# Patient Record
Sex: MALE | Race: BLACK OR AFRICAN AMERICAN | NOT HISPANIC OR LATINO | Employment: FULL TIME | URBAN - METROPOLITAN AREA
[De-identification: names, ages, dates, MRNs, and addresses within clinical notes are randomized per-mention and may not be internally consistent; named-entity substitution may affect disease eponyms.]

---

## 2024-03-17 ENCOUNTER — HOSPITAL ENCOUNTER (EMERGENCY)
Facility: CLINIC | Age: 39
Discharge: OTHER INSTITUTION | End: 2024-03-17
Attending: EMERGENCY MEDICINE | Admitting: EMERGENCY MEDICINE

## 2024-03-17 ENCOUNTER — HOSPITAL ENCOUNTER (EMERGENCY)
Facility: CLINIC | Age: 39
Discharge: LEFT BEFORE DISCHARGE | End: 2024-03-17
Admitting: EMERGENCY MEDICINE

## 2024-03-17 VITALS
HEART RATE: 62 BPM | WEIGHT: 230 LBS | DIASTOLIC BLOOD PRESSURE: 61 MMHG | RESPIRATION RATE: 20 BRPM | OXYGEN SATURATION: 95 % | SYSTOLIC BLOOD PRESSURE: 131 MMHG | BODY MASS INDEX: 34.86 KG/M2 | TEMPERATURE: 97.6 F | HEIGHT: 68 IN

## 2024-03-17 VITALS
SYSTOLIC BLOOD PRESSURE: 125 MMHG | OXYGEN SATURATION: 100 % | TEMPERATURE: 98.3 F | HEIGHT: 68 IN | DIASTOLIC BLOOD PRESSURE: 78 MMHG | HEART RATE: 55 BPM | RESPIRATION RATE: 16 BRPM | WEIGHT: 230 LBS | BODY MASS INDEX: 34.86 KG/M2

## 2024-03-17 DIAGNOSIS — H57.11 EYE PAIN, RIGHT: ICD-10-CM

## 2024-03-17 PROCEDURE — 99283 EMERGENCY DEPT VISIT LOW MDM: CPT | Mod: 25

## 2024-03-17 PROCEDURE — 99281 EMR DPT VST MAYX REQ PHY/QHP: CPT | Performed by: EMERGENCY MEDICINE

## 2024-03-17 PROCEDURE — 250N000013 HC RX MED GY IP 250 OP 250 PS 637: Performed by: PHYSICIAN ASSISTANT

## 2024-03-17 PROCEDURE — 96372 THER/PROPH/DIAG INJ SC/IM: CPT | Performed by: PHYSICIAN ASSISTANT

## 2024-03-17 PROCEDURE — 250N000009 HC RX 250: Performed by: PHYSICIAN ASSISTANT

## 2024-03-17 PROCEDURE — 250N000011 HC RX IP 250 OP 636: Performed by: PHYSICIAN ASSISTANT

## 2024-03-17 RX ORDER — TETRACAINE HYDROCHLORIDE 5 MG/ML
1-2 SOLUTION OPHTHALMIC ONCE
Status: COMPLETED | OUTPATIENT
Start: 2024-03-17 | End: 2024-03-17

## 2024-03-17 RX ORDER — OXYCODONE HYDROCHLORIDE 5 MG/1
5 TABLET ORAL ONCE
Status: COMPLETED | OUTPATIENT
Start: 2024-03-17 | End: 2024-03-17

## 2024-03-17 RX ORDER — KETOROLAC TROMETHAMINE 15 MG/ML
15 INJECTION, SOLUTION INTRAMUSCULAR; INTRAVENOUS ONCE
Status: COMPLETED | OUTPATIENT
Start: 2024-03-17 | End: 2024-03-17

## 2024-03-17 RX ADMIN — TETRACAINE HYDROCHLORIDE 2 DROP: 5 SOLUTION OPHTHALMIC at 10:20

## 2024-03-17 RX ADMIN — OXYCODONE 5 MG: 5 TABLET ORAL at 10:43

## 2024-03-17 RX ADMIN — KETOROLAC TROMETHAMINE 15 MG: 15 INJECTION, SOLUTION INTRAMUSCULAR; INTRAVENOUS at 10:44

## 2024-03-17 RX ADMIN — FLUORESCEIN SODIUM 1 STRIP: 1 STRIP OPHTHALMIC at 10:20

## 2024-03-17 ASSESSMENT — ACTIVITIES OF DAILY LIVING (ADL)
ADLS_ACUITY_SCORE: 35
ADLS_ACUITY_SCORE: 33
ADLS_ACUITY_SCORE: 35
ADLS_ACUITY_SCORE: 33

## 2024-03-17 ASSESSMENT — COLUMBIA-SUICIDE SEVERITY RATING SCALE - C-SSRS
1. IN THE PAST MONTH, HAVE YOU WISHED YOU WERE DEAD OR WISHED YOU COULD GO TO SLEEP AND NOT WAKE UP?: NO
6. HAVE YOU EVER DONE ANYTHING, STARTED TO DO ANYTHING, OR PREPARED TO DO ANYTHING TO END YOUR LIFE?: NO
2. HAVE YOU ACTUALLY HAD ANY THOUGHTS OF KILLING YOURSELF IN THE PAST MONTH?: NO

## 2024-03-17 NOTE — ED TRIAGE NOTES
Pt here with concern that he may have re injured his right eye during the night. Pt had a cornea scratch a year ago and was treated with a medicated contact lens for a while and was told he could re injure it. Pt had no obvious trauma but think he may have accidentally scratched it. Eye watery, lid swollen. Pain 10/10.

## 2024-03-17 NOTE — ED NOTES
Patient unwilling to wait to see provider.  Patient stated they have been in the hospital since 8am and would like to go home.  Patient states they will come back in the morning or if the pain increases.  Patient was unwilling to wait to speak with MD.

## 2024-03-17 NOTE — ED TRIAGE NOTES
Eye sensitivity on the R eye started yesterday. Was seen at [facility] and suggested to come to the ED for ophtho eval     Triage Assessment (Adult)       Row Name 03/17/24 1231          Triage Assessment    Airway WDL WDL        Respiratory WDL    Respiratory WDL WDL        Skin Circulation/Temperature WDL    Skin Circulation/Temperature WDL WDL        Cardiac WDL    Cardiac WDL WDL        Peripheral/Neurovascular WDL    Peripheral Neurovascular WDL WDL        Cognitive/Neuro/Behavioral WDL    Cognitive/Neuro/Behavioral WDL WDL

## 2024-03-17 NOTE — ED PROVIDER NOTES
I am seeing this patient along with  Lio Dash PA-C. I had a face to face encounter with this patient seen by the Advanced Practice Provider (DARLIN).  I have seen, examined, and discussed the patient with the DARLIN and agree with their assessment and plan of management. I personally saw the patient and performed a substantive portion of the visit including all aspects of the medical decision making.    HPI: The patient reports for right eye pain. Patient states he is concern about possible injury to his right eye. He had a cornea scratch a year ago and was treated with a medicated contact lens for awhile. Patient was told he could re-injure it. No obvious trauma but thinks he may have accidentally scratched it. Patients eye is watery and endorses swollen lid. He rates the pain a 10/10. No other complaints at this time.     Physical Exam: Right eye with conjunctival injection mildly and tearing.  Significant photophobia present.      LABS  Pertinent lab results reviewed in chart.  Labs Ordered and Resulted from Time of ED Arrival to Time of ED Departure - No data to display    EKG      RADIOLOGY  No orders to display       PROCEDURES       ED COURSE & MEDICAL DECISION MAKING      10:23 AM I met and introduce myself to the patient.   Pertinent Labs and Imagaing reviewed (see chart for details)    38 year old male here with atraumatic right eye pain, photophobia.  He has a history of corneal abrasion in the past as well as a corneal ulcer is what it sounds like.  There was no trauma this time, and his staining exam in the ED did not reveal an abrasion or any uptake.  However, he continues with pretty significant photophobia and I am worried about something like iritis.  He also has eye pressures that are in the 35-41 range bilaterally.  We do think he needs an ophthalmologic evaluation.  Unfortunately, we have no specialty services to consult here at [facility], and we cannot get any phone consultations with the  team from the , so the only option on the weekend currently is to do an ED to ED transfer to [facility] for him to be seen in person by ophthalmology there.  The ED physician at [facility] has accepted the patient for transfer and patient and family member are comfortable with this plan.    At the conclusion of the encounter I discussed  the results of all of the tests and the disposition.   The questions were answered.  The patient or family acknowledged understanding and was agreeable with the care plan.       FINAL IMPRESSION      1. Eye pain, right            CRITICAL CARE  0 Minutes    Adri Chery MD  03/17/24 0957  3/17/2024 10:07 AM      Adri Chery MD  03/17/24 1053

## 2024-03-17 NOTE — ED PROVIDER NOTES
EMERGENCY DEPARTMENT ENCOUNTER   NAME: Power Blanco ; AGE: 38 year old male ; YOB: 1985 ; MRN: [id number] ; PCP: No primary care provider on file.     Evaluation Date & Time: 3/17/2024  9:32 AM    ED Provider: Ekta Vaca PA-C    CHIEF COMPLAINT     Eye Pain      FINAL ASSESSMENT       ICD-10-CM    1. Eye pain, right  H57.11           ED COURSE, MEDICAL DECISION MAKING, PLAN     ED course   9:46 AM: Evaluated patient. Performed physical exam. Plan to use tetracaine and fluorescein and do a more detailed eye exam.   10:11 AM: Performed slit lamp exam and ocular pressures. Staffed with Dr. Chery.   10:46 AM: Spoke with Dr. Alexis from [facility]. Pt will ED to ED transfer now.   10:52 AM: Updated patient. Him and wife agreeable with plan. They will drive there now.     ______________________________________________________________________    Power Blanco is a 38 year old male with pertinent medical history of DM2 presenting for right eye pain, watering, and photophobia since yesterday. No known injury. Pt had a corneal abrasion one year ago to this eye that led to a corneal erosion and was treated with a medicated lens.     On exam patient is uncomfortable appearing. Very difficulty with opening the right eye. Eye is watering. Upper lid appears scantly swollen without erythema. Conjunctiva is slightly reddened, but not significantly so. No fluorescein uptake appreciated. No dendritic lesions. No ulcerations. Anterior chamber free of debris. No gabriel sign. PERRLA. Visual acuity unable to be done due to patients pain and eye watering.   Vitally normal.     Considered corneal abrasion, corneal ulcer, iritis, scleritis, acute angle closure glaucoma, eye foreign body, ocular shingles.    Tetracaine and fluorescein placed in right eye. Ocular pressures obtained which were elevated bilaterally.  Right eye with an average of 35 and left eye with an average of 41.  Slit lamp was then performed  showing no significant findings as discussed below. Orellana lamp also used with no areas of uptake appreciated.     Despite placing multiple drops of tetracaine, patient continues to have pretty severe pain in that right eye.  He continues having a harder time opening the eye and it continues to water profusely.  He also continues with severe photophobia.    Considering patient's exam, I did feel it was necessary for patient to be urgently evaluated by ophthalmology.  I did try calling his ophthalmologist through [facility], but they were not available on the weekend.  I also tried reaching out to [facility], but they were unable to accommodate given the weekend.  Because of this, called over to [place] ED to have him transferred there for urgent ophthalmology consult.    Patient continues to report only minimal relief from the tetracaine despite multiple drops. Patient was then given 5 mg of oral oxycodone and 15 mg of IM Toradol just prior to leave ED.     Wife will drive him to [place] ED now.      ______________________________________________________________________    *All pertinent lab & imaging studies independently reviewed. (See chart for details)   *Discussed the results of all the tests and plan with patient and family/guardians.   *All questions were answered.   *The patient and/or family/guardian acknowledged understanding and was agreeable with the care plan.      HISTORY OF PRESENT ILLNESS   Patient information was obtained from: Patient    Use of Intrepreter: N/A     Power NIETO Francis is a 38 year old male with a pertinent history of DM 2 who presents to the ED by means of walk-in for evaluation of right eye pain that started yesterday.    Pt had a corneal abrasion 1 year ago that caused him some issues for couple of months.  He ended up getting medicated lens placed by ophthalmology that seem to help.  Last time he was seen by ophthalmology was in November 2022.  He states he has not had  "any issues since then.    He cannot recall any type of new injury to the eye but thinks maybe something could have happened in his sleep.    States he has severe pain and severe light sensitivity.  States the eye is watering profusely.  States he is uncertain about vision changes because he cannot even open the eye and when he does it water so much that he cannot see anything.    He has not had any systemic signs or symptoms.    Per my chart review:   Patient was seen at [facility] on 5/29/2022 for a corneal abrasion when he was playing basketball and another player poked him in the right eye.  He was prescribed antibiotic eyedrops and Voltaren eyedrops.  Patient was then seen on 9/1/2022 [place] [facility] for recurrent right eye pain despite no new injury.  He was given erythromycin ointment.  He was given referral to ophthalmology.  Patient was then seen by ophthalmology on 11/2/2022 and diagnosed with a corneal erosion.  BCL was placed on the eye.  IOP's were elevated.  He was then seen the next day for follow-up and was to start Ocuflox, lubricating drops, and a lubricant ointment.       MEDICAL HISTORY     No past medical history on file.    No past surgical history on file.    No family history on file.         No current outpatient medications on file.        PHYSICAL EXAM     First Vitals:  Patient Vitals for the past 24 hrs:   BP Temp Pulse Resp SpO2 Height Weight   03/17/24 0930 -- -- -- -- -- 1.727 m (5' 8\") 104.3 kg (230 lb)   03/17/24 0929 131/61 97.6  F (36.4  C) 62 20 95 % -- --         PHYSICAL EXAM:   Eyes:  Gross exam -right upper eyelid is scantly swollen without erythema.  There is profuse watering to the right eye.  He is keeping the eye closed and is having a difficult time opening it.  Unable to get accurate visual acuity due to his inability to keep the eye open and the profuse watering.  Unable to do confrontation testing either because of the same.  The " right-sided conjunctiva is slightly reddened, but not significantly so.  No visualized foreign body.  No grossly visualized corneal defect.  PERRL.  No crusting or mattering of the lids or lashes.   Slit lamp exam - No foreign body.  No fluorescein uptake.  No corneal defect. Anterior chamber appears clear. No dendritic lesion.  No Bob sign.    Ocular pressures elevated with right eye at 35 and left eye at 41.     Constitutional: Patient is uncomfortable appearing.  Covers the right eye and holds the right side of his head.  Neuro: Awake and alert. No focal deficits.  Psych: Calm and cooperative.  Cardio: Regular rate. Adequate perfusion to extremities.   Pulmonary: Oxygenating well on RA. No labored breathing.   Skin: Natural color. Warm, dry, intact.      RESULTS     LAB:  All pertinent labs reviewed and interpreted  Labs Ordered and Resulted from Time of ED Arrival to Time of ED Departure - No data to display    RADIOLOGY:  No orders to display       ECG:    N/A      PROCEDURES       PROCEDURE: Slit lamp Exam and woods lamp exam   INDICATIONS: Right eye pain, watering   PROCEDURE PROVIDER: Ekta Vaca PA-C   SITE: right eye   CONSENT: The risks, benefits and alternatives for this procedure were explained to the patient and verbally accepted.     MEDICATION: fluorescein stain and tetracaine   EXAM FINDINGS: Right Eye: No fluorescein uptake.  No corneal defects.  No debris seen in the anterior chamber.  No foreign body.  No Bob sign.  No dendritic lesions.  Left Eye: N/A   COMPLICATIONS: Patient tolerated procedure well, without complication       PROCEDURE: Intraocular Pressure Measurement   DEVICE USED: Tonopen   INDICATIONS: Right eye pain   PROCEDURE PROVIDER: Ekta Vaca PA-C   SITE: Eyes   MEDICATION: 0.5% Tetracaine ophthalmic drops were applied to both eyes   NOTE: Administered 2 drops of above solution, rapid anesthesia achieved. Using standard technique, performed Tonopen exam, which showed  intraocular pressure(s) of;  35 mmHg in Right eye  41 mmHg in Left eye     COMPLICATIONS: Patient tolerated procedure well, without complication               History:  Supplemental history from: Documented in chart  External Record(s) reviewed: Outpatient Record: See HPI    Work Up:  Chart documentation includes differentials considered and any EKGs or imaging independently interpreted by provider, where specified.  In additional to work up documented, I considered the following work up: Documented in chart, if applicable.    External consultation:  Discussion of management with another provider: Other: ED MD from [place] [facility]    Complicating factors:  Care impacted by chronic illness: Diabetes  Care affected by social determinants of health: N/A    Disposition considerations:  ED to ED transfer to [place] for ophthalmology consult    FINAL IMPRESSION:    ICD-10-CM    1. Eye pain, right  H57.11             MEDICATIONS GIVEN IN THE EMERGENCY DEPARTMENT:  Medications   tetracaine (PONTOCAINE) 0.5 % ophthalmic solution 1-2 drop (2 drops Right Eye $Given by Other 3/17/24 1020)   fluorescein (FUL-SAIDA) ophthalmic strip 1 strip (1 strip Right Eye $Given by Other 3/17/24 1020)   oxyCODONE (ROXICODONE) tablet 5 mg (5 mg Oral $Given 3/17/24 1043)   ketorolac (TORADOL) injection 15 mg (15 mg Intramuscular $Given 3/17/24 1044)         NEW PRESCRIPTIONS STARTED AT TODAY'S ED VISIT:  New Prescriptions    No medications on file              Some or all of this documentation has been completed using dictation software and mild grammatical errors may be present. Please contact me with any concerns regarding this.       Ekta Vaca PA-C  Emergency Medicine   [facility] EMERGENCY ROOM       Ekta Vaca PA-C  03/17/24 0499

## 2024-03-20 ENCOUNTER — HOSPITAL ENCOUNTER (EMERGENCY)
Facility: CLINIC | Age: 39
Discharge: ANOTHER HOSPITAL | End: 2024-03-20
Attending: EMERGENCY MEDICINE | Admitting: EMERGENCY MEDICINE

## 2024-03-20 ENCOUNTER — OFFICE VISIT (OUTPATIENT)
Dept: OPHTHALMOLOGY | Facility: CLINIC | Age: 39
End: 2024-03-20
Attending: STUDENT IN AN ORGANIZED HEALTH CARE EDUCATION/TRAINING PROGRAM

## 2024-03-20 ENCOUNTER — HOSPITAL ENCOUNTER (EMERGENCY)
Facility: CLINIC | Age: 39
Discharge: HOME | End: 2024-03-20
Attending: FAMILY MEDICINE

## 2024-03-20 VITALS
SYSTOLIC BLOOD PRESSURE: 130 MMHG | OXYGEN SATURATION: 100 % | HEART RATE: 68 BPM | RESPIRATION RATE: 16 BRPM | DIASTOLIC BLOOD PRESSURE: 69 MMHG | TEMPERATURE: 97.8 F

## 2024-03-20 VITALS
OXYGEN SATURATION: 100 % | RESPIRATION RATE: 20 BRPM | TEMPERATURE: 98 F | HEIGHT: 68 IN | HEART RATE: 66 BPM | DIASTOLIC BLOOD PRESSURE: 67 MMHG | SYSTOLIC BLOOD PRESSURE: 141 MMHG | BODY MASS INDEX: 34.86 KG/M2 | WEIGHT: 230 LBS

## 2024-03-20 DIAGNOSIS — H57.11 ACUTE RIGHT EYE PAIN: Primary | ICD-10-CM

## 2024-03-20 DIAGNOSIS — H53.8 BLURRY VISION, RIGHT EYE: ICD-10-CM

## 2024-03-20 DIAGNOSIS — H53.149 PHOTOPHOBIA: ICD-10-CM

## 2024-03-20 DIAGNOSIS — H40.053 INCREASED INTRAOCULAR PRESSURE, BILATERAL: ICD-10-CM

## 2024-03-20 DIAGNOSIS — H40.039 ANATOMICAL NARROW ANGLE: ICD-10-CM

## 2024-03-20 DIAGNOSIS — H40.211 ACUTE ANGLE-CLOSURE GLAUCOMA OF RIGHT EYE: ICD-10-CM

## 2024-03-20 DIAGNOSIS — H40.211 ACUTE ANGLE-CLOSURE GLAUCOMA OF RIGHT EYE: Primary | ICD-10-CM

## 2024-03-20 PROCEDURE — 96372 THER/PROPH/DIAG INJ SC/IM: CPT

## 2024-03-20 PROCEDURE — 99284 EMERGENCY DEPT VISIT MOD MDM: CPT | Performed by: FAMILY MEDICINE

## 2024-03-20 PROCEDURE — 99283 EMERGENCY DEPT VISIT LOW MDM: CPT | Performed by: FAMILY MEDICINE

## 2024-03-20 PROCEDURE — 250N000013 HC RX MED GY IP 250 OP 250 PS 637

## 2024-03-20 PROCEDURE — 250N000009 HC RX 250

## 2024-03-20 PROCEDURE — 250N000011 HC RX IP 250 OP 636

## 2024-03-20 PROCEDURE — 66761 REVISION OF IRIS: CPT | Mod: 50 | Performed by: OPHTHALMOLOGY

## 2024-03-20 PROCEDURE — 99204 OFFICE O/P NEW MOD 45 MIN: CPT | Mod: 25 | Performed by: OPHTHALMOLOGY

## 2024-03-20 PROCEDURE — 66761 REVISION OF IRIS: CPT | Mod: LT

## 2024-03-20 PROCEDURE — 99285 EMERGENCY DEPT VISIT HI MDM: CPT

## 2024-03-20 PROCEDURE — 66761 REVISION OF IRIS: CPT | Mod: RT

## 2024-03-20 RX ORDER — TETRACAINE HYDROCHLORIDE 5 MG/ML
1 SOLUTION OPHTHALMIC ONCE
Status: COMPLETED | OUTPATIENT
Start: 2024-03-20 | End: 2024-03-20

## 2024-03-20 RX ORDER — KETOROLAC TROMETHAMINE 15 MG/ML
15 INJECTION, SOLUTION INTRAMUSCULAR; INTRAVENOUS ONCE
Status: COMPLETED | OUTPATIENT
Start: 2024-03-20 | End: 2024-03-20

## 2024-03-20 RX ORDER — LATANOPROST 50 UG/ML
1 SOLUTION/ DROPS OPHTHALMIC DAILY
Qty: 7.5 ML | Refills: 0 | Status: SHIPPED | OUTPATIENT
Start: 2024-03-20

## 2024-03-20 RX ORDER — ACETAZOLAMIDE 250 MG/1
500 TABLET ORAL ONCE
Status: DISCONTINUED | OUTPATIENT
Start: 2024-03-20 | End: 2024-03-20

## 2024-03-20 RX ORDER — OXYCODONE HYDROCHLORIDE 5 MG/1
5 TABLET ORAL ONCE
Status: COMPLETED | OUTPATIENT
Start: 2024-03-20 | End: 2024-03-20

## 2024-03-20 RX ORDER — BRIMONIDINE TARTRATE 2 MG/ML
1 SOLUTION/ DROPS OPHTHALMIC 3 TIMES DAILY
Qty: 5 ML | Refills: 1 | Status: SHIPPED | OUTPATIENT
Start: 2024-03-20 | End: 2024-04-09

## 2024-03-20 RX ORDER — ACETAZOLAMIDE 250 MG/1
500 TABLET ORAL ONCE
Status: COMPLETED | OUTPATIENT
Start: 2024-03-20 | End: 2024-03-20

## 2024-03-20 RX ORDER — DORZOLAMIDE HCL 20 MG/ML
1 SOLUTION/ DROPS OPHTHALMIC 3 TIMES DAILY
Qty: 10 ML | Refills: 1 | Status: SHIPPED | OUTPATIENT
Start: 2024-03-20 | End: 2024-03-20

## 2024-03-20 RX ORDER — ACETAZOLAMIDE 500 MG/1
500 CAPSULE, EXTENDED RELEASE ORAL 2 TIMES DAILY
Qty: 20 CAPSULE | Refills: 1 | Status: SHIPPED | OUTPATIENT
Start: 2024-03-20 | End: 2024-04-01

## 2024-03-20 RX ADMIN — FLUORESCEIN SODIUM 1 STRIP: 1 STRIP OPHTHALMIC at 10:12

## 2024-03-20 RX ADMIN — ACETAZOLAMIDE 500 MG: 250 TABLET ORAL at 14:03

## 2024-03-20 RX ADMIN — KETOROLAC TROMETHAMINE 15 MG: 15 INJECTION, SOLUTION INTRAMUSCULAR; INTRAVENOUS at 10:09

## 2024-03-20 RX ADMIN — OXYCODONE 5 MG: 5 TABLET ORAL at 10:11

## 2024-03-20 RX ADMIN — TETRACAINE HYDROCHLORIDE 1 DROP: 5 SOLUTION OPHTHALMIC at 10:12

## 2024-03-20 ASSESSMENT — TONOMETRY
IOP_METHOD: TONOPEN
OD_IOP_MMHG: 52
OS_IOP_MMHG: 28
IOP_METHOD: TONOPEN
IOP_METHOD: TONOPEN
OD_IOP_MMHG: 45
OD_IOP_MMHG: 47

## 2024-03-20 ASSESSMENT — SLIT LAMP EXAM - LIDS
COMMENTS: PROTECTIVE PTOSIS
COMMENTS: NORMAL

## 2024-03-20 ASSESSMENT — ACTIVITIES OF DAILY LIVING (ADL)
ADLS_ACUITY_SCORE: 33
ADLS_ACUITY_SCORE: 33
ADLS_ACUITY_SCORE: 35

## 2024-03-20 ASSESSMENT — COLUMBIA-SUICIDE SEVERITY RATING SCALE - C-SSRS
2. HAVE YOU ACTUALLY HAD ANY THOUGHTS OF KILLING YOURSELF IN THE PAST MONTH?: NO
1. IN THE PAST MONTH, HAVE YOU WISHED YOU WERE DEAD OR WISHED YOU COULD GO TO SLEEP AND NOT WAKE UP?: NO
6. HAVE YOU EVER DONE ANYTHING, STARTED TO DO ANYTHING, OR PREPARED TO DO ANYTHING TO END YOUR LIFE?: NO
2. HAVE YOU ACTUALLY HAD ANY THOUGHTS OF KILLING YOURSELF IN THE PAST MONTH?: NO
6. HAVE YOU EVER DONE ANYTHING, STARTED TO DO ANYTHING, OR PREPARED TO DO ANYTHING TO END YOUR LIFE?: NO
1. IN THE PAST MONTH, HAVE YOU WISHED YOU WERE DEAD OR WISHED YOU COULD GO TO SLEEP AND NOT WAKE UP?: NO

## 2024-03-20 ASSESSMENT — ENCOUNTER SYMPTOMS: EYE PAIN: 1

## 2024-03-20 ASSESSMENT — EXTERNAL EXAM - RIGHT EYE: OD_EXAM: NORMAL

## 2024-03-20 ASSESSMENT — EXTERNAL EXAM - LEFT EYE: OS_EXAM: NORMAL

## 2024-03-20 NOTE — ED TRIAGE NOTES
Took sidenafil 100mg and dapoxeoline 60 mg oral jelly on Friday and shortly afterwards bilateral eyes became painful and swollen. Photosensitive. Did take a second dose on Saturday and is wondering if this is some kind of reaction to medication. Denies injury but does state this feels similar to when he had a corneal abrasion.      Triage Assessment (Adult)       Row Name 03/20/24 0849          Triage Assessment    Airway WDL WDL        Skin Circulation/Temperature WDL    Skin Circulation/Temperature WDL WDL

## 2024-03-20 NOTE — PROGRESS NOTES
Power Blanco is a 38 year old male who presented to the ED 3/20/24 for right eye vision loss and pain.      Friday 3/15/24 he took sildenafil then shortly thereafter each eye developed blurry vision. He woke up with right eye pain that lasted the whole day but slowly improved. He then took another dose of sildenafil Saturday night and both eyes had blurry vision again. When he woke up only the right eye was blurry and painful. He presented to receive medical attention Sunday but the wait was too long at the ED so he left. The right eye vision loss (50% of his normal vision) and aching eye pain have persisted prompting presentation today. The entire visual field is blurry. Pain is dull/aching and lights or pressure on the eye make the pain worse.      Has had a right eye corneal abrasion in the past while playing basketball that hurt more than this.      Tried visine with no improvement.      No trauma or foreign bodies. Pain started seemingly out of nowhere.      No personal or family history of sickle cell or glaucoma.      Review of systems were otherwise negative except for that which has been stated above.        OCULAR/MEDICAL/SURGICAL HISTORIES:      Past Ocular History:  Last eye exam: 11/22  Prior eye surgery/laser: None  Contact lens wear: No  Glasses: None  Eyedrops: Visine prn      Family History:  No family history of sickle cell or glaucoma         Past Medical History        Past Medical History:   Diagnosis Date    Diabetes mellitus, type 2 (H)              Past Surgical History   No past surgical history on file.        EXAMINATION:      Base Eye Exam         Visual Acuity (Snellen - Linear)           Right Left     Near sc 20/400 PH NI 20/20                  Tonometry (Tonopen, 12:40 PM)           Right Left     Pressure Too high to measure 42                  Pupils           Dark Light Shape React APD     Right 5 5 Round Minimal None by reverse     Left 3 1 Round Brisk None                   Visual Fields           Left Right         Full     Restrictions Partial outer superior temporal, inferior temporal, superior nasal, inferior nasal deficiencies                    Extraocular Movement           Right Left       Full, Ortho Full, Ortho                  Dilation    Deferred given narrow angles left eye with history suggestive of  inducible angle closure                      Slit Lamp and Fundus Exam         External Exam           Right Left     External Normal Normal                  Slit Lamp Exam           Right Left     Lids/Lashes protective ptosis Normal     Conjunctiva/Sclera limbal flush, CAM Large patch of CAM IT     Cornea Microcystic edema greater centrally, inferior pigmented cell clear, compact, no KP     Anterior Chamber Shallow, 2+ mixed pigment and cell Formed, very shallow by VH, no cell     Iris Fixed, mid dilated, no posterior synechiae visualized Round and reactive     Lens No lens iris touch Clear     Anterior Vitreous Poor view                            Labs/Studies/Imaging Performed:  Bedside ultrasound. No choroidal effusions identified. Vitreous clear of debris, no retinal tears or detachments. No T sign.       ASSESSMENT/PLAN:      Power Blanco is a 38 year old male who presented 03/20/24 with      # Acute Angle Closure, Right Eye:   # Anatomic Narrow Angle, Left Eye:  38 year old male of unknown refractive state who presented with right eye pain and vision loss found to be in acute angle closure right eye with narrow angles left eye. There have been reports of sildenafil associated angle closure though the mechanism has not been confirmed. His angle closure may have been precipitated by pupillary dilation via sympathetic drive and dark lighting surrounding the use of sildenafil. Sulfa induced choroidal effusion as a posterior pushing mechanism was considered but ruled out with bed side ultra sound which was confirmed prior to administration of diamox. Also considered  uveitic glaucoma given 2+ mixed cell right eye however this was likely a response to 4 days of angle closure and his left eye, which was symptomatic over the weekend, did not have cell. No evidence of an anterior pulling mechanism on exam, though all angle structures were obscured by iris right eye. His pressure responded to topical IOP lowering medications and one dose of 500 mg diamox, from unreadably high to the 40s and 50s right eye and from 42 to the 20s left eye.      He was discharged from the ED for immediate LPI each eye in clinic. See procedure note from that visit. We discussed the r/b/a to LPI each eye and the patient agreed to proceed. We also discussed that the degree of permanent vision loss secondary to IOP induced optic nerve damage was unknown at this time and would need to be assessed after successful LPI.         Plan:  - Diamox sequels 500 BID (no personal or family history of sickle cell and no renal disease)  - Latanoprost, timolol, and brimonidine each eye TID   - For post LPI hyphema, bedrest as much as possible, sleep with head of bed elevated, no heavy lifting or straining.   - Follow up Friday 3/22/24 for IOP check, hyphema check and likely repeat LPI each eye if patency cannot be confirmed after hyphema resolution.         Patient was discussed with Dr. Gilliland, PGY-4 and seen with Dr. Kebede.     Sridhar Childers MD  Resident Physician, PGY-2  Department of Ophthalmology     The patient transferred to clinic to receive LPI each eye from the ED for acute angle closure right eye and narrow angles left eye. Please see consult note from today for details. R/B/A to LPI each eye were discussed with the patient who agreed to proceed with the procedure in both eyes.       Brief Operative Note:    Date of Service: March 20, 2024    Attending: Francisco Kebede M.D.   Assistant: Sridhar Childers M.D.    Preoperative diagnosis: Narrow angles, both eyes  Postoperative diagnosis:  same  Procedure: laser peripheral iridotomy (LPI) both eyes  Anesthesia: Topical lidocaine  Complications: Hyphema each eye     After informed consent was obtained the patient was brought to the YAG laser room and given one drop of proparacaine in the operative eyes. One drop of Iopidine was given. Attention was directed to the right eye. An Riley iridotomy lens was used to view the peripheral iris at 11 o' clock. 3.2 - 3.6 mJ 42 shots, 1 pulse per burst were applied to an iris crypt until hemorrhage was encountered. Pressure was applied until the hemorrhage ceased. Attention was directed to the left eye. An Riley iridotomy lens was used to view the peripheral iris at 1 o' clock. 3.8 mJ for 49 shots, 1 pulse per burst were applied to an iris crypt until hemorrhage was encountered. Pressure was applied until the hemorrhage ceased. Both eyes were inspected and it was deemed that no further laser could be applied safely through the hemorrhages and the procedure was stopped. The patient tolerated the procedure well. One drop of Iopidine was given. The Riley lens was removed.    The patient was informed that hemorrhage precluded confirmation of successful LPI in both eyes and that re-evaluation and possible repeat procedure would be required at follow up once hemorrhage cleared.    IOP check after the procedure was 47 and 23 down from unreadable and 42 on presentation.     Sridhar Childers MD  Resident Physician, PGY-2  Department of Ophthalmology    Attending Physician Attestation:  Complete documentation of historical and exam elements from today's encounter can be found in the full encounter summary report (not reduplicated in this progress note).  I personally obtained the chief complaint(s) and history of present illness.  I confirmed and edited as necessary the review of systems, past medical/surgical history, family history, social history, and examination findings as documented by others; and I examined  the patient myself.  I personally reviewed the relevant tests, images, and reports as documented above.  I formulated and edited as necessary the assessment and plan and discussed the findings and management plan with the patient and family.  - Francisco Kebede MD

## 2024-03-20 NOTE — ED TRIAGE NOTES
Triage Assessment (Adult)       Row Name 03/20/24 1133          Triage Assessment    Airway WDL WDL        Respiratory WDL    Respiratory WDL WDL        Skin Circulation/Temperature WDL    Skin Circulation/Temperature WDL WDL        Cardiac WDL    Cardiac WDL WDL        Peripheral/Neurovascular WDL    Peripheral Neurovascular WDL WDL        Cognitive/Neuro/Behavioral WDL    Cognitive/Neuro/Behavioral WDL WDL

## 2024-03-20 NOTE — ED NOTES
Bed: ED19  Expected date:   Expected time:   Means of arrival:   Comments:  Hold VIV JON once clean

## 2024-03-20 NOTE — CONSULTS
OPHTHALMOLOGY CONSULT NOTE  03/20/24    Patient: Power Blanco  Consulted by: ED  Reason for Consult: right eye vision loss and pain    HISTORY OF PRESENTING ILLNESS:     Power Blanco is a 38 year old male who presented to the ED 3/20/24 for right eye vision loss and pain.     Friday 3/15/24 he took sildenafil then shortly thereafter each eye developed blurry vision. He woke up with right eye pain that lasted the whole day but slowly improved. He then took another dose of sildenafil Saturday night and both eyes had blurry vision again. When he woke up only the right eye was blurry and painful. He presented to receive medical attention Sunday but the wait was too long at the ED so he left. The right eye vision loss (50% of his normal vision) and aching eye pain have persisted prompting presentation today. The entire visual field is blurry. Pain is dull/aching and lights or pressure on the eye make the pain worse.     Has had a right eye corneal abrasion in the past while playing basketball that hurt more than this.     Tried visine with no improvement.     No trauma or foreign bodies. Pain started seemingly out of nowhere.     No personal or family history of sickle cell or glaucoma.     Review of systems were otherwise negative except for that which has been stated above.      OCULAR/MEDICAL/SURGICAL HISTORIES:     Past Ocular History:  Last eye exam: 11/22  Prior eye surgery/laser: None  Contact lens wear: No  Glasses: None  Eyedrops: Visine prn     Family History:  No family history of sickle cell or glaucoma       Past Medical History:   Diagnosis Date    Diabetes mellitus, type 2 (H)        No past surgical history on file.    EXAMINATION:     Base Eye Exam       Visual Acuity (Snellen - Linear)         Right Left    Near sc 20/400 PH NI 20/20              Tonometry (Tonopen, 12:40 PM)         Right Left    Pressure Too high to measure 42              Pupils         Dark Light Shape React APD    Right 5 5  Round Minimal None by reverse    Left 3 1 Round Brisk None              Visual Fields         Left Right      Full    Restrictions Partial outer superior temporal, inferior temporal, superior nasal, inferior nasal deficiencies               Extraocular Movement         Right Left     Full, Ortho Full, Ortho              Dilation    Deferred given narrow angles left eye with history suggestive of  inducible angle closure                 Slit Lamp and Fundus Exam       External Exam         Right Left    External Normal Normal              Slit Lamp Exam         Right Left    Lids/Lashes protective ptosis Normal    Conjunctiva/Sclera limbal flush, CAM Large patch of CAM IT    Cornea Microcystic edema greater centrally, inferior pigmented cell clear, compact, no KP    Anterior Chamber Shallow, 2+ mixed pigment and cell Formed, very shallow by VH, no cell    Iris Fixed, mid dilated, no posterior synechiae visualized Round and reactive    Lens No lens iris touch Clear    Anterior Vitreous Poor view                     Labs/Studies/Imaging Performed:  Bedside ultrasound. No choroidal effusions identified. Vitreous clear of debris, no retinal tears or detachments. No T sign.      ASSESSMENT/PLAN:     Power Blanco is a 38 year old male who presented 03/20/24 with     # Acute Angle Closure, Right Eye:   # Anatomic Narrow Angle, Left Eye:  38 year old male of unknown refractive state who presented with right eye pain and vision loss found to be in acute angle closure right eye with narrow angles left eye. There have been reports of sildenafil associated angle closure though the mechanism has not been confirmed. His angle closure may have been precipitated by pupillary dilation via sympathetic drive and dark lighting surrounding the use of sildenafil. Sulfa induced choroidal effusion as a posterior pushing mechanism was considered but ruled out with bed side ultra sound which was confirmed prior to administration of diamox.  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 No evidence of an anterior pulling mechanism on exam, though all angle structures were obscured by iris right eye. His pressure responded to topical IOP lowering medications and one dose of 500 mg diamox, from unreadably high to the 40s and 50s right eye and from 42 to the 20s left eye.     He was discharged from the ED for immediate LPI each eye in clinic. See procedure note from that visit. We discussed the r/b/a to LPI each eye and the patient agreed to proceed. We also discussed that the degree of permanent vision loss secondary to IOP induced optic nerve damage was unknown at this time and would need to be assessed after successful LPI.       Plan:  - Diamox sequels 500 BID (no personal or family history of sickle cell and no renal disease)  - Latanoprost, timolol, and brimonidine each eye TID   - For post LPI hyphema, bedrest as much as possible, sleep with head of bed elevated, no heavy lifting or straining.   - Follow up Friday 3/22/24 for IOP check, hyphema check and likely repeat LPI each eye if patency cannot be confirmed after hyphema resolution.       Patient was discussed with Dr. Gilliland, PGY-4 and seen with Dr. Kebede.    Sridhar Childers MD  Resident Physician, PGY-2  Department of Ophthalmology

## 2024-03-20 NOTE — PATIENT INSTRUCTIONS
Take diamox pill 2 times per day     Take Brimonidine, Timolol, and Latanoprost eye drops 3 times per day in both eyes     Take it easy as best you can and lay with your head elevated 45 degrees to allow the blood to settle.

## 2024-03-20 NOTE — ED NOTES
"Refused visual acuity stating the \"ED doctor just did it\".  Report called to [facility] and patient left via POV  "
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
## 2024-03-20 NOTE — ED PROVIDER NOTES
EMERGENCY DEPARTMENT ENCOUNTER      NAME: Power Blanco  AGE: 38 year old male  YOB: 1985  MRN: [id number]  EVALUATION DATE & TIME: No admission date for patient encounter.    PCP: No Ref-Primary, Physician    ED PROVIDER: Miranda Aly PA-C      Chief Complaint   Patient presents with    Eye Pain         FINAL IMPRESSION:  1. Acute right eye pain    2. Blurry vision, right eye    3. Photophobia    4. Increased intraocular pressure, bilateral          ED COURSE & MEDICAL DECISION MAKIN:57 AM Met with patient for initial interview. Plan for care discussed.  9:23 AM Staffed patient with Dr. Bennett.  9:29 AM Initiated transfer to transfer to [facility] for ophthalmology consult.  9:35 AM Spoke with ED MD at [facility] who requested we call back in 10 minutes.  9:56 AM Spoke with ED MD at [facility], Dr. Davis, who kindly accepts the patient for transfer.  9:57 AM Reevaluated and updated patient. I discussed the plan for transfer with the patient, and patient is agreeable. EMTALA form signed. Patient to transfer to [facility] ED for ophthalmology consultation via private vehicle. Patient to be discharged by RN.    38 year old male with a history of DM II and erectile dysfunction presents to the Emergency Department for evaluation of non-traumatic painful, red, swollen right eye with photophobia. Per chart review, patient was evaluated on 3/17/24 with painful, watering, photophobic eye 24 hrs after taking Sildenafil 100 mg and Dapoxetine 60 mg. Pressures were 35-41 at this time and patient ultimately transferred to the [facility] for ophthalmology consultation; however, patient left prior to being seen secondary to long wait time. Upon exam, patient is afebrile, hypertensive, and appears uncomfortable. Right eye with periobrital edema, photophobia and tearing. No obvious periorbital erythema, warmth, purulence, or fluctuance. No hyphema or hypopyon. Conjunctival injection and mid-dilated pupil with APD.  Visual acuity significantly decreased right eye compared to left eye. Peripheral vision preserved to finger wiggly, not counting. IOP 57-67 range right eye with one reading of 99, 51-57 range left eye. EOM full without pain. Cornea appears clear without obvious ulcerations. Differential diagnosis includes but not limited to acute angle closure glaucoma, iritis, uveitis, drug side effect. Low suspicion for orbital cellulitis given full EOM without pain. Wood's lamp without evidence of fluorescein uptake.     Patient was treated with Toradol and oxycodone upon arrival for pain relief. Symptoms and workup most concerning for acute angle closure glaucoma vs iritis vs uveitis.  Patient was made aware of the above findings. Given need for emergent ophthalmology consultation, plan to transfer patient to [facility] for ophthalmology consultation. I discussed the plan for transfer with the patient, and patient is agreeable. Spoke with ED MD at [facility], Dr. Davis, who kindly accepts the patient for transfer. EMTALA form signed. Patient to transfer to [facility] ED for ophthalmology consultation via private vehicle.    Medical Decision Making  Obtained supplemental history:Supplemental history obtained?: Documented in chart and Family Member/Significant Other  Reviewed external records: External records reviewed?: Documented in chart  Care impacted by chronic illness:Diabetes  Care significantly affected by social determinants of health:Medication Noncompliance  Did you consider but not order tests?: Work up considered but not performed and documented in chart, if applicable  Did you interpret images independently?: Independent interpretation of ECG and images noted in documentation, when applicable.  Consultation discussion with other provider:Did you involve another provider (consultant, MH, pharmacy, etc.)?: I discussed the care with another health care provider, see documentation for details.  Transfer to [facility] for ophthalmology  consultation     MEDICATIONS GIVEN IN THE EMERGENCY:  Medications   tetracaine (PONTOCAINE) 0.5 % ophthalmic solution 1 drop (1 drop Both Eyes $Given 3/20/24 1012)   fluorescein (FUL-SAIDA) ophthalmic strip 1 strip (1 strip Both Eyes $Given by Other 3/20/24 1012)   ketorolac (TORADOL) injection 15 mg (15 mg Intramuscular $Given 3/20/24 1009)   oxyCODONE (ROXICODONE) tablet 5 mg (5 mg Oral $Given 3/20/24 1011)       NEW PRESCRIPTIONS STARTED AT TODAY'S ER VISIT  There are no discharge medications for this patient.         =================================================================    HPI    Patient information was obtained from: patient    Use of : N/A       Power GERSON Blanco is a 38 year old male with a pertinent history of DM II and erectile dysfunction who presents to this ED for evaluation of right photosensitivity, pain, and blurry vision.  Per chart review, patient was evaluated here on 3/17/2024 with right eye pain, photosensitivity, and tearing 24 hrs after taking Sildenafil 100 mg and Dapoxetine 60 mg.  Pressures were elevated in the 35-41 range bilaterally.  He was ultimately transferred to the [facility] to be seen by ophthalmology.  However, patient states that it was a 3+ hour wait and he ended up leaving without being seen.  He states he has not seen an eye doctor since.  He states symptoms have persisted since this time.  He notes that the right eye vision is incredibly very compared to the left.  He reports peripheral vision may be slightly decreased as well.  He states he does not wear contacts or glasses.  He denies any associated headache, temporal pain, neck stiffness or rigidity.  He denies any fevers or chills at home.  He denies any history of eye diseases in the past.  He states he has seen optometrist in the past, but does not wear contacts or glasses.  He denies any family history of glaucoma, age-related macular degeneration, or any other eye diseases or disorders.    REVIEW OF  "SYSTEMS   Review of Systems see HPI    PAST MEDICAL HISTORY:  Past Medical History:   Diagnosis Date    Diabetes mellitus, type 2 (H)        PAST SURGICAL HISTORY:  No past surgical history on file.        CURRENT MEDICATIONS:    No current outpatient medications on file.      ALLERGIES:  No Known Allergies    FAMILY HISTORY:  No family history on file.    SOCIAL HISTORY:   Social History     Socioeconomic History    Marital status:    Tobacco Use    Smoking status: Every Day     Types: Cigarettes, Cigars     Passive exposure: Never    Smokeless tobacco: Never   Substance and Sexual Activity    Alcohol use: Not Currently    Drug use: Never    Sexual activity: Not Currently       VITALS:  BP (!) 141/67   Pulse 66   Temp 98  F (36.7  C) (Oral)   Resp 20   Ht 1.727 m (5' 8\")   Wt 104.3 kg (230 lb)   SpO2 100%   BMI 34.97 kg/m      PHYSICAL EXAM    Constitutional:  Alert, in no acute distress. Cooperative.  EYES: Right eye: Photophobia. Periorbital edema and tearing, no obvious periorbital erythema, warmth, purulence, or fluctuance. Conjunctival injection and mid-dilated pupil with APD. No hyphema or hypopyon. Visual acuity significantly decreased right eye compared to left eye. Peripheral vision preserved to finger wiggly, not counting. IOP 57-67 range right eye with one reading of 99. EOM full without pain.   Left eye: Photophobia. Mild conjunctival infection. 51-57 range left eye. PERRL. EOM full without pain. No periorbital erythema, warmth, purulence, edema, fluctuance.   HENT:  Atraumatic, normocephalic.  Respiratory:  Respirations even, unlabored, in no acute respiratory distress.  Cardiovascular:  Regular rate, good peripheral perfusion.  GI: Soft, flat, non-distended.  Musculoskeletal:  No edema. No cyanosis. Range of motion major extremities intact.    Integument: Warm, Dry.   Neurologic:  Alert & oriented. No focal deficits noted.  Psych: Normal mood and affect.      LAB:  All pertinent labs " reviewed and interpreted.       RADIOLOGY:  Reviewed all pertinent imaging. Please see official radiology report.  No orders to display     PROCEDURES:     PROCEDURE: Woods lamp Exam   INDICATIONS: Right eye pain   PROCEDURE PROVIDER: Miranda Aly PA-C   SITE: both eyes   CONSENT:  The risks, benefits and alternatives for this procedure were explained to the patient and verbally accepted.     MEDICATION: fluorescein stain and tetracaine   EXAM FINDINGS: Right Eye: No fluorescein uptake. No obvious corneal or conjunctival abrasions, ulcerations, or laceration.  Left Eye: No fluorescein uptake. No obvious corneal or conjunctival abrasions, ulcerations, or laceration.   COMPLICATIONS: Patient tolerated procedure well, without complication     Miranda Aly PA-C  [facility] EMERGENCY ROOM  [address] [address] [phone]      Miranda Aly PA-C  03/20/24 1413

## 2024-03-20 NOTE — ED PROVIDER NOTES
"Emergency Department Midlevel Supervisory Note     I had a face to face encounter with this patient seen by the Advanced Practice Provider (DARLIN). I personally made/approved the management plan and take responsibility for the patient management. I personally saw patient and performed a substantive portion of the visit including all aspects of the medical decision making.     ED Course:  9:23 AM Miranda Aly PA-C staffed patient with me. I agree with their assessment and plan of management, and I will see the patient.  9:27 AM I met with the patient to introduce myself, gather additional history, perform my initial exam, and discuss the plan.     Brief HPI:     Power Blanco is a 38 year old male who presents for evaluation of an eye problem.    The patient took sildenafil 100 mg and dapoxeoline 60 mg oral jelly on Friday. Shortly after taking the medications his eyes became painful and swollen with sensitivity to light. He took a second dose of the medications on Saturday and continued to have ongoing symptoms. He is concerned the symptoms may be due to a medication reaction. Now the pain, redness, swelling, and light sensitivity has localized to mainly the right eye.    I, Teo Ramos, am serving as a scribe to document services personally performed by Dr. Sheyla Bennett MD based on my observations and the provider's statements to me.   I, Sheyla Bennett MD, attest that Teo Ramos was acting in a scribe capacity, has observed my performance of the services and has documented them in accordance with my direction.    Brief Physical Exam: BP (!) 141/67   Pulse 66   Temp 98  F (36.7  C) (Oral)   Resp 20   Ht 1.727 m (5' 8\")   Wt 104.3 kg (230 lb)   SpO2 100%   BMI 34.97 kg/m    Constitutional:  Alert, in no acute distress  EYES: Conjunctivae clear  HENT:  Atraumatic  Respiratory:  Respirations even, unlabored, in no acute respiratory distress  Cardiovascular:  Regular rate and rhythm, good " peripheral perfusion  GI: Soft, non-distended, non-tender  Musculoskeletal:  Moves all 4 extremities equally, grossly symmetrical strength  Integument: Warm & dry. No appreciable rash, erythema.  Neurologic:  Alert & oriented, speech clear and fluent, no focal deficits noted  Psych: Normal mood and affect       MDM:  Right eye pain.  Patient had initially been seen in our emergency department 2 days ago and after being found to have high pressure in the eyes it was recommended that he be seen at the [facility] to be seen by ophthalmology.  He states he ultimately left as the wait was too long, he returns today as he has ongoing eye pain.  Today, his visual acuity is decreased in his right eye, his intraocular pressure is elevated in both eyes, he continues to have eye pain.  Concern for acute ophthalmology emergencies such as acute angle glaucoma, will transfer the patient to the [facility] for ophthalmology consultation.       1. Blurry vision, right eye    2. Photophobia        Procedures:  I was present for the key portions of procedures documented in DARLIN/midlevel note, see midlevel note for further details.    Sheyla Bennett MD  [facility] EMERGENCY ROOM  [address] [address]  [phone]     Sheyla Bennett MD  03/20/24 7509

## 2024-03-20 NOTE — ED NOTES
Patient provided discharge paperwork and instructions. To see OPH in clinic this afternoon, walked to exit with OPH.

## 2024-03-20 NOTE — ED NOTES
Bed: MIREYA-DANAY  Expected date:   Expected time:   Means of arrival:   Comments:  [facility]

## 2024-03-20 NOTE — DISCHARGE INSTRUCTIONS
Go immediately to [facility] for reevaluation and ophthalmology consultation.     [facility]  Address: [address], [place] [address]  Phone: [phone]

## 2024-03-22 ENCOUNTER — OFFICE VISIT (OUTPATIENT)
Dept: OPHTHALMOLOGY | Facility: CLINIC | Age: 39
End: 2024-03-22

## 2024-03-22 ENCOUNTER — HOSPITAL ENCOUNTER (EMERGENCY)
Facility: CLINIC | Age: 39
Discharge: HOME | End: 2024-03-22
Attending: EMERGENCY MEDICINE | Admitting: EMERGENCY MEDICINE

## 2024-03-22 VITALS
DIASTOLIC BLOOD PRESSURE: 77 MMHG | TEMPERATURE: 98 F | HEART RATE: 55 BPM | RESPIRATION RATE: 16 BRPM | SYSTOLIC BLOOD PRESSURE: 147 MMHG | OXYGEN SATURATION: 100 %

## 2024-03-22 DIAGNOSIS — H40.039 ANATOMICAL NARROW ANGLE: ICD-10-CM

## 2024-03-22 DIAGNOSIS — H40.9 GLAUCOMA OF RIGHT EYE, UNSPECIFIED GLAUCOMA TYPE: ICD-10-CM

## 2024-03-22 DIAGNOSIS — S05.01XA ABRASION OF RIGHT CORNEA, INITIAL ENCOUNTER: ICD-10-CM

## 2024-03-22 DIAGNOSIS — H40.211 ACUTE ANGLE-CLOSURE GLAUCOMA OF RIGHT EYE: Primary | ICD-10-CM

## 2024-03-22 LAB
ANION GAP SERPL CALCULATED.3IONS-SCNC: 10 MMOL/L (ref 7–15)
BASOPHILS # BLD AUTO: 0 10E3/UL (ref 0–0.2)
BASOPHILS NFR BLD AUTO: 1 %
BUN SERPL-MCNC: 14.8 MG/DL (ref 6–20)
CALCIUM SERPL-MCNC: 9.2 MG/DL (ref 8.6–10)
CHLORIDE SERPL-SCNC: 108 MMOL/L (ref 98–107)
CREAT SERPL-MCNC: 0.85 MG/DL (ref 0.67–1.17)
DEPRECATED HCO3 PLAS-SCNC: 19 MMOL/L (ref 22–29)
EGFRCR SERPLBLD CKD-EPI 2021: >90 ML/MIN/1.73M2
EOSINOPHIL # BLD AUTO: 0.1 10E3/UL (ref 0–0.7)
EOSINOPHIL NFR BLD AUTO: 1 %
ERYTHROCYTE [DISTWIDTH] IN BLOOD BY AUTOMATED COUNT: 12.6 % (ref 10–15)
GLUCOSE SERPL-MCNC: 115 MG/DL (ref 70–99)
HCT VFR BLD AUTO: 49 % (ref 40–53)
HGB BLD-MCNC: 16.8 G/DL (ref 13.3–17.7)
IMM GRANULOCYTES # BLD: 0 10E3/UL
IMM GRANULOCYTES NFR BLD: 0 %
LYMPHOCYTES # BLD AUTO: 0.8 10E3/UL (ref 0.8–5.3)
LYMPHOCYTES NFR BLD AUTO: 12 %
MCH RBC QN AUTO: 31.8 PG (ref 26.5–33)
MCHC RBC AUTO-ENTMCNC: 34.3 G/DL (ref 31.5–36.5)
MCV RBC AUTO: 93 FL (ref 78–100)
MONOCYTES # BLD AUTO: 0.3 10E3/UL (ref 0–1.3)
MONOCYTES NFR BLD AUTO: 5 %
NEUTROPHILS # BLD AUTO: 5.4 10E3/UL (ref 1.6–8.3)
NEUTROPHILS NFR BLD AUTO: 81 %
NRBC # BLD AUTO: 0 10E3/UL
NRBC BLD AUTO-RTO: 0 /100
PLATELET # BLD AUTO: 164 10E3/UL (ref 150–450)
POTASSIUM SERPL-SCNC: 4.7 MMOL/L (ref 3.4–5.3)
RBC # BLD AUTO: 5.29 10E6/UL (ref 4.4–5.9)
SODIUM SERPL-SCNC: 137 MMOL/L (ref 135–145)
WBC # BLD AUTO: 6.7 10E3/UL (ref 4–11)

## 2024-03-22 PROCEDURE — 85025 COMPLETE CBC W/AUTO DIFF WBC: CPT | Performed by: EMERGENCY MEDICINE

## 2024-03-22 PROCEDURE — 250N000011 HC RX IP 250 OP 636: Performed by: EMERGENCY MEDICINE

## 2024-03-22 PROCEDURE — 96365 THER/PROPH/DIAG IV INF INIT: CPT | Performed by: EMERGENCY MEDICINE

## 2024-03-22 PROCEDURE — 36415 COLL VENOUS BLD VENIPUNCTURE: CPT | Performed by: EMERGENCY MEDICINE

## 2024-03-22 PROCEDURE — 250N000013 HC RX MED GY IP 250 OP 250 PS 637: Performed by: STUDENT IN AN ORGANIZED HEALTH CARE EDUCATION/TRAINING PROGRAM

## 2024-03-22 PROCEDURE — 92133 CPTRZD OPH DX IMG PST SGM ON: CPT

## 2024-03-22 PROCEDURE — 80048 BASIC METABOLIC PNL TOTAL CA: CPT | Performed by: EMERGENCY MEDICINE

## 2024-03-22 PROCEDURE — 250N000009 HC RX 250

## 2024-03-22 PROCEDURE — 99284 EMERGENCY DEPT VISIT MOD MDM: CPT | Mod: 25 | Performed by: EMERGENCY MEDICINE

## 2024-03-22 PROCEDURE — 76513 OPH US DX ANT SGM US UNI/BI: CPT

## 2024-03-22 PROCEDURE — 99285 EMERGENCY DEPT VISIT HI MDM: CPT | Performed by: EMERGENCY MEDICINE

## 2024-03-22 PROCEDURE — 96375 TX/PRO/DX INJ NEW DRUG ADDON: CPT | Performed by: EMERGENCY MEDICINE

## 2024-03-22 PROCEDURE — 99214 OFFICE O/P EST MOD 30 MIN: CPT | Mod: GC | Performed by: OPHTHALMOLOGY

## 2024-03-22 PROCEDURE — 99213 OFFICE O/P EST LOW 20 MIN: CPT

## 2024-03-22 PROCEDURE — 96366 THER/PROPH/DIAG IV INF ADDON: CPT | Performed by: EMERGENCY MEDICINE

## 2024-03-22 PROCEDURE — 96376 TX/PRO/DX INJ SAME DRUG ADON: CPT | Performed by: EMERGENCY MEDICINE

## 2024-03-22 PROCEDURE — 250N000011 HC RX IP 250 OP 636: Performed by: INTERNAL MEDICINE

## 2024-03-22 PROCEDURE — 250N000013 HC RX MED GY IP 250 OP 250 PS 637: Performed by: INTERNAL MEDICINE

## 2024-03-22 PROCEDURE — 92133 CPTRZD OPH DX IMG PST SGM ON: CPT | Mod: 26 | Performed by: OPHTHALMOLOGY

## 2024-03-22 RX ORDER — MANNITOL 20 G/100ML
50 INJECTION, SOLUTION INTRAVENOUS ONCE
Status: COMPLETED | OUTPATIENT
Start: 2024-03-22 | End: 2024-03-22

## 2024-03-22 RX ORDER — OFLOXACIN 3 MG/ML
1-2 SOLUTION/ DROPS OPHTHALMIC 4 TIMES DAILY
Qty: 10 ML | Refills: 2 | Status: SHIPPED | OUTPATIENT
Start: 2024-03-22 | End: 2024-04-16

## 2024-03-22 RX ORDER — HYDROMORPHONE HYDROCHLORIDE 1 MG/ML
0.5 INJECTION, SOLUTION INTRAMUSCULAR; INTRAVENOUS; SUBCUTANEOUS EVERY 30 MIN PRN
Status: COMPLETED | OUTPATIENT
Start: 2024-03-22 | End: 2024-03-22

## 2024-03-22 RX ORDER — PREDNISOLONE ACETATE 10 MG/ML
1-2 SUSPENSION/ DROPS OPHTHALMIC 4 TIMES DAILY
Qty: 10 ML | Refills: 1 | Status: SHIPPED | OUTPATIENT
Start: 2024-03-22 | End: 2024-04-09

## 2024-03-22 RX ORDER — OXYCODONE HYDROCHLORIDE 5 MG/1
5 TABLET ORAL ONCE
Status: COMPLETED | OUTPATIENT
Start: 2024-03-22 | End: 2024-03-22

## 2024-03-22 RX ORDER — MOXIFLOXACIN 5 MG/ML
1 SOLUTION/ DROPS OPHTHALMIC 4 TIMES DAILY
Status: DISCONTINUED | OUTPATIENT
Start: 2024-03-22 | End: 2024-03-23 | Stop reason: HOSPADM

## 2024-03-22 RX ORDER — OXYCODONE HYDROCHLORIDE 5 MG/1
5 TABLET ORAL EVERY 6 HOURS PRN
Qty: 3 TABLET | Refills: 0 | Status: SHIPPED | OUTPATIENT
Start: 2024-03-22

## 2024-03-22 RX ORDER — ONDANSETRON 2 MG/ML
4 INJECTION INTRAMUSCULAR; INTRAVENOUS EVERY 30 MIN PRN
Status: DISCONTINUED | OUTPATIENT
Start: 2024-03-22 | End: 2024-03-23 | Stop reason: HOSPADM

## 2024-03-22 RX ADMIN — OXYCODONE HYDROCHLORIDE 5 MG: 5 TABLET ORAL at 22:23

## 2024-03-22 RX ADMIN — MANNITOL 50 G: 20 INJECTION, SOLUTION INTRAVENOUS at 18:58

## 2024-03-22 RX ADMIN — HYDROMORPHONE HYDROCHLORIDE 0.5 MG: 1 INJECTION, SOLUTION INTRAMUSCULAR; INTRAVENOUS; SUBCUTANEOUS at 20:48

## 2024-03-22 RX ADMIN — MOXIFLOXACIN 1 DROP: 5 SOLUTION/ DROPS OPHTHALMIC at 22:13

## 2024-03-22 RX ADMIN — ONDANSETRON 4 MG: 2 INJECTION INTRAMUSCULAR; INTRAVENOUS at 15:17

## 2024-03-22 RX ADMIN — MANNITOL 50 G: 20 INJECTION, SOLUTION INTRAVENOUS at 14:37

## 2024-03-22 RX ADMIN — OXYCODONE HYDROCHLORIDE 5 MG: 5 TABLET ORAL at 17:35

## 2024-03-22 RX ADMIN — HYDROMORPHONE HYDROCHLORIDE 0.5 MG: 1 INJECTION, SOLUTION INTRAMUSCULAR; INTRAVENOUS; SUBCUTANEOUS at 15:17

## 2024-03-22 RX ADMIN — HYDROMORPHONE HYDROCHLORIDE 0.5 MG: 1 INJECTION, SOLUTION INTRAMUSCULAR; INTRAVENOUS; SUBCUTANEOUS at 18:58

## 2024-03-22 ASSESSMENT — CONF VISUAL FIELD
METHOD: COUNTING FINGERS
OD_INFERIOR_TEMPORAL_RESTRICTION: 1
OS_INFERIOR_TEMPORAL_RESTRICTION: 0
OS_INFERIOR_NASAL_RESTRICTION: 0
OD_SUPERIOR_TEMPORAL_RESTRICTION: 3
OS_SUPERIOR_TEMPORAL_RESTRICTION: 0
OS_SUPERIOR_NASAL_RESTRICTION: 0
OD_INFERIOR_NASAL_RESTRICTION: 3
OS_NORMAL: 1
OD_SUPERIOR_NASAL_RESTRICTION: 3

## 2024-03-22 ASSESSMENT — VISUAL ACUITY
METHOD: SNELLEN - LINEAR
OD_SC: 20/600
OD_PH_SC: 20/500
OS_SC: 20/25
OS_SC+: -1

## 2024-03-22 ASSESSMENT — GONIOSCOPY: OS_NASAL: NO ANGLE STRUCTURES

## 2024-03-22 ASSESSMENT — ACTIVITIES OF DAILY LIVING (ADL)
ADLS_ACUITY_SCORE: 33
ADLS_ACUITY_SCORE: 35

## 2024-03-22 ASSESSMENT — TONOMETRY
OS_IOP_MMHG: 25
OD_IOP_MMHG: 45
OD_IOP_MMHG: 45
OS_IOP_MMHG: 25
IOP_METHOD: TONOPEN
IOP_METHOD: TONOPEN

## 2024-03-22 ASSESSMENT — SLIT LAMP EXAM - LIDS
COMMENTS: PROTECTIVE PTOSIS
COMMENTS: NORMAL

## 2024-03-22 ASSESSMENT — EXTERNAL EXAM - LEFT EYE: OS_EXAM: NORMAL

## 2024-03-22 ASSESSMENT — EXTERNAL EXAM - RIGHT EYE: OD_EXAM: NORMAL

## 2024-03-22 NOTE — ED TRIAGE NOTES
Arrives ambulatory with a referral for ophthalmology. Per patient blurred vision in right eye started Sunday.      Triage Assessment (Adult)       Row Name 03/22/24 1310          Triage Assessment    Airway WDL WDL        Respiratory WDL    Respiratory WDL WDL        Skin Circulation/Temperature WDL    Skin Circulation/Temperature WDL WDL        Cardiac WDL    Cardiac WDL WDL        Peripheral/Neurovascular WDL    Peripheral Neurovascular WDL WDL        Cognitive/Neuro/Behavioral WDL    Cognitive/Neuro/Behavioral WDL WDL

## 2024-03-22 NOTE — NURSING NOTE
"Chief Complaints and History of Present Illnesses   Patient presents with    Glaucoma Follow-Up     Acute angle-closure glaucoma of right eye     Chief Complaint(s) and History of Present Illness(es)       Glaucoma Follow-Up              Laterality: right eye    Associated symptoms: eye pain, headache and photophobia.  Negative for nausea, vomiting, flashes and floaters    Pain scale: 0/10    Comments: Acute angle-closure glaucoma of right eye              Comments    Pt states vision is 5-10% better.  Pain 7/10.  No flashes/floaters.  Headache around eyes RE>LE.    Diamox pill 2 times per day   Brimonidine, Timolol, and Latanoprost eye drops 3 times per day in both eyes   Pt did not put drops in LE this morning, only RE.    Hx of DM 2 but got it under control  No results found for: \"A1C\"    FREDI Meza March 22, 2024 8:23 AM                       "

## 2024-03-22 NOTE — ED NOTES
Emergency Department Patient Sign-out       Brief HPI:  This is a 38 year old male signed out to me by Dr. TY Martell .  See initial ED Provider note for details of the presentation.            Significant Events prior to my assuming care: mannitol      Exam:   Patient Vitals for the past 24 hrs:   BP Temp Temp src Pulse Resp SpO2   03/22/24 1308 136/75 98  F (36.7  C) Oral 59 20 100 %           ED RESULTS:   Results for orders placed or performed during the hospital encounter of 03/22/24 (from the past 24 hour(s))   CBC with platelets differential     Status: None    Collection Time: 03/22/24  2:27 PM    Narrative    The following orders were created for panel order CBC with platelets differential.  Procedure                               Abnormality         Status                     ---------                               -----------         ------                     CBC with platelets and d...[[id number]]                      Final result                 Please view results for these tests on the individual orders.   Basic metabolic panel     Status: Abnormal    Collection Time: 03/22/24  2:27 PM   Result Value Ref Range    Sodium 137 135 - 145 mmol/L    Potassium 4.7 3.4 - 5.3 mmol/L    Chloride 108 (H) 98 - 107 mmol/L    Carbon Dioxide (CO2) 19 (L) 22 - 29 mmol/L    Anion Gap 10 7 - 15 mmol/L    Urea Nitrogen 14.8 6.0 - 20.0 mg/dL    Creatinine 0.85 0.67 - 1.17 mg/dL    GFR Estimate >90 >60 mL/min/1.73m2    Calcium 9.2 8.6 - 10.0 mg/dL    Glucose 115 (H) 70 - 99 mg/dL   CBC with platelets and differential     Status: None    Collection Time: 03/22/24  2:27 PM   Result Value Ref Range    WBC Count 6.7 4.0 - 11.0 10e3/uL    RBC Count 5.29 4.40 - 5.90 10e6/uL    Hemoglobin 16.8 13.3 - 17.7 g/dL    Hematocrit 49.0 40.0 - 53.0 %    MCV 93 78 - 100 fL    MCH 31.8 26.5 - 33.0 pg    MCHC 34.3 31.5 - 36.5 g/dL    RDW 12.6 10.0 - 15.0 %    Platelet Count 164 150 - 450 10e3/uL    % Neutrophils 81 %    % Lymphocytes  12 %    % Monocytes 5 %    % Eosinophils 1 %    % Basophils 1 %    % Immature Granulocytes 0 %    NRBCs per 100 WBC 0 <1 /100    Absolute Neutrophils 5.4 1.6 - 8.3 10e3/uL    Absolute Lymphocytes 0.8 0.8 - 5.3 10e3/uL    Absolute Monocytes 0.3 0.0 - 1.3 10e3/uL    Absolute Eosinophils 0.1 0.0 - 0.7 10e3/uL    Absolute Basophils 0.0 0.0 - 0.2 10e3/uL    Absolute Immature Granulocytes 0.0 <=0.4 10e3/uL    Absolute NRBCs 0.0 10e3/uL       ED MEDICATIONS:   Medications   sodium chloride (PF) 0.9% PF flush 3 mL (3 mLs Intracatheter $Given 3/22/24 1428)   sodium chloride (PF) 0.9% PF flush 3 mL (has no administration in time range)   HYDROmorphone (PF) (DILAUDID) injection 0.5 mg (0.5 mg Intravenous $Given 3/22/24 1517)   ondansetron (ZOFRAN) injection 4 mg (4 mg Intravenous $Given 3/22/24 1517)   mannitol 20 % infusion 50 g (has no administration in time range)   mannitol 20 % infusion 50 g (0 g Intravenous Stopped 3/22/24 1735)   oxyCODONE (ROXICODONE) tablet 5 mg (5 mg Oral $Given 3/22/24 1735)         Impression:    ICD-10-CM    1. Glaucoma of right eye, unspecified glaucoma type  H40.9           Plan:    Pending studies include ophtho-IOP still high, requested second dose of mannitol, keep NPO, will sign off to incoming EP.        Sheyla Villanueva MD, Sheyla Monaco MD  03/22/24 6045

## 2024-03-22 NOTE — PROGRESS NOTES
"  OPHTHALMOLOGY ACUTE CLINIC NOTE  03/22/24      Subjective:    HPI:     HPI       Glaucoma Follow-Up    In right eye.  Associated symptoms include eye pain, headache and photophobia.  Negative for nausea, vomiting, flashes and floaters.  Pain was noted as 0/10. Additional comments: Acute angle-closure glaucoma of right eye             Comments    Pt states vision is 5-10% better.  Pain 7/10.  No flashes/floaters.  Headache around eyes RE>LE.    Diamox pill 2 times per day   Brimonidine, Timolol, and Latanoprost eye drops 3 times per day in both eyes   Pt did not put drops in LE this morning, only RE.    Hx of DM 2 but got it under control  No results found for: \"A1C\"    FREDI Meza March 22, 2024 8:23 AM              Last edited by Lee Ann Mesa COT on 3/22/2024  8:23 AM.        HPI per ED visit and confirmed with patient:     Power Blanco is a 38 year old male who presented to the ED 3/20/24 for right eye vision loss and pain. Friday 3/15/24 he took sildenafil then shortly thereafter each eye developed blurry vision. He woke up with right eye pain that lasted the whole day but slowly improved. He then took another dose of sildenafil Saturday night and both eyes had blurry vision again. When he woke up only the right eye was blurry and painful. He presented to receive medical attention Sunday but the wait was too long at the ED so he left. The right eye vision loss (50% of his normal vision) and aching eye pain have persisted prompting presentation today. The entire visual field is blurry. Pain is dull/aching and lights or pressure on the eye make the pain worse. Has had a right eye corneal abrasion in the past while playing basketball that hurt more than this. Tried visine with no improvement.     No trauma or foreign bodies. Pain started seemingly out of nowhere.   No personal or family history of sickle cell or glaucoma.   Review of systems were otherwise negative except for that which has been " stated above.    Past Ocular History:  Last eye exam: 3/20/24  Prior eye surgery/laser: None  Contact lens wear: No  Glasses: None  Eyedrops: Visine prn      Family History:  No family history of sickle cell or glaucoma     PMH:   Past Medical History:   Diagnosis Date    Diabetes mellitus, type 2 (H)     Glaucoma (increased eye pressure)      FH: None glaucoma or AMD.     Review of systems for the eyes was negative other than the pertinent positives/negatives listed in the HPI.      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 There have been reports of sildenafil associated angle closure though the mechanism has not been confirmed. His angle closure may have been precipitated by pupillary dilation via sympathetic drive and dark lighting surrounding the use of sildenafil. Sulfa induced choroidal effusion as a posterior pushing mechanism was considered but ruled out with bedside ultrasound which was confirmed prior to administration of diamox. 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 No evidence of an anterior pulling mechanism on exam, though all angle structures were obscured by iris right eye. His pressure responded to topical IOP lowering medications and one dose of 500 mg diamox, from unreadably high to the 40s and 50s right eye and from 42 to the 20s left eye. He was discharged from the ED for immediate LPI each eye in clinic. See  procedure note from that visit. We discussed the r/b/a to LPI each eye and the patient agreed to proceed. We also discussed that the degree of permanent vision loss secondary to IOP induced optic nerve damage was unknown at this time and would need to be assessed after successful LPI.     - 03/22/24 Interim Summary:   3/20/24, LPI unsuccessful due to patient intolerance with only partial thickness iridotomies achieved. Also technically challenging due to heavily pigmented irides, resulting in iatrogenic hyphema OU. Patient discharged with max drop therapy sans latanoprost and diamox sequels 500 mg bid.   03/22/24 on exam vision is 20/500 and 20/25 -1; pressures 45 and 25; corneal clouding OU with AC exam showing resolution of hyphema, shallow chamber OU, poor view of aqueous contents through edematous cornea right eye, though quiet left; both eyes showing 4+ RBC.   Formal UBM obtained in clinic which did not demonstrate e/o choroidal effusions to suggest posterior pushing mechanism which would be unrelieved/potentially worsened by LPI  R/b/a of LPI discussed with patient and consented for repeat of procedure; see note below.    Plan:  -LPI was successful left eye but not right eye due to corneal edema  -IOP remained unchanged after LPI each eye    DD: Acute angle closure, chronic angle closure exacerbated by medication use    - Continue antitensive regimen: Latanoprost, timolol, and brimonidine each eye TID   - STOP diamox PO  - Recommend 0.5 g/kg mannitol (~50 g) IV in the emergency department with close monitoring and pressure check   - Start prednisolone acetate drops four times a day right eye  - Start ofloxacin four times a day  right eye  - Follow up at 1:30 PM on 3/23/24 (tomorrow) with Dr. Rios:     [facility] 4th Floor  [address] [place], [place], [address]  Phone: [phone]    Patient disposition:   No follow-ups on file.    Patient seen with Dr. Guerrero, Dr. Gutierrez, and   Luciano Rios MD  PGY-2, Ophthalmology  [facility]  03/22/24 8:23 AM      The patient returned to clinic to receive LPI each eye for repeat attempt, to treat acute angle closure right eye and narrow angles left eye. Please see consult note from 3/20/24 for details. R/B/A to LPI each eye were discussed with the patient who agreed to proceed with the procedure in both eyes.     Brief Operative Note:    Date of Service: 03/22/24     Attending: Levi Guerrero MD  Assistant: Shayne Rios MD    Preoperative diagnosis: Narrow angles, both eyes  Postoperative diagnosis: same  Procedure: laser peripheral iridotomy (LPI) both eyes  Anesthesia: Topical lidocaine  Complications: None    One drop of 1% pilocarpine was given in both eyes approximately 30 minutes prior to the procedure. After informed consent was obtained the patient was brought to the Argon/YAG laser room and given one drop of proparacaine in the operative eyes. Attention was directed to the left eye. An Riley iridotomy lens was used to view the peripheral iris at 1 o' clock. Argon laser with spot size 500 nm, 0.05s, 2.5 mJ, 1 pulse per burst were applied to an iris crypt with adequate response. The same procedure was performed in the right eye at a 1 o'clock superonasal crypt approximately 90 degrees from the prior site of attempted LPI. The patient was transferred to the YAG laser and attention was directed to the left eye. An Riley iridotomy lens was used to view the peripheral iris at 1 o' clock. 8 mJ for 5 shots, 1 pulse per burst were applied to an iris crypt with an appropriate fluid response. Both eyes were inspected and it was deemed that no further laser could be applied safely through the hemorrhages and the procedure was stopped. The patient tolerated the procedure well. One drop of Iopidine was given. The Riley lens was removed.    The patient was informed that hemorrhage precluded confirmation of successful  LPI in both eyes and that re-evaluation and possible repeat procedure would be required at follow up once hemorrhage cleared.    IOP check after the procedure was unchanged from prior, 45 and 25.     Shayne Rios MD  PGY-2, Ophthalmology  [facility]  03/22/24    Attending Physician Attestation: Complete documentation of historical and exam elements from today's encounter can be found in the full encounter summary report (not reduplicated in this progress note). I personally obtained the chief complaint(s) and history of present illness. I confirmed and edited as necessary the review of systems, past medical/surgical history, family history, social history, and examination findings as documented by others; and I examined the patient myself. I personally reviewed the relevant tests, images, and reports as documented above. I formulated and edited as necessary the assessment and plan and discussed the findings and management plan with the patient and family. I personally reviewed the ophthalmic test(s) associated with this encounter, agree with the interpretation(s) as documented by the resident/fellow, and have edited the corresponding report(s) as necessary. I was present for the entire procedure(s). - Levi Guerrero M.D

## 2024-03-22 NOTE — ED PROVIDER NOTES
[facility]ERGENCY DEPARTMENT ([facility])    3/22/24       ED PROVIDER NOTE   Vertical Triage B     History     Chief Complaint   Patient presents with    Loss of Vision    Eye Problem     The history is provided by the patient and medical records.     Power Blanoc is a 38 year old male who has a recent history of glaucoma and was started on Diamox and Alphagan and Xalatan and was seen in the ophthalmology clinic today.  The patient was found to have persistent elevated intraocular pressures of his right eye and underwent some laser treatment in the clinic today but this was unsuccessful at lowering his intraocular pressure.  The patient was sent here to the ER for evaluation.  Patient complains of pain but no vomiting, no fevers, no chest pain, or shortness of breath.    This part of the document was transcribed by Marilyn Jang, Medical Scribe.      I have reviewed the Medications, Allergies, Past Medical and Surgical History, and Social History in the [facility] system.    Past Medical History:   Diagnosis Date    Diabetes mellitus, type 2 (H)     Glaucoma (increased eye pressure)        No past surgical history on file.        Dose / Directions   acetaZOLAMIDE 500 MG 12 hr capsule  Commonly known as: DIAMOX SEQUEL  Used for: Acute angle-closure glaucoma of right eye      Dose: 500 mg  Take 1 capsule (500 mg) by mouth 2 times daily  Quantity: 20 capsule  Refills: 1     brimonidine 0.2 % ophthalmic solution  Commonly known as: ALPHAGAN  Used for: Acute angle-closure glaucoma of right eye      Dose: 1 drop  Place 1 drop into both eyes 3 times daily  Quantity: 5 mL  Refills: 1     latanoprost 0.005 % ophthalmic solution  Commonly known as: XALATAN  Used for: Acute angle-closure glaucoma of right eye      Dose: 1 drop  Place 1 drop into both eyes daily  Quantity: 7.5 mL  Refills: 0     ofloxacin 0.3 % ophthalmic solution  Commonly known as: OCUFLOX  Used for: Abrasion of right cornea, initial encounter       Dose: 1-2 drop  Place 1-2 drops into the right eye 4 times daily  Quantity: 10 mL  Refills: 2     prednisoLONE acetate 1 % ophthalmic suspension  Commonly known as: PRED FORTE  Used for: Acute angle-closure glaucoma of right eye      Dose: 1-2 drop  Place 1-2 drops into the right eye 4 times daily  Quantity: 10 mL  Refills: 1         Past medical history, past surgical history, medications, and allergies were reviewed with the patient. Additional pertinent items: None    Family History   Problem Relation Age of Onset    Glaucoma No family hx of     Macular Degeneration No family hx of        Social History     Tobacco Use    Smoking status: Every Day     Types: Cigarettes, Cigars     Passive exposure: Never    Smokeless tobacco: Never   Substance Use Topics    Alcohol use: Not Currently     Social history was reviewed with the patient. Additional pertinent items: None    No Known Allergies    Review of Systems   All other systems reviewed and are negative.    A medically appropriate review of systems was performed with pertinent positives and negatives noted in the HPI, and all other systems negative.    Physical Exam   BP: 136/75  Pulse: 59  Temp: 98  F (36.7  C)  Resp: 20  SpO2: 100 %      Physical Exam  Vitals and nursing note reviewed.   Constitutional:       Comments: Hunched over protecting his eyes from the light   HENT:      Head: Atraumatic.   Neck:      Comments: Airway patent  Pulmonary:      Breath sounds: Normal breath sounds.   Musculoskeletal:         General: No deformity.      Cervical back: Neck supple.   Neurological:      Mental Status: He is alert and oriented to person, place, and time.   Psychiatric:      Comments: Congruent for clinical presentation         ED Course     Orders Placed This Encounter   Procedures    Basic metabolic panel    CBC with platelets and differential    Peripheral IV catheter    CBC with platelets differential     Medications   sodium chloride (PF) 0.9% PF flush 3  mL (3 mLs Intracatheter $Given 3/22/24 1420)   sodium chloride (PF) 0.9% PF flush 3 mL (has no administration in time range)   HYDROmorphone (PF) (DILAUDID) injection 0.5 mg (0.5 mg Intravenous $Given 3/22/24 1517)   ondansetron (ZOFRAN) injection 4 mg (4 mg Intravenous $Given 3/22/24 1517)   mannitol 20 % infusion 50 g (50 g Intravenous $New Bag 3/22/24 1436)          Procedures              Results for orders placed or performed during the hospital encounter of 03/22/24 (from the past 24 hour(s))   CBC with platelets differential    Narrative    The following orders were created for panel order CBC with platelets differential.  Procedure                               Abnormality         Status                     ---------                               -----------         ------                     CBC with platelets and d...[[id number]]                      Final result                 Please view results for these tests on the individual orders.   Basic metabolic panel   Result Value Ref Range    Sodium 137 135 - 145 mmol/L    Potassium 4.7 3.4 - 5.3 mmol/L    Chloride 108 (H) 98 - 107 mmol/L    Carbon Dioxide (CO2) 19 (L) 22 - 29 mmol/L    Anion Gap 10 7 - 15 mmol/L    Urea Nitrogen 14.8 6.0 - 20.0 mg/dL    Creatinine 0.85 0.67 - 1.17 mg/dL    GFR Estimate >90 >60 mL/min/1.73m2    Calcium 9.2 8.6 - 10.0 mg/dL    Glucose 115 (H) 70 - 99 mg/dL   CBC with platelets and differential   Result Value Ref Range    WBC Count 6.7 4.0 - 11.0 10e3/uL    RBC Count 5.29 4.40 - 5.90 10e6/uL    Hemoglobin 16.8 13.3 - 17.7 g/dL    Hematocrit 49.0 40.0 - 53.0 %    MCV 93 78 - 100 fL    MCH 31.8 26.5 - 33.0 pg    MCHC 34.3 31.5 - 36.5 g/dL    RDW 12.6 10.0 - 15.0 %    Platelet Count 164 150 - 450 10e3/uL    % Neutrophils 81 %    % Lymphocytes 12 %    % Monocytes 5 %    % Eosinophils 1 %    % Basophils 1 %    % Immature Granulocytes 0 %    NRBCs per 100 WBC 0 <1 /100    Absolute Neutrophils 5.4 1.6 - 8.3 10e3/uL    Absolute  Lymphocytes 0.8 0.8 - 5.3 10e3/uL    Absolute Monocytes 0.3 0.0 - 1.3 10e3/uL    Absolute Eosinophils 0.1 0.0 - 0.7 10e3/uL    Absolute Basophils 0.0 0.0 - 0.2 10e3/uL    Absolute Immature Granulocytes 0.0 <=0.4 10e3/uL    Absolute NRBCs 0.0 10e3/uL       Critical care was not performed.     Medical Decision Making  The patient's presentation was of moderate complexity (an acute illness with systemic symptoms).    The patient's evaluation involved:  ordering and/or review of 3+ test(s) in this encounter (see above)  discussion of management or test interpretation with another health professional (ophthalmology)    The patient's management necessitated further care after sign-out to my partners (see their note for further management).      Assessments & Plan (with Medical Decision Making)     I have reviewed the nursing notes.       Final diagnoses:   Glaucoma of right eye, unspecified glaucoma type       PEACE MADISON MD    3/22/2024   [facility] EMERGENCY DEPARTMENT       Peace Madiosn MD  03/22/24 0134

## 2024-03-22 NOTE — PATIENT INSTRUCTIONS
Continue brimonidine, timolol, and dorzolamide drops three times per day in the right eye  STOP oral diamox after the mannitol infusion  Start prednisone drops four times per day in the right eye  Go to the emergency department for mannitol by IV administration  We will follow up with you in the emergency department to check your pressure    Follow up tomorrow, 3/23/24 at the [facility] at 1:30 PM:    [facility], 4th Floor  [address] [place] [address], [address]  Phone: [phone]

## 2024-03-23 ENCOUNTER — OFFICE VISIT (OUTPATIENT)
Dept: OPHTHALMOLOGY | Facility: CLINIC | Age: 39
End: 2024-03-23

## 2024-03-23 DIAGNOSIS — S05.01XA ABRASION OF RIGHT CORNEA, INITIAL ENCOUNTER: ICD-10-CM

## 2024-03-23 DIAGNOSIS — H40.039 ANATOMICAL NARROW ANGLE: Primary | ICD-10-CM

## 2024-03-23 DIAGNOSIS — H40.211 ACUTE ANGLE-CLOSURE GLAUCOMA OF RIGHT EYE: ICD-10-CM

## 2024-03-23 PROCEDURE — 99213 OFFICE O/P EST LOW 20 MIN: CPT | Performed by: STUDENT IN AN ORGANIZED HEALTH CARE EDUCATION/TRAINING PROGRAM

## 2024-03-23 ASSESSMENT — VISUAL ACUITY
METHOD: SNELLEN - LINEAR
OS_SC: 20/30-1
OD_SC: 20/150
OD_PH_SC: 20/125-1
OS_PH_SC: 20/25-2

## 2024-03-23 ASSESSMENT — TONOMETRY
OS_IOP_MMHG: 19
OD_IOP_MMHG: 21
IOP_METHOD: TONOPEN

## 2024-03-23 ASSESSMENT — EXTERNAL EXAM - RIGHT EYE: OD_EXAM: NORMAL

## 2024-03-23 ASSESSMENT — EXTERNAL EXAM - LEFT EYE: OS_EXAM: NORMAL

## 2024-03-23 ASSESSMENT — SLIT LAMP EXAM - LIDS: COMMENTS: NORMAL

## 2024-03-23 NOTE — PATIENT INSTRUCTIONS
Continue brimonidine (purple), timolol (yellow) two times per day in the right eye   Continue latanoprost (teal) drops at bedtime in the right eye  Continue diamox 500 mg 2 times per day (pill)  Start prednisone drops four times per day in the right eye ( at [facility])  Continue ofloxacin (tan cap) 1 drop 4 times per day       Follow up tomorrow, 3/26/24 at the [facility] 9th floor (our  will call you on Monday to set up a time for your appointment)

## 2024-03-23 NOTE — INTERIM SUMMARY
Brief Ophthalmology Note    Patient sent to ED for IV mannitol infusion given unresponsive ocular hypertension secondary to angle closure.     IOP check 1 and 2 hours after first .5g/kg dose of mannitol was still in the 40's so another .5g/kg dose of mannitol was given with IOP of 34 1.5 hours after infusion (with contact lens in and out of the eye) and significant symptom improvement (though patient did receive pain medications) and subjective vision improvement.    Discussed case with Dr. Wolf. Plan is to discharge patient home tonight on all currently prescribed drops plus vigamox QID and prednisolone prescribed earlier today in clinic. He will hold his evening diamox dose today and take 1 dose tomorrow morning. He was also instructed to remain NPO after waking tomorrow morning and to present to the [facility] for a pressure check as scheduled 3/23/24. BCL was replaced after final IOP check.     Sridhar Childers MD  Resident Physician, PGY-2  Department of Ophthalmology

## 2024-03-23 NOTE — ED PROVIDER NOTES
Emergency Department Patient Sign-out       Brief HPI and ED course:  Patient is a 38 year old male signed out to me by the previous physician.  See initial ED Provider note for details of the presentation. In brief, 38-year-old male recently diagnosed with glaucoma coming to the ophthalmology clinic today with elevated right pressure in his eye    Vitals:   Patient Vitals for the past 24 hrs:   BP Temp Temp src Pulse Resp SpO2   03/22/24 1949 127/67 -- -- -- -- 100 %   03/22/24 1809 (!) 148/85 -- -- -- -- --   03/22/24 1308 136/75 98  F (36.7  C) Oral 59 20 100 %       Received Sign-out Plan:    Pending:   -Ophthalmology reevaluation    Events after assuming care:  After care was assumed, a focused history and physical was performed. Agree with findings relayed by previous provider.     After another dose of mannitol, patient's eye pressure has come down into the 30s.  The eye doctor at this point in time believe that is appropriate for him to be discharged and follow-up in their clinic tomorrow for intervention at that point in time.  He does have a corneal abrasion from evaluation and treatment today which they have prescribed him some antibiotics for.  He is continuing to endorse some pain.  Will give a dose of oxycodone as well as a couple of doses for overnight tonight.     --  Anuradha Vazquez MD   Emergency Medicine         Anuradha Vazquez MD  03/22/24 7984

## 2024-03-23 NOTE — LETTER
March 23, 2024    RE:  Power Blanco                              [address]  [place] [facility] [place] [address]            To whom it may concern:    Power Blanco is under my professional care for elevated eye pressure acute angle closure, he underwent laser procedure in both eyes on 3/22/24. Please excuse him from work until his next ophthalmology evaluation on 3/26/24.        Sincerely,      Eli Gutierrez MD

## 2024-03-23 NOTE — DISCHARGE INSTRUCTIONS
You were seen in the ER due to vision changes and eye pain.  You are found to have evidence of high  pressure in your eye consistent with something called glaucoma.  You were seen by the ophthalmologist here, given further medications, and do have a small scratch on your eye as well on the right eye.    Your eye pressures have come down some, and at this point in time the biggest concern is that you could have ongoing issues with this, and therefore need to follow-up with the ophthalmologist tomorrow morning.  This is very important as if you do not follow-up with them, at risk of losing vision in this eye.  Return to the emergency department with any new or other worsening severe symptoms, including any loss of vision, or any other concerning symptoms.  Otherwise follow ophthalmology in the morning and take your drops as prescribed

## 2024-03-23 NOTE — PROGRESS NOTES
OPHTHALMOLOGY CLINIC NOTE  03/23/24      Subjective:    HPI:         HPI per ED visit and confirmed with patient:     Power Blanco is a 38 year old male who presented to the ED 3/20/24 for right eye vision loss and pain. Friday 3/15/24 he took sildenafil then shortly thereafter each eye developed blurry vision. He woke up with right eye pain that lasted the whole day but slowly improved. He then took another dose of sildenafil Saturday night and both eyes had blurry vision again. When he woke up only the right eye was blurry and painful. He presented to receive medical attention Sunday but the wait was too long at the ED so he left. The right eye vision loss (50% of his normal vision) and aching eye pain have persisted prompting presentation today. The entire visual field is blurry. Pain is dull/aching and lights or pressure on the eye make the pain worse. Has had a right eye corneal abrasion in the past while playing basketball that hurt more than this. Tried visine with no improvement.     No trauma or foreign bodies. Pain started seemingly out of nowhere.   No personal or family history of sickle cell or glaucoma.   Review of systems were otherwise negative except for that which has been stated above.      Interval 3/23/24: Here for pressure check. No headache, eye is a bit sore. Vision and pain much improved. Was in ED yesterday s/p IV mannitol for angle closure.   Drops:  Brimonidine used this AM  Ofloxacin used this AM  Timolol used this AM  Latanoprost used this AM  Diamox 500mg this AM       Past Ocular History:  Last eye exam: 3/20/24  Prior eye surgery/laser: None  Contact lens wear: No  Glasses: None  Eyedrops: Visine prn      Family History:  No family history of sickle cell or glaucoma     PMH:   Past Medical History:   Diagnosis Date    Diabetes mellitus, type 2 (H)     Glaucoma (increased eye pressure)      FH: None glaucoma or AMD.     Review of systems for the eyes was negative other than the  pertinent positives/negatives listed in the HPI.      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 There have been reports of sildenafil associated angle closure though the mechanism has not been confirmed. His angle closure may have been precipitated by pupillary dilation via sympathetic drive and dark lighting surrounding the use of sildenafil. Sulfa induced choroidal effusion as a posterior pushing mechanism was considered but ruled out with bedside ultrasound which was confirmed prior to administration of diamox. 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 No evidence of an anterior pulling mechanism on exam, though all angle structures were obscured by iris right eye. His pressure responded to topical IOP lowering medications and one dose of 500 mg diamox, from unreadably high to the 40s and 50s right eye and from 42 to the 20s left eye. He was discharged from the ED for immediate LPI each eye in clinic. See procedure note from that visit. We discussed the r/b/a to LPI each eye and the patient agreed to proceed. We also discussed that the degree of permanent vision loss secondary to IOP induced optic nerve damage was unknown at this time and would need to be assessed after successful LPI.     - 03/22/24 Interim Summary:   3/20/24, LPI unsuccessful due to patient intolerance with only partial thickness iridotomies achieved. Also technically  challenging due to heavily pigmented irides, resulting in iatrogenic hyphema OU. Patient discharged with max drop therapy sans latanoprost and diamox sequels 500 mg bid.   03/22/24 on exam vision is 20/500 and 20/25 -1; pressures 45 and 25; corneal clouding OU with AC exam showing resolution of hyphema, shallow chamber OU, poor view of aqueous contents through edematous cornea right eye, though quiet left; both eyes showing 4+ RBC.   Formal UBM obtained in clinic which did not demonstrate e/o choroidal effusions to suggest posterior pushing mechanism which would be unrelieved/potentially worsened by LPI  R/b/a of LPI discussed with patient and consented for repeat of procedure; see note below.    3/23/24: VA and pain/headache much improved. IOP improved wnl today right eye 21, left eye 19. Corneal edema clearing right eye. Drance heme right eye? Hazy view due to corneal edema and corneal epithelial defect but no other obvious heme and no obvious venous congestion/tortuosity    DD: Acute angle closure, chronic angle closure exacerbated by medication use (CB congestion in context sildenafil)    Plan:   Continue brimonidine (purple), timolol (yellow) two times per day in the right eye   Continue latanoprost (teal) drops at bedtime in the right eye  Continue diamox 500 mg 2 times per day (pill)  Start prednisone drops four times per day in the right eye   Continue ofloxacin (tan cap) 1 drop 4 times per day   Replaced BCL right eye  Start Artificial tears right eye   Counseled strict return precautions    Provided work excuse letter  Communicated exam findings with Dr. Wolf    Patient disposition:   Follow up Monday morning 845 AM with Dr. Wolf, patient will call if there is a snowstorm/ if he has transportation issues      Eli Gutierrez MD  Cornea and External Disease Fellow  [facility]       Attending Physician Attestation: Complete documentation of historical and exam elements from today's  encounter can be found in the full encounter summary report (not reduplicated in this progress note). I personally obtained the chief complaint(s) and history of present illness. I confirmed and edited as necessary the review of systems, past medical/surgical history, family history, social history, and examination findings as documented by others; and I examined the patient myself. I personally reviewed the relevant tests, images, and reports as documented above. I formulated and edited as necessary the assessment and plan and discussed the findings and management plan with the patient and family.   -Eli Gutierrez MD

## 2024-03-25 ENCOUNTER — TELEPHONE (OUTPATIENT)
Dept: OPHTHALMOLOGY | Facility: CLINIC | Age: 39
End: 2024-03-25

## 2024-03-25 DIAGNOSIS — H40.211 ACUTE ANGLE-CLOSURE GLAUCOMA OF RIGHT EYE: Primary | ICD-10-CM

## 2024-03-25 NOTE — TELEPHONE ENCOUNTER
[facility] Call Center    Phone Message    May a detailed message be left on voicemail: yes     Reason for Call: Follow up Monday morning 845 AM with Dr. Wolf, patient will call if there is a snowstorm/ if he has transportation issues per Dr Gutierrez, Pt advised he needs to R/S with Dr Wolf per Dr Gutierrez VA, IOP, PACH, LVC RE, 24-2 LE - per Dr. Gutierrez, writers next avail is 5/7/24 pt advised he needs seen this week Please contact pt to discuss options. Thank you!     Action Taken: Message routed to:  [facility] eye    Travel Screening: Not Applicable

## 2024-03-25 NOTE — TELEPHONE ENCOUNTER
Called and confirmed 3/26 @ 1245 pm appointment with Dr. Luciano Barksdale Communication Facilitator on 3/25/2024 at 10:30 AM

## 2024-03-26 ENCOUNTER — OFFICE VISIT (OUTPATIENT)
Dept: OPHTHALMOLOGY | Facility: CLINIC | Age: 39
End: 2024-03-26
Attending: OPHTHALMOLOGY

## 2024-03-26 ENCOUNTER — TELEPHONE (OUTPATIENT)
Dept: OPHTHALMOLOGY | Facility: CLINIC | Age: 39
End: 2024-03-26

## 2024-03-26 ENCOUNTER — PREP FOR PROCEDURE (OUTPATIENT)
Dept: OPHTHALMOLOGY | Facility: CLINIC | Age: 39
End: 2024-03-26

## 2024-03-26 ENCOUNTER — NURSE TRIAGE (OUTPATIENT)
Dept: NURSING | Facility: CLINIC | Age: 39
End: 2024-03-26

## 2024-03-26 DIAGNOSIS — H40.211 ACUTE ANGLE-CLOSURE GLAUCOMA OF RIGHT EYE: Primary | ICD-10-CM

## 2024-03-26 PROCEDURE — 92134 CPTRZ OPH DX IMG PST SGM RTA: CPT | Performed by: OPHTHALMOLOGY

## 2024-03-26 PROCEDURE — 250N000013 HC RX MED GY IP 250 OP 250 PS 637: Performed by: OPHTHALMOLOGY

## 2024-03-26 PROCEDURE — 92134 CPTRZ OPH DX IMG PST SGM RTA: CPT | Mod: 26 | Performed by: OPHTHALMOLOGY

## 2024-03-26 PROCEDURE — 92250 FUNDUS PHOTOGRAPHY W/I&R: CPT | Performed by: OPHTHALMOLOGY

## 2024-03-26 PROCEDURE — 99207 PR NO BILLABLE SERVICE THIS VISIT: CPT | Mod: 26 | Performed by: OPHTHALMOLOGY

## 2024-03-26 PROCEDURE — 66761 REVISION OF IRIS: CPT | Mod: RT | Performed by: OPHTHALMOLOGY

## 2024-03-26 PROCEDURE — 76512 OPH US DX B-SCAN: CPT | Performed by: OPHTHALMOLOGY

## 2024-03-26 PROCEDURE — 76512 OPH US DX B-SCAN: CPT | Mod: 26 | Performed by: OPHTHALMOLOGY

## 2024-03-26 RX ORDER — ACETAZOLAMIDE 250 MG/1
500 TABLET ORAL ONCE
Status: COMPLETED | OUTPATIENT
Start: 2024-03-26 | End: 2024-03-26

## 2024-03-26 RX ORDER — DORZOLAMIDE HYDROCHLORIDE AND TIMOLOL MALEATE 20; 5 MG/ML; MG/ML
1 SOLUTION/ DROPS OPHTHALMIC 2 TIMES DAILY
Qty: 10 ML | Refills: 5 | Status: SHIPPED | OUTPATIENT
Start: 2024-03-26

## 2024-03-26 RX ORDER — PROPARACAINE HYDROCHLORIDE 5 MG/ML
1 SOLUTION/ DROPS OPHTHALMIC ONCE
OUTPATIENT
Start: 2024-03-26 | End: 2024-03-26

## 2024-03-26 RX ADMIN — ACETAZOLAMIDE 500 MG: 250 TABLET ORAL at 10:42

## 2024-03-26 ASSESSMENT — TONOMETRY
OS_IOP_MMHG: 20
OD_IOP_MMHG: 49
IOP_METHOD: APPLANATION
IOP_METHOD: APPLANATION
OS_IOP_MMHG: 29
OD_IOP_MMHG: 53
OD_IOP_MMHG: 33
OD_IOP_MMHG: 14
OS_IOP_MMHG: 28
IOP_METHOD: TONOPEN
IOP_METHOD: TONOPEN
OD_IOP_MMHG: 28
IOP_METHOD: TONOPEN

## 2024-03-26 ASSESSMENT — VISUAL ACUITY
OD_SC: 20/400
OS_SC: 20/25
OS_SC+: -2
METHOD: SNELLEN - LINEAR
OD_PH_SC: 20/200

## 2024-03-26 ASSESSMENT — EXTERNAL EXAM - LEFT EYE: OS_EXAM: NORMAL

## 2024-03-26 ASSESSMENT — SLIT LAMP EXAM - LIDS: COMMENTS: NORMAL

## 2024-03-26 ASSESSMENT — EXTERNAL EXAM - RIGHT EYE: OD_EXAM: NORMAL

## 2024-03-26 NOTE — LETTER
March 26, 2024      RE: Power Blanco  [address]  [place] [address]       To whom it may concern:    Power Blanco was seen in our clinic today. He cannot return to work until 4/10 at the earliest. We will continue to evaluate him with close clinic visits to determine when he is appropriate to return     Sincerely,      Milton Wolf MD

## 2024-03-26 NOTE — TELEPHONE ENCOUNTER
Left voicemail for patient regarding scheduling surgery with Dr. Wolf.  Provided contact number to discuss.  [phone]    Erica Klein, on 3/26/2024 at 3:46 PM

## 2024-03-26 NOTE — PROGRESS NOTES
Chief Complaint/Presenting Concern: Glaucoma    History of Present Illness:   Power Blanco is a 38 year old patient who presents for evaluation of angle closure right eye. His initial presentation in March 2024 was concerning for sildenafil-related angle closure. He reports that in the past he has had similar small episodes of blurry vision and eye pain after using sildenafil. He initially responded to diamox and topical IOP medication. Subsequently an LPI was performed which was unsuccessful 3/26/24.     Today patient reports that while he has been very compliant with drops, he has not taken his diamox for the last 2 or more doses.     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   Year diagnosis  Previous glaucoma surgery/laser   -3/20/24 unsuccessful LPI  Maximum intraocular pressure 53/42  Current meds: see below  Family history: negative  CCT: At future visit  Gonio: right eye with 360 synechial closure, left eye with 270 appositional closure, 90 synechial  Refractive status: Reports no glasses wear as a child  Trauma history: positive - corneal abrasion right eye ~2022  Steroid exposure: negative  Vasospastic disease: Migrane/Raynaud phenomenon: negative  A past hemodynamic crisis or Low BP:: negative  Meds AEs/intolerance: None  PMHx: Positive for: None Negative for : Asthma and respiratory problems/Cardiac/Renal/Kidney stones/Sulfa Allergy  Anticoagulants: None    Today's testing:  B-scan right eye :  Retina  flat and attached, normal disc on U/S, negative for tears or holes, negative for other masses, lesions or elevations.  Optos each eye: Lopez montano right eye   OCT mac: wnl each eye     Additional Ocular History: None    Plan/Recommendations:  ? Discussed findings with patient. 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 There is likely a component of response to sildenafil.   ? Today IOP elevated right eye in the setting of non-adherence to diamox. IOP decreased with administration of diamox. Attempted LPI again right eye 03/26/24 - has new epithelial defect after the laser procedure and unclear if LPI patent after.  ? Continue current medications:  o Continue brimonidine (purple)  o Start cosopt (yellow) two times per day in both eyes  o Stop timolol  o Continue latanoprost (teal) drops at bedtime in the right eye  o Continue diamox 500 mg 2 times per day (pill)  o Continue prednisolone drops four times per day in the right eye   o Continue ofloxacin (tan cap) 1 drop 4 times per day right eye     RTC 2 days for VT, plan for primary trab right eye under GA    Thank you for entrusting us with your care  Karolina Bernardo MD, PGY3  Ophthalmology Resident  [facility]    Physician Attestation     Attending Physician Attestation:  Complete documentation of historical and exam elements from today's encounter can be found in the full encounter summary report (not reduplicated in this progress note). I personally obtained the chief complaint(s) and history of present illness. I confirmed and edited as necessary the review of systems, past medical/surgical history, family history, social history, and examination findings as documented by others; and I examined the patient myself. I personally reviewed the relevant tests, images, and reports as documented above. I personally reviewed the ophthalmic test(s) associated with this encounter, agree with the  interpretation(s) as documented by the resident/fellow and have edited the corresponding report(s) as necessary. I formulated and edited as necessary the assessment and plan and discussed the findings and management plan with the patient and any family members present at the time of the visit.  Milton Wolf M.D., Glaucoma, March 26, 2024

## 2024-03-26 NOTE — TELEPHONE ENCOUNTER
Nurse Triage SBAR    Is this a 2nd Level Triage?  Yes    Situation:  Severe Right eye pain/Calling On Call provider and wanting to be seen today in clinic due to severe eye pain    Background/Assessment:    Pt reporting severe pain in the right eye.        Pt thinks the contact lens in the right eye fell out.      The right eye is very painful, red, irritated, dry, and blurry vision.    Wife mentioned they are going to stop by the clinic after they drop the kids off from school.    I gave them the number to call the On call for a heads up for further advise.   But wanting to be seen ASAP.    Please call the family back @ [phone] for further assistance.    Maryam Kirk RN  Central Triage Red Flags/Med Refills        Protocol Recommended Disposition:   See Today In Office      Reason for Disposition   Patient wants to be seen    Additional Information   Negative: Followed an eye injury   Negative: Eye pain from chemical in the eye   Negative: Eye pain from foreign body in eye   Negative: Has sinus pain or pressure   Negative: Severe eye pain   Negative: Complete loss of vision in one or both eyes   Negative: Eyelids are very swollen (shut or almost) and fever   Negative: Eyelid (outer) is very red and fever   Negative: Foreign body sensation ('feels like something is in there') and irrigation didn't help   Negative: Vomiting   Negative: Ulcer or sore seen on the cornea (clear center part of the eye)   Negative: Recent eye surgery and increasing eye pain   Negative: Blurred vision and new or worsening   Negative: Patient sounds very sick or weak to the triager   Negative: Eye pain/discomfort and more than mild   Negative: Eyelids are very swollen (shut or almost) and no fever   Negative: Painful rash near eye and multiple small blisters grouped together   Negative: Pain followed bright light exposure from welding   Negative: Looking at light causes severe pain (i.e., photophobia)   Negative: Yellow or green pus  occurs   Negative: Eye pain present > 24 hours    Protocols used: Eye Pain-A-[place]

## 2024-03-26 NOTE — TELEPHONE ENCOUNTER
Pt has appt today at 1245 already scheduled.    Home/mobile [phone]     Called times 4 and no ringing--just beep noise    Called using secure [facility] line and rang and then beep.    Left message that patient currently scheduled at 1245 PM today with Dr. Wolf and able to see sooner-- ok to come when able to get here safety and facilitator commented ok to start early on appt detail.    Arik Rangel RN 7:52 AM 03/26/24     Burow's Advancement Flap Text: The defect edges were debeveled with a #15 scalpel blade.  Given the location of the defect and the proximity to free margins a Burow's advancement flap was deemed most appropriate.  Using a sterile surgical marker, the appropriate advancement flap was drawn incorporating the defect and placing the expected incisions within the relaxed skin tension lines where possible.    The area thus outlined was incised deep to adipose tissue with a #15 scalpel blade.  The skin margins were undermined to an appropriate distance in all directions utilizing iris scissors.

## 2024-03-26 NOTE — PATIENT INSTRUCTIONS
Continue brimonidine (purple)  Start cosopt (yellow) two times per day in both eyes  Stop timolol  Continue latanoprost (teal) drops at bedtime in the right eye  Continue diamox 500 mg 2 times per day (pill)  Continue prednisolone drops four times per day in the right eye   Continue ofloxacin (tan cap) 1 drop 4 times per day right eye

## 2024-03-26 NOTE — TELEPHONE ENCOUNTER
Received page that patient's contact lens had fallen out and he was in pain. Called patient at number provided x 3. Call went to voicemail 3 times.    Left VM instructing patient to present for their appointment previously scheduled for today for replacement of contact lens or to repage me if this plan would not work.    Sridhar Childers MD  Resident Physician, PGY-2  Department of Ophthalmology

## 2024-03-26 NOTE — NURSING NOTE
"Chief Complaints and History of Present Illnesses   Patient presents with    Consult For     Follow up for Acute angle closure glaucoma right eye. Patient presented to the ED on 3/20/24 for right eye vision loss and eye pain.     \"Up until last night, my right eye felt comfortable. I had no pain, but then this morning I am in a lot of pain.\" Patient notes his bandage contact lens fell out this morning. Patient notes vision has regressed about 15-20% since last night in right eye. Denies family history of Glaucoma.      Chief Complaint(s) and History of Present Illness(es)       Consult For              Laterality: right eye    Onset: days ago    Quality: blurred vision    Course: gradually worsening    Associated symptoms: eye pain (constant sharp pain OD, especially with blinking), tearing and photophobia (worse outside)    Treatments tried: eye drops    Pain scale: 7/10    Comments: Follow up for Acute angle closure glaucoma right eye. Patient presented to the ED on 3/20/24 for right eye vision loss and eye pain.     \"Up until last night, my right eye felt comfortable. I had no pain, but then this morning I am in a lot of pain.\" Patient notes his bandage contact lens fell out this morning. Patient notes vision has regressed about 15-20% since last night in right eye. Denies family history of Glaucoma.               Comments    Ocular meds:   Brimonidine (purple), timolol (yellow) two times per day in the right eye   Latanoprost (teal) drops at bedtime in the right eye  Diamox 500 mg 2 times per day (pill)  Prednisone drops four times per day in the right eye   Ofloxacin (tan cap) 1 drop 4 times per day   Artificial tears right eye PRN    Ruma Washington, FREDI 9:25 AM 03/26/2024                       "

## 2024-03-27 PROBLEM — H40.211 ACUTE ANGLE-CLOSURE GLAUCOMA OF RIGHT EYE: Status: ACTIVE | Noted: 2024-03-26

## 2024-03-27 NOTE — TELEPHONE ENCOUNTER
FUTURE VISIT INFORMATION      SURGERY INFORMATION:  Date: 4/3/24  [facility]  Surgeon:  Milton Wolf MD   Anesthesia Type:  General with block  Procedure: TRABECULECTOMY, USING MITOMYCIN C RIGHT   Consult: ov 3/26/24    RECORDS REQUESTED FROM:         Most recent EKG+ Tracin23- [facility]

## 2024-03-27 NOTE — TELEPHONE ENCOUNTER
Spoke with the patient and was able to confirm all scheduled information.     Patient is scheduled for surgery with: Dr. Wolf    Surgery Date: 4/3     [facility]: [facility]    H&P: already completed by ER on 3/17, 3/20 and 3/22     Post-op:  4/11, 5/2, message sent regarding one day post op, in-person visit, with Dr Wolf    Patient will receive a phone call from pre-admission nurses 1-2 days prior to surgery with arrival time and NPO instructions.    Patient aware times are subject to change up until day before surgery.     Patient questions/concerns: Questions about recovery & restrictions. Will have RNCC reach out to patient to discuss.     Surgery packet was sent via US mail 3/27      Erica Klein on 3/27/2024 at 8:24 AM

## 2024-03-27 NOTE — TELEPHONE ENCOUNTER
Received message from [facility] stating that patient needs H & P that says he is cleared for anesthesia. Spoke to patient and PAC appointment is scheduled for 3/28.   Erica Klein on 3/27/2024 at 11:56 AM

## 2024-03-28 ENCOUNTER — OFFICE VISIT (OUTPATIENT)
Dept: OPHTHALMOLOGY | Facility: CLINIC | Age: 39
End: 2024-03-28
Attending: OPHTHALMOLOGY

## 2024-03-28 ENCOUNTER — PRE VISIT (OUTPATIENT)
Dept: SURGERY | Facility: CLINIC | Age: 39
End: 2024-03-28

## 2024-03-28 DIAGNOSIS — H40.2214 CHRONIC ANGLE-CLOSURE GLAUCOMA OF RIGHT EYE, INDETERMINATE STAGE: Primary | ICD-10-CM

## 2024-03-28 PROCEDURE — 99214 OFFICE O/P EST MOD 30 MIN: CPT | Mod: GC | Performed by: OPHTHALMOLOGY

## 2024-03-28 PROCEDURE — 99213 OFFICE O/P EST LOW 20 MIN: CPT | Performed by: OPHTHALMOLOGY

## 2024-03-28 ASSESSMENT — SLIT LAMP EXAM - LIDS: COMMENTS: NORMAL

## 2024-03-28 ASSESSMENT — VISUAL ACUITY
OS_SC+: -2
OS_SC: 20/30
OD_PH_SC: 20/125
METHOD: SNELLEN - LINEAR
OD_PH_SC+: -1
OD_SC: 20/300

## 2024-03-28 ASSESSMENT — TONOMETRY
OD_IOP_MMHG: BCL
OD_IOP_MMHG: 30
OS_IOP_MMHG: XX
IOP_METHOD: APPLANATION
IOP_METHOD: APPLANATION
OS_IOP_MMHG: 17

## 2024-03-28 ASSESSMENT — EXTERNAL EXAM - LEFT EYE: OS_EXAM: NORMAL

## 2024-03-28 ASSESSMENT — EXTERNAL EXAM - RIGHT EYE: OD_EXAM: NORMAL

## 2024-03-28 NOTE — NURSING NOTE
Chief Complaints and History of Present Illnesses   Patient presents with    Follow Up     Chronic angle closure glaucoma -3/20/24 unsuccessful LPI     Chief Complaint(s) and History of Present Illness(es)       Follow Up              Laterality: both eyes    Associated symptoms: tearing (intermittent) and photophobia.  Negative for eye pain and headache    Treatments tried: eye drops    Pain scale: 4/10    Comments: Chronic angle closure glaucoma -3/20/24 unsuccessful LPI              Comments    He states that he has some discomfort and blurred vision in his right eye.    He is using:   brimonidine (purple) twice a day right eye    cosopt (blue) two times per day in both eyes   latanoprost (teal) drops at bedtime in the right eye   diamox 500 mg 2 times per day (pill)   prednisolone (pink) drops four times per day in the right eye    ofloxacin (tan cap) 1 drop 4 times per day right eye    FREDI Leonard 9:43 AM  March 28, 2024

## 2024-03-28 NOTE — PROGRESS NOTES
Synposis per year    Chief Complaint/Presenting Concern: Glaucoma    History of Present Illness:   Power Blanco is a 38 year old patient who presents for evaluation of angle closure right eye. His initial presentation in March 2024 was concerning for sildenafil-related angle closure. He reports that in the past he has had similar small episodes of blurry vision and eye pain after using sildenafil. He initially responded to diamox and topical IOP medication. Subsequently an LPI was performed which was unsuccessful 3/26/24.    Today patient reports that while he has been very compliant with drops,and has taken the Diamox, headache no recurring.     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  Year diagnosis  Previous glaucoma surgery/laser   -3/20/24 unsuccessful LPI right eye, successful left eye   Maximum intraocular pressure 53/42  Current meds: see below  Family history: negative  CCT: At future visit  Gonio: right eye with 360 synechial closure, left eye with 270 appositional closure, 90 synechial  Refractive status: Reports no glasses wear as a child  Trauma history: positive - corneal abrasion right eye ~2022  Steroid exposure: negative  Vasospastic disease: Migrane/Raynaud phenomenon: negative  A past hemodynamic crisis or Low BP:: negative  Meds AEs/intolerance: None  PMHx: Positive for: None Negative for : Asthma and respiratory problems/Cardiac/Renal/Kidney stones/Sulfa Allergy  Anticoagulants: None  Prior testing:  B-scan right eye :  Retina flat and attached, normal disc on U/S, negative for tears or holes, negative for other masses, lesions or elevations.  Optos each eye: Lopez montano right eye   OCT mac: wnl each eye     Additional Ocular History: None    Plan/Recommendations:  Discussed findings with patient. Presents with chronic angle closure glaucoma right eye and primary angle closure  suspect left eye. There is likely a component of response to sildenafil.   Today IOP elevated right but improved on Diamox and headache resolved. Attempted LPI again right eye 03/26/24 - has new epithelial defect after the laser procedure and unclear if LPI patent after.  Schedule for trabeculectomy right eye with [facility]  Risks and benefits of trabeculectomy with [facility] right eye, including risks of bleeding, infection, need for additional surgery, failure of surgery, and vision loss were explained to patient, who expressed understanding and wishes to proceed with surgery   Corneal epithelial defect closed, will keep contact lens to heal the surface heal well for surgery next week.   Continue current medications:  Continue brimonidine (purple)  Continue cosopt (yellow) two times per day in both eyes  Continue latanoprost (teal) drops at bedtime in the right eye  Continue diamox 500 mg 2 times per day (pill)  Continue prednisolone drops four times per day in the right eye   Continue ofloxacin (tan cap) 1 drop 4 times per day right eye     Schedule primary trab right eye with [facility] under [place]    Thank you for entrusting us with your care  Karolina Bernardo MD, PGY3  Ophthalmology Resident  [facility]    Physician Attestation     Attending Physician Attestation:  Complete documentation of historical and exam elements from today's encounter can be found in the full encounter summary report (not reduplicated in this progress note). I personally obtained the chief complaint(s) and history of present illness. I confirmed and edited as necessary the review of systems, past medical/surgical history, family history, social history, and examination findings as documented by others; and I examined the patient myself. I personally reviewed the relevant tests, images, and reports as documented above. I formulated and edited as necessary the assessment and plan and discussed the findings and management plan with the patient and any  family members present at the time of the visit.  Milton Wolf M.D., Glaucoma, March 28, 2024

## 2024-03-28 NOTE — TELEPHONE ENCOUNTER
FUTURE VISIT INFORMATION        SURGERY INFORMATION:  Date: 4/3/24  [facility]  Surgeon:  Milton Wolf MD   Anesthesia Type:  General with block  Procedure: TRABECULECTOMY, USING MITOMYCIN C RIGHT   Consult: ov 3/26/24     RECORDS REQUESTED FROM:            Most recent EKG+ Tracin23- [facility]

## 2024-03-29 ENCOUNTER — TELEPHONE (OUTPATIENT)
Dept: OPHTHALMOLOGY | Facility: CLINIC | Age: 39
End: 2024-03-29

## 2024-03-29 NOTE — TELEPHONE ENCOUNTER
Spoke with Power regarding contact lens discomfort. He reports that he used artificial tears and that the contact lens is now very comfortable. He has no further concerns at this time.

## 2024-03-31 ENCOUNTER — ANESTHESIA (OUTPATIENT)
Dept: SURGERY | Facility: CLINIC | Age: 39
End: 2024-03-31

## 2024-03-31 ENCOUNTER — ANESTHESIA EVENT (OUTPATIENT)
Dept: SURGERY | Facility: CLINIC | Age: 39
End: 2024-03-31

## 2024-03-31 ENCOUNTER — HOSPITAL ENCOUNTER (OUTPATIENT)
Facility: CLINIC | Age: 39
Discharge: HOME | End: 2024-03-31
Attending: OPHTHALMOLOGY | Admitting: OPHTHALMOLOGY

## 2024-03-31 VITALS
HEART RATE: 43 BPM | OXYGEN SATURATION: 99 % | HEIGHT: 68 IN | DIASTOLIC BLOOD PRESSURE: 78 MMHG | BODY MASS INDEX: 33.95 KG/M2 | TEMPERATURE: 97.2 F | WEIGHT: 223.99 LBS | RESPIRATION RATE: 16 BRPM | SYSTOLIC BLOOD PRESSURE: 159 MMHG

## 2024-03-31 DIAGNOSIS — H40.211 ACUTE ANGLE-CLOSURE GLAUCOMA OF RIGHT EYE: Primary | ICD-10-CM

## 2024-03-31 PROCEDURE — 250N000011 HC RX IP 250 OP 636: Performed by: ANESTHESIOLOGY

## 2024-03-31 PROCEDURE — 66710 CILIARY TRANSSLERAL THERAPY: CPT | Mod: RT | Performed by: OPHTHALMOLOGY

## 2024-03-31 PROCEDURE — 250N000009 HC RX 250: Performed by: NURSE ANESTHETIST, CERTIFIED REGISTERED

## 2024-03-31 PROCEDURE — 710N000012 HC RECOVERY PHASE 2, PER MINUTE: Performed by: OPHTHALMOLOGY

## 2024-03-31 PROCEDURE — 250N000009 HC RX 250: Performed by: OPHTHALMOLOGY

## 2024-03-31 PROCEDURE — 258N000003 HC RX IP 258 OP 636: Performed by: NURSE ANESTHETIST, CERTIFIED REGISTERED

## 2024-03-31 PROCEDURE — 250N000011 HC RX IP 250 OP 636: Performed by: OPHTHALMOLOGY

## 2024-03-31 PROCEDURE — 360N000077 HC SURGERY LEVEL 4, PER MIN: Performed by: OPHTHALMOLOGY

## 2024-03-31 PROCEDURE — 66710 CILIARY TRANSSLERAL THERAPY: CPT | Performed by: NURSE ANESTHETIST, CERTIFIED REGISTERED

## 2024-03-31 PROCEDURE — 250N000011 HC RX IP 250 OP 636: Performed by: NURSE ANESTHETIST, CERTIFIED REGISTERED

## 2024-03-31 PROCEDURE — 370N000017 HC ANESTHESIA TECHNICAL FEE, PER MIN: Performed by: OPHTHALMOLOGY

## 2024-03-31 PROCEDURE — 710N000010 HC RECOVERY PHASE 1, LEVEL 2, PER MIN: Performed by: OPHTHALMOLOGY

## 2024-03-31 PROCEDURE — 272N000001 HC OR GENERAL SUPPLY STERILE: Performed by: OPHTHALMOLOGY

## 2024-03-31 PROCEDURE — 999N000141 HC STATISTIC PRE-PROCEDURE NURSING ASSESSMENT: Performed by: OPHTHALMOLOGY

## 2024-03-31 PROCEDURE — 250N000013 HC RX MED GY IP 250 OP 250 PS 637: Performed by: ANESTHESIOLOGY

## 2024-03-31 RX ORDER — NALOXONE HYDROCHLORIDE 0.4 MG/ML
0.1 INJECTION, SOLUTION INTRAMUSCULAR; INTRAVENOUS; SUBCUTANEOUS
Status: DISCONTINUED | OUTPATIENT
Start: 2024-03-31 | End: 2024-03-31 | Stop reason: HOSPADM

## 2024-03-31 RX ORDER — SODIUM CHLORIDE, SODIUM LACTATE, POTASSIUM CHLORIDE, CALCIUM CHLORIDE 600; 310; 30; 20 MG/100ML; MG/100ML; MG/100ML; MG/100ML
INJECTION, SOLUTION INTRAVENOUS CONTINUOUS
Status: DISCONTINUED | OUTPATIENT
Start: 2024-03-31 | End: 2024-03-31 | Stop reason: HOSPADM

## 2024-03-31 RX ORDER — ACETAMINOPHEN 325 MG/1
975 TABLET ORAL ONCE
Status: COMPLETED | OUTPATIENT
Start: 2024-03-31 | End: 2024-03-31

## 2024-03-31 RX ORDER — HYDROMORPHONE HYDROCHLORIDE 1 MG/ML
0.2 INJECTION, SOLUTION INTRAMUSCULAR; INTRAVENOUS; SUBCUTANEOUS EVERY 5 MIN PRN
Status: DISCONTINUED | OUTPATIENT
Start: 2024-03-31 | End: 2024-03-31 | Stop reason: HOSPADM

## 2024-03-31 RX ORDER — ONDANSETRON 2 MG/ML
4 INJECTION INTRAMUSCULAR; INTRAVENOUS EVERY 30 MIN PRN
Status: DISCONTINUED | OUTPATIENT
Start: 2024-03-31 | End: 2024-03-31 | Stop reason: HOSPADM

## 2024-03-31 RX ORDER — PROPOFOL 10 MG/ML
INJECTION, EMULSION INTRAVENOUS CONTINUOUS PRN
Status: DISCONTINUED | OUTPATIENT
Start: 2024-03-31 | End: 2024-03-31

## 2024-03-31 RX ORDER — LIDOCAINE HYDROCHLORIDE 20 MG/ML
INJECTION, SOLUTION INFILTRATION; PERINEURAL PRN
Status: DISCONTINUED | OUTPATIENT
Start: 2024-03-31 | End: 2024-03-31

## 2024-03-31 RX ORDER — PROPOFOL 10 MG/ML
INJECTION, EMULSION INTRAVENOUS PRN
Status: DISCONTINUED | OUTPATIENT
Start: 2024-03-31 | End: 2024-03-31

## 2024-03-31 RX ORDER — FENTANYL CITRATE 50 UG/ML
25 INJECTION, SOLUTION INTRAMUSCULAR; INTRAVENOUS
Status: DISCONTINUED | OUTPATIENT
Start: 2024-03-31 | End: 2024-03-31 | Stop reason: HOSPADM

## 2024-03-31 RX ORDER — FENTANYL CITRATE 50 UG/ML
50 INJECTION, SOLUTION INTRAMUSCULAR; INTRAVENOUS EVERY 5 MIN PRN
Status: DISCONTINUED | OUTPATIENT
Start: 2024-03-31 | End: 2024-03-31 | Stop reason: HOSPADM

## 2024-03-31 RX ORDER — OXYCODONE HYDROCHLORIDE 5 MG/1
5 TABLET ORAL
Status: COMPLETED | OUTPATIENT
Start: 2024-03-31 | End: 2024-03-31

## 2024-03-31 RX ORDER — HYDROMORPHONE HYDROCHLORIDE 1 MG/ML
0.4 INJECTION, SOLUTION INTRAMUSCULAR; INTRAVENOUS; SUBCUTANEOUS EVERY 5 MIN PRN
Status: DISCONTINUED | OUTPATIENT
Start: 2024-03-31 | End: 2024-03-31 | Stop reason: HOSPADM

## 2024-03-31 RX ORDER — BALANCED SALT SOLUTION 6.4; .75; .48; .3; 3.9; 1.7 MG/ML; MG/ML; MG/ML; MG/ML; MG/ML; MG/ML
SOLUTION OPHTHALMIC PRN
Status: DISCONTINUED | OUTPATIENT
Start: 2024-03-31 | End: 2024-03-31 | Stop reason: HOSPADM

## 2024-03-31 RX ORDER — DEXAMETHASONE SODIUM PHOSPHATE 10 MG/ML
INJECTION INTRAMUSCULAR; INTRAVENOUS PRN
Status: DISCONTINUED | OUTPATIENT
Start: 2024-03-31 | End: 2024-03-31 | Stop reason: HOSPADM

## 2024-03-31 RX ORDER — FENTANYL CITRATE 50 UG/ML
INJECTION, SOLUTION INTRAMUSCULAR; INTRAVENOUS PRN
Status: DISCONTINUED | OUTPATIENT
Start: 2024-03-31 | End: 2024-03-31

## 2024-03-31 RX ORDER — FENTANYL CITRATE 50 UG/ML
25 INJECTION, SOLUTION INTRAMUSCULAR; INTRAVENOUS EVERY 5 MIN PRN
Status: DISCONTINUED | OUTPATIENT
Start: 2024-03-31 | End: 2024-03-31 | Stop reason: HOSPADM

## 2024-03-31 RX ORDER — OXYCODONE HYDROCHLORIDE 5 MG/1
5 TABLET ORAL EVERY 6 HOURS PRN
Qty: 4 TABLET | Refills: 0 | Status: SHIPPED | OUTPATIENT
Start: 2024-03-31 | End: 2024-03-31

## 2024-03-31 RX ORDER — DEXAMETHASONE SODIUM PHOSPHATE 4 MG/ML
4 INJECTION, SOLUTION INTRA-ARTICULAR; INTRALESIONAL; INTRAMUSCULAR; INTRAVENOUS; SOFT TISSUE
Status: DISCONTINUED | OUTPATIENT
Start: 2024-03-31 | End: 2024-03-31 | Stop reason: HOSPADM

## 2024-03-31 RX ORDER — OXYCODONE HYDROCHLORIDE 5 MG/1
5 TABLET ORAL EVERY 6 HOURS PRN
Status: DISCONTINUED | OUTPATIENT
Start: 2024-03-31 | End: 2024-03-31 | Stop reason: HOSPADM

## 2024-03-31 RX ORDER — SODIUM CHLORIDE, SODIUM LACTATE, POTASSIUM CHLORIDE, CALCIUM CHLORIDE 600; 310; 30; 20 MG/100ML; MG/100ML; MG/100ML; MG/100ML
INJECTION, SOLUTION INTRAVENOUS CONTINUOUS PRN
Status: DISCONTINUED | OUTPATIENT
Start: 2024-03-31 | End: 2024-03-31

## 2024-03-31 RX ORDER — ONDANSETRON 4 MG/1
4 TABLET, ORALLY DISINTEGRATING ORAL EVERY 30 MIN PRN
Status: DISCONTINUED | OUTPATIENT
Start: 2024-03-31 | End: 2024-03-31 | Stop reason: HOSPADM

## 2024-03-31 RX ORDER — LIDOCAINE 40 MG/G
CREAM TOPICAL
Status: DISCONTINUED | OUTPATIENT
Start: 2024-03-31 | End: 2024-03-31 | Stop reason: HOSPADM

## 2024-03-31 RX ORDER — OXYCODONE HYDROCHLORIDE 5 MG/1
5 TABLET ORAL EVERY 6 HOURS PRN
Qty: 4 TABLET | Refills: 0 | Status: SHIPPED | OUTPATIENT
Start: 2024-03-31

## 2024-03-31 RX ORDER — OXYCODONE HYDROCHLORIDE 10 MG/1
10 TABLET ORAL
Status: DISCONTINUED | OUTPATIENT
Start: 2024-03-31 | End: 2024-03-31 | Stop reason: HOSPADM

## 2024-03-31 RX ADMIN — ACETAMINOPHEN 975 MG: 325 TABLET, FILM COATED ORAL at 14:33

## 2024-03-31 RX ADMIN — PROPOFOL 50 MG: 10 INJECTION, EMULSION INTRAVENOUS at 15:17

## 2024-03-31 RX ADMIN — HYDROMORPHONE HYDROCHLORIDE 0.2 MG: 1 INJECTION, SOLUTION INTRAMUSCULAR; INTRAVENOUS; SUBCUTANEOUS at 15:47

## 2024-03-31 RX ADMIN — LIDOCAINE HYDROCHLORIDE 40 MG: 20 INJECTION, SOLUTION INFILTRATION; PERINEURAL at 15:17

## 2024-03-31 RX ADMIN — FENTANYL CITRATE 50 MCG: 50 INJECTION INTRAMUSCULAR; INTRAVENOUS at 15:27

## 2024-03-31 RX ADMIN — HYDROMORPHONE HYDROCHLORIDE 0.2 MG: 1 INJECTION, SOLUTION INTRAMUSCULAR; INTRAVENOUS; SUBCUTANEOUS at 15:52

## 2024-03-31 RX ADMIN — SODIUM CHLORIDE, POTASSIUM CHLORIDE, SODIUM LACTATE AND CALCIUM CHLORIDE: 600; 310; 30; 20 INJECTION, SOLUTION INTRAVENOUS at 15:07

## 2024-03-31 RX ADMIN — MIDAZOLAM 2 MG: 1 INJECTION INTRAMUSCULAR; INTRAVENOUS at 15:07

## 2024-03-31 RX ADMIN — HYDROMORPHONE HYDROCHLORIDE 0.4 MG: 1 INJECTION, SOLUTION INTRAMUSCULAR; INTRAVENOUS; SUBCUTANEOUS at 15:59

## 2024-03-31 RX ADMIN — ONDANSETRON 4 MG: 2 INJECTION INTRAMUSCULAR; INTRAVENOUS at 16:37

## 2024-03-31 RX ADMIN — PROPOFOL 100 MCG/KG/MIN: 10 INJECTION, EMULSION INTRAVENOUS at 15:17

## 2024-03-31 RX ADMIN — OXYCODONE HYDROCHLORIDE 5 MG: 5 TABLET ORAL at 16:19

## 2024-03-31 RX ADMIN — PROPOFOL 30 MG: 10 INJECTION, EMULSION INTRAVENOUS at 15:21

## 2024-03-31 RX ADMIN — DEXMEDETOMIDINE HYDROCHLORIDE 20 MCG: 100 INJECTION, SOLUTION INTRAVENOUS at 15:17

## 2024-03-31 RX ADMIN — FENTANYL CITRATE 25 MCG: 50 INJECTION INTRAMUSCULAR; INTRAVENOUS at 15:17

## 2024-03-31 RX ADMIN — PROPOFOL 30 MG: 10 INJECTION, EMULSION INTRAVENOUS at 15:26

## 2024-03-31 RX ADMIN — FENTANYL CITRATE 25 MCG: 50 INJECTION INTRAMUSCULAR; INTRAVENOUS at 15:22

## 2024-03-31 ASSESSMENT — ACTIVITIES OF DAILY LIVING (ADL)
ADLS_ACUITY_SCORE: 35

## 2024-03-31 ASSESSMENT — LIFESTYLE VARIABLES: TOBACCO_USE: 1

## 2024-03-31 NOTE — ANESTHESIA CARE TRANSFER NOTE
Patient: Power Blanco    Procedure: Procedure(s):  Cyclophotocoagulation       Diagnosis: Acute angle closure [H40.219]  Diagnosis Additional Information: No value filed.    Anesthesia Type:   MAC     Note:    Oropharynx: oropharynx clear of all foreign objects  Level of Consciousness: drowsy  Oxygen Supplementation: room air    Independent Airway: airway patency satisfactory and stable  Dentition: dentition unchanged  Vital Signs Stable: post-procedure vital signs reviewed and stable  Report to RN Given: handoff report given  Patient transferred to: PACU    Handoff Report: Identifed the Patient, Identified the Reponsible Provider, Reviewed the pertinent medical history, Discussed the surgical course, Reviewed Intra-OP anesthesia mangement and issues during anesthesia, Set expectations for post-procedure period and Allowed opportunity for questions and acknowledgement of understanding      Vitals:  Vitals Value Taken Time   /78 03/31/24 1536   Temp 36.2  C (97.2  F) 03/31/24 1536   Pulse 53 03/31/24 1536   Resp 16 03/31/24 1536   SpO2 99 % 03/31/24 1538   Vitals shown include unfiled device data.    Electronically Signed By: JOYCELYN Maldonado CRNA  March 31, 2024  3:39 PM

## 2024-03-31 NOTE — ANESTHESIA POSTPROCEDURE EVALUATION
Patient: Power Blanco    Procedure: Procedure(s):  Cyclophotocoagulation       Anesthesia Type:  MAC    Note:  Disposition: Outpatient   Postop Pain Control: Uneventful            Sign Out: Well controlled pain   PONV: No   Neuro/Psych: Uneventful            Sign Out: Acceptable/Baseline neuro status   Airway/Respiratory: Uneventful            Sign Out: Acceptable/Baseline resp. status   CV/Hemodynamics: Uneventful            Sign Out: Acceptable CV status; No obvious hypovolemia; No obvious fluid overload   Other NRE: NONE   DID A NON-ROUTINE EVENT OCCUR?            Last vitals:  Vitals Value Taken Time   /84 03/31/24 1600   Temp 36.2  C (97.2  F) 03/31/24 1536   Pulse 56 03/31/24 1600   Resp 16 03/31/24 1536   SpO2 100 % 03/31/24 1600       Electronically Signed By: Aramis Rowe MD  March 31, 2024  4:16 PM

## 2024-03-31 NOTE — BRIEF OP NOTE
[facility]    Brief Operative Note    Pre-operative diagnosis: Acute angle closure [H40.219]  Post-operative diagnosis Same as pre-operative diagnosis    Procedure: Cyclophotocoagulation, Right - Eye    Surgeon: Surgeon(s) and Role:     * Milton Wolf MD - Primary     * Polina Lo MD - Resident - Assisting  Anesthesia: General   Estimated Blood Loss: None    Drains: None  Specimens: * No specimens in log *  Findings:   None.  Complications: None.  Implants: * No implants in log *

## 2024-03-31 NOTE — ANESTHESIA PREPROCEDURE EVALUATION
Anesthesia Pre-Procedure Evaluation    Patient: Power Blanco   MRN: [id number] : 1985        Procedure : Procedure(s):  Cyclophotocoagulation          Past Medical History:   Diagnosis Date    Acute angle-closure glaucoma of right eye     Diabetes mellitus, type 2 (H)     Glaucoma (increased eye pressure)       Past Surgical History:   Procedure Laterality Date    CHOLECYSTECTOMY  2022      No Known Allergies   Social History     Tobacco Use    Smoking status: Every Day     Types: Cigarettes, Cigars     Passive exposure: Never    Smokeless tobacco: Never   Substance Use Topics    Alcohol use: Not Currently      Wt Readings from Last 1 Encounters:   24 101.6 kg (224 lb)        Anesthesia Evaluation            ROS/MED HX  ENT/Pulmonary: Comment: Glaucoma    (+)                tobacco use,                        Neurologic:  - neg neurologic ROS     Cardiovascular:  - neg cardiovascular ROS     METS/Exercise Tolerance:     Hematologic:  - neg hematologic  ROS     Musculoskeletal:  - neg musculoskeletal ROS     GI/Hepatic:  - neg GI/hepatic ROS     Renal/Genitourinary:  - neg Renal ROS     Endo:  - neg endo ROS     Psychiatric/Substance Use:  - neg psychiatric ROS     Infectious Disease:  - neg infectious disease ROS     Malignancy:       Other:            Physical Exam    Airway  airway exam normal      Mallampati: II       Respiratory Devices and Support         Dental       (+) Minor Abnormalities - some fillings, tiny chips      Cardiovascular   cardiovascular exam normal          Pulmonary   pulmonary exam normal                OUTSIDE LABS:  CBC:   Lab Results   Component Value Date    WBC 6.7 2024    HGB 16.8 2024    HCT 49.0 2024     2024     BMP:   Lab Results   Component Value Date     2024    POTASSIUM 4.7 2024    CHLORIDE 108 (H) 2024    CO2 19 (L) 2024    BUN 14.8 2024    CR 0.85 2024     (H) 2024  "    COAGS: No results found for: \"PTT\", \"INR\", \"FIBR\"  POC: No results found for: \"BGM\", \"HCG\", \"HCGS\"  HEPATIC: No results found for: \"ALBUMIN\", \"PROTTOTAL\", \"ALT\", \"AST\", \"GGT\", \"ALKPHOS\", \"BILITOTAL\", \"BILIDIRECT\", \"YONY\"  OTHER:   Lab Results   Component Value Date    NIKI 9.2 03/22/2024       Anesthesia Plan    ASA Status:  2    NPO Status:  NPO Appropriate    Anesthesia Type: MAC.     - Reason for MAC: straight local not clinically adequate   Induction: Intravenous, Propofol.   Maintenance: Balanced.        Consents    Anesthesia Plan(s) and associated risks, benefits, and realistic alternatives discussed. Questions answered and patient/representative(s) expressed understanding.     - Discussed: Risks, Benefits and Alternatives for the PROCEDURE were discussed     - Discussed with:  Patient            Postoperative Care    Pain management: IV analgesics, Oral pain medications, Multi-modal analgesia.   PONV prophylaxis: Ondansetron (or other 5HT-3), Dexamethasone or Solumedrol     Comments:               Aramis Rowe MD    I have reviewed the pertinent notes and labs in the chart from the past 30 days and (re)examined the patient.  Any updates or changes from those notes are reflected in this note.              # Obesity: Estimated body mass index is 34.06 kg/m  as calculated from the following:    Height as of 3/28/24: 1.727 m (5' 8\").    Weight as of 3/28/24: 101.6 kg (224 lb).      "

## 2024-03-31 NOTE — DISCHARGE INSTRUCTIONS
Discharge Instructions Following Glaucoma Surgery    Date: 3/31/2024    EYE MEDICATIONS:  You should start drops immediately when arrive home.     Continue brimonidine (purple)  Continue cosopt (yellow) two times per day in both eyes  Continue latanoprost (teal) drops at bedtime in the right eye  Take Diamox 500 mg tonight but do not take tomorrow morning before your appointment  Increase prednisolone drops to every 1-2 hours in the right eye  Stop ofloxacin     For 5 days: Wear your glasses or leave the eye uncovered while awake.    Wear the eye shield every night and during daytime naps.  DO NOT use padding under eye shield.    You may notice blurred or double vision initially, this will gradually clear.     If you experience any severe pain, sudden decrease in vision, or discharge for the eye, contact your doctor immediately.     Minor itching, scratching, burning etc. is normal. Use regular or extra-strength Tylenol for discomfort.    Refrain from heavy lifting, excessive bending over, and heavy exertion for 1 week.    You may bathe or shower but do not get the eye wet.    Do not rub or push on the operative eye for 1 week.  You may wipe the lids gently with a warm wet cloth to remove matter from eyelashes.    Continue with your usual diet and medications prescribed by your doctor.    Sunglasses will increase your comfort post-operatively.    Post Operative Visit on 4/1/24 at 10 AM at [facility] 9th floor  Bring all material and medications to the office on the first post-op day.  Call your surgeon at [phone] or the answering service for any problems, especially pain.        Same-Day Surgery   Adult Discharge Orders & Instructions     For 24 hours after surgery:  Get plenty of rest.  A responsible adult must stay with you for at least 24 hours after you leave the hospital.   Pain medication can slow your reflexes. Do not drive or use heavy equipment.  If you have weakness or  tingling, don't drive or use heavy equipment until this feeling goes away.  Mixing alcohol and pain medication can cause dizziness and slow your breathing. It can even be fatal. Do not drink alcohol while taking pain medication.  Avoid strenuous or risky activities.  Ask for help when climbing stairs.   You may feel lightheaded.  If so, sit for a few minutes before standing.  Have someone help you get up.   If you have nausea (feel sick to your stomach), drink only clear liquids such as apple juice, ginger ale, broth or 7-Up.  Rest may also help.  Be sure to drink enough fluids.  Move to a regular diet as you feel able. Take pain medications with a small amount of solid food, such as toast or crackers, to avoid nausea.   A slight fever is normal. Call the doctor if your fever is over 100 F (37.7 C) (taken under the tongue) or lasts longer than 24 hours.  You may have a dry mouth, muscle aches, trouble sleeping or a sore throat.  These symptoms should go away after 24 hours.  Do not make important or legal decisions.   Pain Management:      1. Take pain medication (if prescribed) for pain as directed by your physician.        2. WARNING: If the pain medication you have been prescribed contains Tylenol  (acetaminophen), DO NOT take additional doses of Tylenol (acetaminophen).     Call your doctor for any of the followin.  Signs of infection (fever, growing tenderness at the surgery site, severe pain, a large amount of drainage or bleeding, foul-smelling drainage, redness, swelling).    2.  It has been over 8 to 10 hours since surgery and you are still not able to urinate (pee).    3.  Headache for over 24 hours.    4.  Numbness, tingling or weakness the day after surgery (if you had spinal anesthesia).  To contact a doctor, call Dr. Wolf, [facility] clinic, [phone] or:  ' [phone] and ask for the Resident On Call for:          Ophthalmology (answered 24 hours a day)  '   Emergency  Department:  [place] [facility]: [phone]  [place] [facility]: [phone]               Rev. 10/2014

## 2024-04-01 ENCOUNTER — OFFICE VISIT (OUTPATIENT)
Dept: OPHTHALMOLOGY | Facility: CLINIC | Age: 39
End: 2024-04-01
Attending: OPHTHALMOLOGY

## 2024-04-01 DIAGNOSIS — H40.211 ACUTE ANGLE-CLOSURE GLAUCOMA OF RIGHT EYE: ICD-10-CM

## 2024-04-01 PROCEDURE — 99024 POSTOP FOLLOW-UP VISIT: CPT | Mod: 4UV | Performed by: OPHTHALMOLOGY

## 2024-04-01 RX ORDER — ACETAZOLAMIDE 500 MG/1
500 CAPSULE, EXTENDED RELEASE ORAL DAILY
Qty: 20 CAPSULE | Refills: 1 | Status: SHIPPED | OUTPATIENT
Start: 2024-04-01 | End: 2024-04-16

## 2024-04-01 ASSESSMENT — EXTERNAL EXAM - RIGHT EYE: OD_EXAM: NORMAL

## 2024-04-01 ASSESSMENT — VISUAL ACUITY
METHOD: SNELLEN - LINEAR
OD_PH_SC+: +1
OD_SC: 20/125
OS_PH_SC: 20/25
OD_PH_SC: 20/60
OS_PH_SC+: -2

## 2024-04-01 ASSESSMENT — TONOMETRY
IOP_METHOD: APPLANATION
OS_IOP_MMHG: 21
OD_IOP_MMHG: 17

## 2024-04-01 ASSESSMENT — EXTERNAL EXAM - LEFT EYE: OS_EXAM: NORMAL

## 2024-04-01 ASSESSMENT — SLIT LAMP EXAM - LIDS: COMMENTS: NORMAL

## 2024-04-01 NOTE — PATIENT INSTRUCTIONS
Continue brimonidine (purple)  Continue cosopt (yellow) two times per day in both eyes  Continue latanoprost (teal) drops at bedtime in the right eye  Continue diamox 500 mg once per day  Continue prednisolone drops six times per day in the right eye   Stop ofloxacin

## 2024-04-01 NOTE — PROGRESS NOTES
Synposis per year    Chief Complaint/Presenting Concern: Glaucoma    History of Present Illness:   Power Blanco is a 38 year old patient who presents for evaluation of angle closure right eye. His initial presentation in March 2024 was concerning for sildenafil-related angle closure. He reports that in the past he has had similar small episodes of blurry vision and eye pain after using sildenafil. He initially responded to diamox and topical IOP medication. Subsequently an LPI was performed which was unsuccessful 3/26/24.    3/31/24 his IOP spiked and he underwent CPC right eye. He now subjectively feels very improved.     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  Year diagnosis  Previous glaucoma surgery/laser   -3/20/24 unsuccessful LPI right eye, successful left eye    -CPC right eye 3/31/24   Maximum intraocular pressure 53/42  Current meds: see below  Family history: negative  CCT: At future visit  Gonio: right eye with 360 synechial closure, left eye with 270 appositional closure, 90 synechial  Refractive status: Reports no glasses wear as a child  Trauma history: positive - corneal abrasion right eye ~2022  Steroid exposure: negative  Vasospastic disease: Migrane/Raynaud phenomenon: negative  A past hemodynamic crisis or Low BP:: negative  Meds AEs/intolerance: None  PMHx: Positive for: None Negative for : Asthma and respiratory problems/Cardiac/Renal/Kidney stones/Sulfa Allergy  Anticoagulants: None  Prior testing:  B-scan right eye :  Retina flat and attached, normal disc on U/S, negative for tears or holes, negative for other masses, lesions or elevations.  Optos each eye: Lopez montano right eye   OCT mac: wnl each eye     Additional Ocular History: None    Plan/Recommendations:  Discussed findings with patient. Presents with chronic angle closure glaucoma right eye and primary angle  closure suspect left eye. There is likely a component of response to sildenafil. IOP was refractory to LPI and medical management  S/p TSCPC 3/31/24, doing well today  Continue brimonidine (purple)  Continue cosopt (yellow) two times per day in both eyes  Continue latanoprost (teal) drops at bedtime in the right eye  Continue diamox 500 mg once per day  Continue prednisolone drops six times per day in the right eye   Stop ofloxacin (tan cap) 1 drop 4 times per day right eye     Return next Tuesday and Friday - stop diamox on Tuesday and recheck pressure on Friday    Thank you for entrusting us with your care  Karolina Bernardo MD, PGY3  Ophthalmology Resident  [facility]    Patient discussed with Dr. Wolf

## 2024-04-01 NOTE — LETTER
April 1, 2024      Power Blanco  [address]  [place] [address]        To Whom It May Concern:    Power Blanco was seen in our clinic. He may return to work 4/3/24 for light duty - will not likely tolerate more than 4 hours of work per day. 4/15 he is scheduled to undergo surgery and following will need two weeks before returning to work.      Sincerely,      Crissy Bernardo MD

## 2024-04-02 ENCOUNTER — PRE VISIT (OUTPATIENT)
Dept: SURGERY | Facility: CLINIC | Age: 39
End: 2024-04-02

## 2024-04-02 ENCOUNTER — TELEPHONE (OUTPATIENT)
Dept: OPHTHALMOLOGY | Facility: CLINIC | Age: 39
End: 2024-04-02

## 2024-04-02 NOTE — TELEPHONE ENCOUNTER
Called patient regarding cancelled surgery with Dr. Wolf for 4/3.    This writer apologized to patient for surgery scheduling being unaware that surgery on 4/3 was supposed to be cancelled per the appointment he had on 4/2 with Dr. Bernardo. Confirmed with patient that surgery scheduling spoke with Dr. Wolf and confirmed that surgery for tomorrow has been cancelled.     Explained to patient we would call back in the morning to reschedule his procedure. Patient understood and was appreciative of the call.     He has no further questions or concerns at this time.    Sandra Hannon on 4/2/2024 at 4:13 PM

## 2024-04-02 NOTE — TELEPHONE ENCOUNTER
Reached out to patient as he was a no show to PAC for 4/3 surgery.   Patient stated at his appointment yesterday he was told that tomorrows surgery would be cx and that he was supposed to attend his follow up appointments on 4/9 and 4/12 and that tomorrows surgery would be moved to 4/15. NO COMMUNICATION was ever provided to the surgical schedulers regarding this.  Please advise schedulers of the plan.     Anna C. Schoenecker on 4/2/2024 at 3:33 PM

## 2024-04-03 ENCOUNTER — PRE VISIT (OUTPATIENT)
Dept: SURGERY | Facility: CLINIC | Age: 39
End: 2024-04-03

## 2024-04-03 NOTE — TELEPHONE ENCOUNTER
Called patient to schedule surgery with Dr. Wolf    Date of Surgery: 4/15    Location of surgery: [facility]    Pre-Op H&P: PAC 4/3.    Pre/Post Imaging:  No    Post-Op Appt Date: 4/16,4/23,5/14    Surgery Packet Mailed: yes    Additional comments: Spoke with patient to confirm 4/15 surgical date, set up follow up appointments and confirmed with patient. He will await call from RN and reach out with any questions         Anna C. Schoenecker on 4/3/2024 at 8:49 AM

## 2024-04-03 NOTE — TELEPHONE ENCOUNTER
FUTURE VISIT INFORMATION        SURGERY INFORMATION:  Date: 4/3/24  [facility]  Surgeon:  Milton Wolf MD   Anesthesia Type:  General with block  Procedure: TRABECULECTOMY, USING MITOMYCIN C RIGHT   Consult: ov 3/26/24     RECORDS REQUESTED FROM:            Most recent EKG+ Tracin23- [facility]

## 2024-04-03 NOTE — OP NOTE
"Power Blanco  [id number]  3/31/2024    PREOPERATIVE DIAGNOSIS: Chronic angle closure glaucoma right eye, indeterminate stage     POSTOPERATIVE DIAGNOSIS: Same as pre-operative diagnosis    OPERATION PERFORMED: Procedure(s):  right eye, Transscleral Cyclophotocoagulation with GPROBE    SURGEON:  iMlton Wolf MD- Primary Surgeon   Crissy Bernardo MD- Assisting Resident     ANESTHESIA: MAC    COMPLICATIONS:  none    FINDINGS:  Anatomy as expected    ESTIMATED BLOOD LOSS:  none    SPECIMENS:  * No specimens in log *    IMPLANTS: None     The patient Power Blanco was met in the operating room, correct patient, correct eye, correct procedure were verified in a time out. Monitored anesthesia care was initiated.     The operative eye was prepped and draped in the usual sterile fashion. After a time out procedure, a speculum was inserted to the operative eye aand the laser was used with the following settings: 4 seconds, [id number] mW, 28 applications over 360 degrees. The patient tolerated the procedure well. he received a subconjunctival injection of decadron, maxitrol ointment, left for recovery in stable condition.    \"Power Blanco\" will be seen in clinic tomorrow as scheduled.      "

## 2024-04-05 ENCOUNTER — PRE VISIT (OUTPATIENT)
Dept: SURGERY | Facility: CLINIC | Age: 39
End: 2024-04-05

## 2024-04-05 NOTE — TELEPHONE ENCOUNTER
FUTURE VISIT INFORMATION        SURGERY INFORMATION:  Date: 4/15/24  [facility]  Surgeon:  Milton Wolf MD   Anesthesia Type:  General with block  Procedure: TRABECULECTOMY, USING MITOMYCIN C RIGHT   Consult: ov 3/26/24     RECORDS REQUESTED FROM:            Most recent EKG+ Tracin23- [facility]

## 2024-04-06 ENCOUNTER — HOSPITAL ENCOUNTER (EMERGENCY)
Facility: CLINIC | Age: 39
Discharge: HOME | End: 2024-04-06
Attending: EMERGENCY MEDICINE | Admitting: EMERGENCY MEDICINE

## 2024-04-06 ENCOUNTER — TELEPHONE (OUTPATIENT)
Dept: OPHTHALMOLOGY | Facility: CLINIC | Age: 39
End: 2024-04-06

## 2024-04-06 VITALS
HEART RATE: 58 BPM | WEIGHT: 220 LBS | SYSTOLIC BLOOD PRESSURE: 152 MMHG | OXYGEN SATURATION: 100 % | HEIGHT: 68 IN | DIASTOLIC BLOOD PRESSURE: 82 MMHG | RESPIRATION RATE: 16 BRPM | TEMPERATURE: 99 F | BODY MASS INDEX: 33.34 KG/M2

## 2024-04-06 DIAGNOSIS — Z01.00 ENCOUNTER FOR EYE EXAM: ICD-10-CM

## 2024-04-06 PROCEDURE — 99283 EMERGENCY DEPT VISIT LOW MDM: CPT

## 2024-04-06 PROCEDURE — 250N000009 HC RX 250: Performed by: EMERGENCY MEDICINE

## 2024-04-06 RX ORDER — TETRACAINE HYDROCHLORIDE 5 MG/ML
1-2 SOLUTION OPHTHALMIC ONCE
Status: COMPLETED | OUTPATIENT
Start: 2024-04-06 | End: 2024-04-06

## 2024-04-06 RX ADMIN — FLUORESCEIN SODIUM 1 STRIP: 1 STRIP OPHTHALMIC at 11:14

## 2024-04-06 RX ADMIN — TETRACAINE HYDROCHLORIDE 2 DROP: 5 SOLUTION OPHTHALMIC at 11:14

## 2024-04-06 ASSESSMENT — COLUMBIA-SUICIDE SEVERITY RATING SCALE - C-SSRS
6. HAVE YOU EVER DONE ANYTHING, STARTED TO DO ANYTHING, OR PREPARED TO DO ANYTHING TO END YOUR LIFE?: NO
1. IN THE PAST MONTH, HAVE YOU WISHED YOU WERE DEAD OR WISHED YOU COULD GO TO SLEEP AND NOT WAKE UP?: NO
2. HAVE YOU ACTUALLY HAD ANY THOUGHTS OF KILLING YOURSELF IN THE PAST MONTH?: NO

## 2024-04-06 NOTE — DISCHARGE INSTRUCTIONS
Continue taking all of your medication related to acute angle-closure glaucoma as previously prescribed.  Follow-up as planned with ophthalmology next week.    If you develop worsening eye pain, headache, vision changes, redness, drainage, or any new or worsening concerns related to the eye you should go be evaluated at the [facility] ER as they are the ones performing the surgery and have ophthalmology available to see you there.

## 2024-04-06 NOTE — TELEPHONE ENCOUNTER
"Telephone Note    I was contacted by Dr. Masters from [facility] ED regarding this patient on 04/06/24. The following information was obtained via phone call with this provider and by chart check.      Patient is a 38-year-old male who presented for an \"eye pressure check.\" He reportedly spoke with a provider from the eye clinic about mild intermittent pain the last couple of days and was instructed to go in for an IOP check. He presented to [facility] ED \"to get my pressure checked.\" He is asymptomatic at this time without pain, worsening redness, or decreasing vision.     Patient's past ocular history:   Angle closure glaucoma right eye:   His initial presentation in March 2024 was concerning for sildenafil-related angle closure. He reported that in the past he has had similar small episodes of blurry vision and eye pain after using sildenafil. He initially responded to diamox and topical IOP medication. Subsequently an LPI was performed which was unsuccessful 3/26/24. 3/31/24 his IOP spiked and he underwent CPC right eye. He now subjectively feels very improved. He has follow up scheduled on 4/9/23 and surgery scheduled 4/15/23 with Dr. Wolf.   He is taking all of his prescribed drops and medications without issue.     Outside provider's exam revealed:   - Visual acuity baseline  - Pupillary exam: fixed and mid dilated right pupil (also noted on 4/1/24 eye clinic visit, stable) right eye, normal left eye   - Intraocular pressures are 10/11  - Anterior slit lamp exam showed no signs of epi defect or narrowed chamber.      I conveyed to the consulting physician that I could provide some general thoughts regarding this patient, as he is an established patient with our team. Given the reported examination findings, I agreed with Dr. Masters that if asymptomatic, he can follow up as scheduled as opposed to requiring transfer to [facility] or [facility] emergency departments. I conveyed to the provider that if " there was any concern about the patient's care or my suggestions, the patient should be sent to the ED for evaluation right away.      Following communication with the patient, the provider and the patient selected to follow up as scheduled outpatient.     Message sent to staff for review.     Boone Mercedes MD  Resident Physician, PGY-2  Department of Ophthalmology

## 2024-04-06 NOTE — ED TRIAGE NOTES
"  Pt who sees Joe Rose for glaucoma treatment at the [facility] presents with increased eye pain and pressure.  Sent by opthalmology for pressure check due to increased symptoms over the past few days.  Scheduled for \"glaucoma surgery\" on 4/15.  C/O right frontal headache.     Triage Assessment (Adult)       Row Name 04/06/24 1102          Triage Assessment    Airway WDL WDL        Respiratory WDL    Respiratory WDL WDL        Skin Circulation/Temperature WDL    Skin Circulation/Temperature WDL WDL        Cardiac WDL    Cardiac WDL WDL        Peripheral/Neurovascular WDL    Peripheral Neurovascular WDL WDL        Cognitive/Neuro/Behavioral WDL    Cognitive/Neuro/Behavioral WDL WDL                     "

## 2024-04-06 NOTE — ED PROVIDER NOTES
"EMERGENCY DEPARTMENT ENCOUNTER      NAME: Power Blanco  AGE: 38 year old male  YOB: 1985  MRN: [id number]  EVALUATION DATE & TIME: No admission date for patient encounter.    PCP: No Ref-Primary, Physician    ED PROVIDER: Anuradha Masters MD      Chief Complaint   Patient presents with    Eye Problem         FINAL IMPRESSION:  1. Encounter for eye exam          ED COURSE & MEDICAL DECISION MAKING:    Pertinent Labs & Imaging studies reviewed. (See chart for details)    11:15 AM I introduced myself to the patient, obtained patient history, performed a physical and Woods lamp exam, ocular pressures checked, and discussed plan for ED workup including potential diagnostic laboratory/imaging studies and interventions.  11:49 PM I paged for [facility].  12:05 PM I spoke with Dr. Mercedes, [facility].   12:20 PM I updated the patient on Ophthalmology recommendations. We discussed the plan for discharge and the patient is agreeable. We reviewed supportive cares, symptomatic treatment, outpatient follow up, and reasons to return to the Emergency Department.      38 year old male with recently diagnosed acute angle closure glaucoma s/p multiple surgeries following with [facility] ophthalmology who presents to the Emergency Department for evaluation of IOP check.  Verifies that he actually had developed a brief right frontal headache 2 days ago that lasted for about half hour.  He has been instructed to call his ophthalmologist at the [facility] if this occurred so he contacted them and they advised him to be seen to have his eye pressure checked.  He states that this resolved so he did not do that at that time.  He had a difficult time getting a hold of them reportedly yesterday to try to go into the clinic and eventually talk to someone today who then told him he should be seen in the ER.  He came to [facility] as this is closer to his home than the [place] and he states that \"I did not " "feel that this was an emergency\" as his symptoms had resolved.  Currently he reports being at his baseline and denies any new vision changes, eye pain, headache, eye drainage.  He states at baseline he has had erythema since these multiple procedures and this is may be slightly increased.  He was on antibiotic drops for possible corneal abrasion.  He states he has finished these but is otherwise taking all of the multiple eyedrops for his glaucoma as well as taking the Diamox twice a day.  He is still taking this.  He also reports baseline cloudy vision in the right eye that is unchanged.  At this point he is at his baseline and asymptomatic otherwise.  He used his eyedrops about an hour prior to arrival.  Vision is grossly normal in the left eye and he denies any left eye symptoms.  Vision in the right eye he is able to read the name on my badge and correctly identify however many fingers I am holding up.  He has no double vision.  He has no tenderness to palpation of the eye.  He does have a mid fixed slightly dilated pupil on the right without reaction.  Left pupil is reactive.  He has normal extraocular movement and no periorbital swelling or erythema.  Does have some conjunctival injection of the right eye without ciliary flush.  No drainage of the right eye.  The cornea is cloudy on the right.  Per record review with most recent ophthalmology evaluation on 1 April this is all consistent with his prior exam and is unchanged.  Did apply tetracaine and performed intraocular pressure bilaterally.  His intraocular pressure was 11 in the right eye and 11 in the left eye and is within normal limits.  I did also do fluorescein staining of the right eye and there is no obvious uptake or any sign of abrasion or ulcer. No hypopyon.  No sign of periorbital or orbital cellulitis.  Denies any vision change or curtain coming down over the either flashers or floaters making retinal detachment unlikely.  Uncertain why he was " specifically sent into the ER today but I assume it is that whoever he spoke to was concerned that he needed to have his pressures checked with his pretty significant history of glaucoma.  He reports he spoke to the ophthalmology resident at the [facility] but no documentation of these phone calls that I can see in the chart.      I paged ophthalmology at the [facility] to discuss the patient as he has had multiple surgeries and has another surgery coming up on the 15th to see if there was other concerns they had with sending him to the ER.  I spoke with Dr. Mercedes with ophthalmology who also reviewed his prior visits and we discussed his presentation and that really the only symptom he reports is that he may be has some slight increased conjunctival injection but otherwise is at his baseline.  He did not feel that we needed to perform any other interventions and also did not feel that he needed to be transferred to the [facility] for emergent ophthalmology visit today and could follow-up as scheduled in their clinic on Monday.  However again needs to monitor very closely for any change in symptoms or worsening.  I did reiterate this to the patient and I also reiterated that it is very important that he be seen at the [facility] and they are ER if he is worsening as we do not have the ability to have ophthalmology see him here at [facility].  He voiced understanding and was comfortable with this plan and discharge.  We again discussed very strict return precautions that he should continue to use all of his prescribed medications diligently.  He voiced understanding.  He was discharged home in stable condition.         At the conclusion of the encounter I discussed the results of all of the tests and the disposition. The questions were answered. The patient or family acknowledged understanding and was agreeable with the care plan.       Medical Decision Making  Obtained supplemental history:Supplemental  history obtained?: Family Member  Reviewed external records: External records reviewed?: Inpatient Record and Outpatient Record  Care impacted by chronic illness: Chronic angle closure right eye glaucoma  Care significantly affected by social determinants of health:N/A  Did you consider but not order tests?: Work up considered but not performed and documented in chart, if applicable  Did you interpret images independently?: Independent interpretation of ECG and images noted in documentation, when applicable.  Consultation discussion with other provider:Did you involve another provider (consultant, , pharmacy, etc.)?: I discussed the care with another health care provider, see documentation for details.  Discharge. I recommended the patient continue their current prescription strength medication(s): as prescribed. See documentation for any additional details.      MEDICATIONS GIVEN IN THE EMERGENCY:  Medications   tetracaine (PONTOCAINE) 0.5 % ophthalmic solution 1-2 drop (2 drops Both Eyes $Given 4/6/24 1114)   fluorescein (FUL-SAIDA) ophthalmic strip 1 strip (1 strip Right Eye $Given 4/6/24 1114)       NEW PRESCRIPTIONS STARTED AT TODAY'S ER VISIT  Discharge Medication List as of 4/6/2024 12:44 PM             =================================================================    HPI    Patient information was obtained from: patient    Use of : N/A         Power Blanco is a 38 year old male with a pertinent history of chronic angle closure right eye glaucoma s/p unsuccessful LPI on 3/26/2024 and CPC on 3/31/2024 who presents to this ED via private vehicle for evaluation of IOP pressure check    Per chart review, patient with a history of chronic angle closure right eye glaucoma that initially responded to Diamox and topical IOP medication. He underwent an unsuccessful LPI on 3/26/2024 followed by transscleral cyclophotocoagulation with GPROBE on 3/31/2024 at [facility] by Dr. Wolf due to increased IOP. He  "was seen by [facility] on 4/1/2024 (5 days ago) with symptomatic improvement and was recommended to continue on his medications (Brimonidine, Cosopt, Latanoprost, Diamox, Prednisolone drops) and discontinue Ofloxacin. Patient was instructed to discontinue Diamox on 4/2/2024 and return on 4/5/2024 to recheck ocular pressures. Patient also scheduled for trabeculectomy using Mitomycin C on 4/15/2024.    Patient reports onset of frontal headache two days ago. He spoke with his Ophthalmologist yesterday and was instructed to be seen for pressure checks, prompting his ED presentation today. Currently he reports resolution of his headache and denies any eye pain or changes in his vision. He reports his vision has been \"cloudy\" in his right eye since this was diagnosed multiple weeks ago. This is unchanged. He reports he has baseline erythema of the right eye and has been using his eye drops and Diamox as prescribed. He states that the erythema might be slightly increased. No drainage. Patient also reports a three week history of cloudy vision in the right eye, noting this is not worse than usual today. He does not wear contacts or glasses and denies any recent trauma to the eye. Patient otherwise denies fevers, eye drainage, periorbital swelling or erythema, or any other symptoms or concerns at this time.      REVIEW OF SYSTEMS   Review of Systems   Pertinent positives and negatives are documented in the HPI. All other systems reviewed and are negative.      PAST MEDICAL HISTORY:  Past Medical History:   Diagnosis Date    Acute angle-closure glaucoma of right eye     Diabetes mellitus, type 2 (H)     Glaucoma (increased eye pressure)        PAST SURGICAL HISTORY:  Past Surgical History:   Procedure Laterality Date    CHOLECYSTECTOMY  12/21/2022    LASER DIODE CILIARY LESIONS Right 3/31/2024    Procedure: Cyclophotocoagulation;  Surgeon: Milton Wolf MD;  Location: [facility] OR           CURRENT MEDICATIONS:  " "  acetaminophen (TYLENOL) 325 MG tablet  acetaZOLAMIDE (DIAMOX SEQUEL) 500 MG 12 hr capsule  brimonidine (ALPHAGAN) 0.2 % ophthalmic solution  dorzolamide-timolol (COSOPT) 2-0.5 % ophthalmic solution  ibuprofen (ADVIL/MOTRIN) 200 MG tablet  latanoprost (XALATAN) 0.005 % ophthalmic solution  ofloxacin (OCUFLOX) 0.3 % ophthalmic solution  oxyCODONE (ROXICODONE) 5 MG tablet  oxyCODONE (ROXICODONE) 5 MG tablet  prednisoLONE acetate (PRED FORTE) 1 % ophthalmic suspension        ALLERGIES:  No Known Allergies    FAMILY HISTORY:  Family History   Problem Relation Age of Onset    Glaucoma No family hx of     Macular Degeneration No family hx of        SOCIAL HISTORY:   Social History     Socioeconomic History    Marital status:    Tobacco Use    Smoking status: Some Days     Types: Cigarettes, Cigars     Passive exposure: Current    Smokeless tobacco: Never   Substance and Sexual Activity    Alcohol use: Not Currently    Drug use: Yes     Types: Marijuana     Comment: Smokes daily    Sexual activity: Not Currently       VITALS:  BP (!) 152/82   Pulse 58   Temp 99  F (37.2  C)   Resp 16   Ht 1.727 m (5' 8\")   Wt 99.8 kg (220 lb)   SpO2 100%   BMI 33.45 kg/m      PHYSICAL EXAM    Physical Exam  Constitutional: Well developed, Well nourished, NAD, GCS 15  HENT: Normocephalic, Atraumatic, Bilateral external ears normal, Oropharynx normal, mucous membranes moist, Nose normal. Neck-  Normal range of motion, No tenderness, Supple, No stridor.    Eyes: Vision is grossly normal in the left eye and he denies any left eye symptoms.  Vision in the right eye he is able to read the name on my badge and correctly identify however many fingers I am holding up.  He has no double vision.  He has no tenderness to palpation of the eye.  He does have a mid fixed slightly dilated pupil on the right without reaction.  Left pupil is reactive.  He has normal extraocular movement and no periorbital swelling or erythema.  Does have some " conjunctival injection of the right eye without ciliary flush.  No drainage of the right eye.  The cornea is cloudy on the right. IOP 11 bilaterally. No fluorescein uptake in the right eye.   Respiratory: Normal breath sounds, No respiratory distress, No wheezing or crackles, Speaks in full sentences easily.    Cardiovascular: Normal heart rate, Regular rhythm, No murmurs, No rubs, No gallops. 2+ radial pulses bilaterally  GI: Bowel sounds normal, Soft, No tenderness, No masses, No rebound or guarding.   Musculoskeletal: 2+ DP pulses. No notable lower extremity edema. Good range of motion in all major joints.   Integument: Warm, Dry, No erythema, No rash. No petechiae.   Neurologic: Alert & oriented x 3, 5/5 strength in all 4 extremities bilaterally. Sensation intact to light touch in all 4 extremities and the face bilaterally. No focal deficits noted. Normal gait.     Psychiatric: Affect normal, Judgment normal, Mood normal. Cooperative.       PROCEDURES:   PROCEDURE: Woods lamp Exam   INDICATIONS: Right eye hx of glaucoma   PROCEDURE PROVIDER: Dr Anuradha Masters   SITE: Right eye   CONSENT: The risks, benefits and alternatives for this procedure were explained to the patient and verbally accepted.     MEDICATION: fluorescein stain and tetracaine   EXAM FINDINGS: Right Eye: IOP 11, no hypopyon, no fluorescein uptake, negative Bob sign, no dendrites or pseudo dendrites  Left Eye: IOP 11   COMPLICATIONS: Patient tolerated procedure well, without complication          [facility] Documentation:   CMS Diagnoses:               Ekta REAGAN, am serving as a scribe to document services personally performed by Anuradha Matsers MD based on my observation and the provider's statements to me. I, Anuradha Masters MD, attest that Ekta Barbosa is acting in a scribe capacity, has observed my performance of the services and has documented them in accordance with my direction.    Anuradha Masters MD  [facility]  [facility] EMERGENCY ROOM  [address] [id number]  [phone]      Anuradha Masters MD  04/09/24 0628

## 2024-04-06 NOTE — TELEPHONE ENCOUNTER
Called and spoke with patient. His pressures were 10 and 11 at outside hospital. His pressure sensation has resolved and his vision has become slightly less blurry. He took the extra dose of diamox and will continue the twice daily diamox going forward. He will be at follow up appointment on Tuesday. Notified Dr. Wolf.

## 2024-04-08 ENCOUNTER — OFFICE VISIT (OUTPATIENT)
Dept: OPHTHALMOLOGY | Facility: CLINIC | Age: 39
End: 2024-04-08
Attending: STUDENT IN AN ORGANIZED HEALTH CARE EDUCATION/TRAINING PROGRAM

## 2024-04-08 DIAGNOSIS — Z98.890 POSTSURGICAL STATE, EYE: Primary | ICD-10-CM

## 2024-04-08 PROCEDURE — 99024 POSTOP FOLLOW-UP VISIT: CPT | Mod: GC | Performed by: STUDENT IN AN ORGANIZED HEALTH CARE EDUCATION/TRAINING PROGRAM

## 2024-04-08 PROCEDURE — 99211 OFF/OP EST MAY X REQ PHY/QHP: CPT | Performed by: STUDENT IN AN ORGANIZED HEALTH CARE EDUCATION/TRAINING PROGRAM

## 2024-04-08 RX ORDER — KETOROLAC TROMETHAMINE 10 MG/1
10 TABLET, FILM COATED ORAL EVERY 6 HOURS PRN
Qty: 12 TABLET | Refills: 0 | Status: SHIPPED | OUTPATIENT
Start: 2024-04-08 | End: 2024-04-12

## 2024-04-08 ASSESSMENT — TONOMETRY
IOP_METHOD: APPLANATION - VR
OD_IOP_MMHG: 20
OD_IOP_MMHG: 19
IOP_METHOD: TONOPEN
OS_IOP_MMHG: 24
OS_IOP_MMHG: 24

## 2024-04-08 ASSESSMENT — EXTERNAL EXAM - LEFT EYE: OS_EXAM: NORMAL

## 2024-04-08 ASSESSMENT — VISUAL ACUITY
OS_SC: 20/20
METHOD: SNELLEN - LINEAR
OD_SC: 20/80
OS_SC+: -2

## 2024-04-08 ASSESSMENT — SLIT LAMP EXAM - LIDS: COMMENTS: NORMAL

## 2024-04-08 ASSESSMENT — EXTERNAL EXAM - RIGHT EYE: OD_EXAM: NORMAL

## 2024-04-08 NOTE — LETTER
4/8/2024       RE: Power Blanco  [address]  [place] [address]     Dear Colleague,    Thank you for referring your patient, Power Blanco, to the [facility] [place] at [facility]. Please see a copy of my visit note below.    Chief Complaint/Presenting Concern: Glaucoma/postop eye pain    History of Present Illness:   Power Blanco is a 38 year old patient who presents for evaluation of angle closure right eye. His initial presentation in March 2024 was concerning for sildenafil-related angle closure. He reports that in the past he has had similar small episodes of blurry vision and eye pain after using sildenafil. He initially responded to diamox and topical IOP medication. Subsequently an LPI was performed which was unsuccessful 3/26/24.    3/31/24 his IOP spiked and he underwent CPC right eye. He now subjectively feels very improved.     4/8/24: last night started to have right eye irritation, woke up this morning with right eye pain, photosensitivity and blurry vision. Used prednisolone this morning. Unclear if using medications as advised.    Relevant Past Medical/Family/Social History:None    Relevant Review of Systems:None     Chronic angle closure glaucoma right eye, suspect left eye  Previous glaucoma surgery/laser  -3/20/24 unsuccessful LPI right eye, successful left eye   -CPC right eye 3/31/24   Maximum intraocular pressure 53/42  Current meds: see below  Family history: negative  CCT: At future visit  Gonio: right eye with 360 synechial closure, left eye with 270 appositional closure, 90 synechial  Refractive status: Reports no glasses wear as a child  - follows with Dr Wolf    Additional Ocular History: None    Plan/Recommendations:  Discussed findings with patient. 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 There is likely a component of response to  sildenafil. IOP was refractory to LPI and medical management  Reviewed repeat B-scan, optos, slit lamp photo, however, patient defers to tomorrow due to discomfort  S/p TSCPC 3/31/24, significant AC reaction, unclear if patient using drops as advised  Continue brimonidine (purple) three times a day in both eyes  Continue cosopt (yellow) two times per day in both eyes  Continue latanoprost (teal) drops at bedtime in the right eye  Continue PO diamox 500 mg once per day (discontinue on Tuesday as per Dr Wolf)  Increase prednisolone drops q1h in the right eye   Patient requesting pain medication; reviewed with patient that most of pain is likely related to ocular inflammation, compliance advised; will give PO Toradol 10 mg q6h prn pain (medication side effects reviewed); advised to try and avoid any use of blood thinners due to hyphema, however due to severe pain few days Rx'ed for patient  Counseled return/RD precautions    Patient disposition: Return for Follow Up Dr Wolf as scheduled or sooner changes.    Patient discussed with Dr. Wolf    Attending Physician Attestation:  Complete documentation of historical and exam elements from today's encounter can be found in the full encounter summary report (not reduplicated in this progress note).  I personally obtained the chief complaint(s) and history of present illness.  I confirmed and edited as necessary the review of systems, past medical/surgical history, family history, social history, and examination findings as documented by others; and I examined the patient myself.  I personally reviewed the relevant tests, images, and reports as documented above.  I formulated and edited as necessary the assessment and plan and discussed the findings and management plan with the patient and family. . - Bisi Capellan MD        Again, thank you for allowing me to participate in the care of your patient.      Sincerely,    Bisi Capellan MD

## 2024-04-08 NOTE — PATIENT INSTRUCTIONS
Continue brimonidine (purple) three times per day in both eyes  Continue cosopt (yellow) two times per day in both eyes  Continue latanoprost (teal) drops at bedtime in the right eye  Continue oral diamox 500 mg once per day  Continue prednisolone drops every hour to right eye     Start oral toradol 10 mg every 6 hours by mouth as needed for pain

## 2024-04-08 NOTE — PROGRESS NOTES
Chief Complaint/Presenting Concern: Glaucoma/postop eye pain    History of Present Illness:   Power Blanco is a 38 year old patient who presents for evaluation of angle closure right eye. His initial presentation in March 2024 was concerning for sildenafil-related angle closure. He reports that in the past he has had similar small episodes of blurry vision and eye pain after using sildenafil. He initially responded to diamox and topical IOP medication. Subsequently an LPI was performed which was unsuccessful 3/26/24.    3/31/24 his IOP spiked and he underwent CPC right eye. He now subjectively feels very improved.     4/8/24: last night started to have right eye irritation, woke up this morning with right eye pain, photosensitivity and blurry vision. Used prednisolone this morning. Unclear if using medications as advised.    Relevant Past Medical/Family/Social History:None    Relevant Review of Systems:None     Chronic angle closure glaucoma right eye, suspect left eye  Previous glaucoma surgery/laser  -3/20/24 unsuccessful LPI right eye, successful left eye   -CPC right eye 3/31/24   Maximum intraocular pressure 53/42  Current meds: see below  Family history: negative  CCT: At future visit  Gonio: right eye with 360 synechial closure, left eye with 270 appositional closure, 90 synechial  Refractive status: Reports no glasses wear as a child  - follows with Dr Wolf    Additional Ocular History: None    Plan/Recommendations:  Discussed findings with patient. 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 There is likely a component of response to sildenafil. IOP was refractory to LPI and medical management  Reviewed repeat B-scan, optos, slit lamp photo, however, patient defers to tomorrow due to discomfort  S/p TSCPC 3/31/24, significant AC reaction, unclear if patient using drops as advised  Continue brimonidine (purple) three times a day in both  eyes  Continue cosopt (yellow) two times per day in both eyes  Continue latanoprost (teal) drops at bedtime in the right eye  Continue PO diamox 500 mg once per day (discontinue on Tuesday as per Dr Wolf)  Increase prednisolone drops q1h in the right eye   Patient requesting pain medication; reviewed with patient that most of pain is likely related to ocular inflammation, compliance advised; will give PO Toradol 10 mg q6h prn pain (medication side effects reviewed); advised to try and avoid any use of blood thinners due to hyphema, however due to severe pain few days Rx'ed for patient  Counseled return/RD precautions    Patient disposition: Return for Follow Up Dr Wolf as scheduled or sooner changes.    Patient discussed with Dr. Wolf    Attending Physician Attestation:  Complete documentation of historical and exam elements from today's encounter can be found in the full encounter summary report (not reduplicated in this progress note).  I personally obtained the chief complaint(s) and history of present illness.  I confirmed and edited as necessary the review of systems, past medical/surgical history, family history, social history, and examination findings as documented by others; and I examined the patient myself.  I personally reviewed the relevant tests, images, and reports as documented above.  I formulated and edited as necessary the assessment and plan and discussed the findings and management plan with the patient and family. . - Bisi Capellan MD

## 2024-04-08 NOTE — NURSING NOTE
Chief Complaints and History of Present Illnesses   Patient presents with    Pain In Or Around Eye     Chief Complaint(s) and History of Present Illness(es)       Pain In Or Around Eye              Laterality: In right eye    Quality: pressure    Pain scale: 8/10    Associated symptoms: blurred vision, photophobia and headaches.  Negative for nausea    Treatments tried: eye drops              Comments    Here for severe pain in right eye. Vision is blurry. Some HA and photophobia. Using drops.    Vladislav Engel COT 8:27 AM April 8, 2024

## 2024-04-09 ENCOUNTER — PRE VISIT (OUTPATIENT)
Dept: SURGERY | Facility: CLINIC | Age: 39
End: 2024-04-09

## 2024-04-09 ENCOUNTER — OFFICE VISIT (OUTPATIENT)
Dept: OPHTHALMOLOGY | Facility: CLINIC | Age: 39
End: 2024-04-09
Attending: OPHTHALMOLOGY

## 2024-04-09 ENCOUNTER — TELEPHONE (OUTPATIENT)
Dept: OPHTHALMOLOGY | Facility: CLINIC | Age: 39
End: 2024-04-09

## 2024-04-09 DIAGNOSIS — H40.211 ACUTE ANGLE-CLOSURE GLAUCOMA OF RIGHT EYE: ICD-10-CM

## 2024-04-09 DIAGNOSIS — H40.032: ICD-10-CM

## 2024-04-09 DIAGNOSIS — H20.00 ACUTE ANTERIOR UVEITIS OF RIGHT EYE: ICD-10-CM

## 2024-04-09 DIAGNOSIS — Z98.890 POSTSURGICAL STATE, EYE: Primary | ICD-10-CM

## 2024-04-09 DIAGNOSIS — H20.00 ACUTE ANTERIOR UVEITIS OF RIGHT EYE: Primary | ICD-10-CM

## 2024-04-09 DIAGNOSIS — H21.01 HYPHEMA OF RIGHT EYE: ICD-10-CM

## 2024-04-09 PROCEDURE — 92285 EXTERNAL OCULAR PHOTOGRAPHY: CPT | Performed by: OPHTHALMOLOGY

## 2024-04-09 PROCEDURE — 99207 SLIT LAMP PHOTOS OD (RIGHT EYE): CPT | Mod: 26 | Performed by: OPHTHALMOLOGY

## 2024-04-09 PROCEDURE — 99214 OFFICE O/P EST MOD 30 MIN: CPT | Performed by: OPHTHALMOLOGY

## 2024-04-09 PROCEDURE — 99213 OFFICE O/P EST LOW 20 MIN: CPT | Performed by: OPHTHALMOLOGY

## 2024-04-09 PROCEDURE — 99024 POSTOP FOLLOW-UP VISIT: CPT | Performed by: OPHTHALMOLOGY

## 2024-04-09 PROCEDURE — 76512 OPH US DX B-SCAN: CPT | Performed by: OPHTHALMOLOGY

## 2024-04-09 RX ORDER — VALACYCLOVIR HYDROCHLORIDE 1 G/1
1000 TABLET, FILM COATED ORAL 3 TIMES DAILY
Qty: 60 TABLET | Refills: 1 | Status: SHIPPED | OUTPATIENT
Start: 2024-04-09 | End: 2024-04-09

## 2024-04-09 RX ORDER — PREDNISOLONE ACETATE 10 MG/ML
1-2 SUSPENSION/ DROPS OPHTHALMIC
Qty: 10 ML | Refills: 3 | Status: SHIPPED | OUTPATIENT
Start: 2024-04-09 | End: 2024-04-24

## 2024-04-09 RX ORDER — PREDNISONE 10 MG/1
TABLET ORAL
Qty: 90 TABLET | Refills: 1 | Status: SHIPPED | OUTPATIENT
Start: 2024-04-09 | End: 2024-04-24

## 2024-04-09 RX ORDER — BRIMONIDINE TARTRATE 2 MG/ML
1 SOLUTION/ DROPS OPHTHALMIC 3 TIMES DAILY
Qty: 15 ML | Refills: 4 | Status: SHIPPED | OUTPATIENT
Start: 2024-04-09 | End: 2024-04-09

## 2024-04-09 RX ORDER — BRIMONIDINE TARTRATE 2 MG/ML
1 SOLUTION/ DROPS OPHTHALMIC 3 TIMES DAILY
Qty: 15 ML | Refills: 4 | Status: SHIPPED | OUTPATIENT
Start: 2024-04-09

## 2024-04-09 RX ORDER — VALACYCLOVIR HYDROCHLORIDE 1 G/1
1000 TABLET, FILM COATED ORAL 3 TIMES DAILY
Qty: 60 TABLET | Refills: 1 | Status: SHIPPED | OUTPATIENT
Start: 2024-04-09 | End: 2024-04-24

## 2024-04-09 RX ORDER — PREDNISOLONE ACETATE 10 MG/ML
1 SUSPENSION/ DROPS OPHTHALMIC
Qty: 10 ML | Refills: 3 | Status: SHIPPED | OUTPATIENT
Start: 2024-04-09 | End: 2024-04-09

## 2024-04-09 ASSESSMENT — VISUAL ACUITY
OS_SC+: -2
OD_SC: HM
METHOD: SNELLEN - LINEAR
METHOD: SNELLEN - LINEAR
OS_SC+: -2
OS_SC: 20/20
OD_SC: HM
OS_SC: 20/20

## 2024-04-09 ASSESSMENT — TONOMETRY
OD_IOP_MMHG: 27
OS_IOP_MMHG: 22
IOP_METHOD: TONOPEN
IOP_METHOD: TONOPEN
OD_IOP_MMHG: 26
OS_IOP_MMHG: 24

## 2024-04-09 ASSESSMENT — SLIT LAMP EXAM - LIDS
COMMENTS: MILDLY TENDER
COMMENTS: NORMAL
COMMENTS: NORMAL

## 2024-04-09 ASSESSMENT — EXTERNAL EXAM - LEFT EYE
OS_EXAM: NORMAL
OS_EXAM: NORMAL

## 2024-04-09 ASSESSMENT — EXTERNAL EXAM - RIGHT EYE
OD_EXAM: NORMAL
OD_EXAM: NORMAL

## 2024-04-09 ASSESSMENT — CUP TO DISC RATIO: OS_RATIO: 0.4

## 2024-04-09 NOTE — PATIENT INSTRUCTIONS
Continue Drops right eye: Prednisolone (pink) every hour , Brimonidine (Purple) 3x/day, Cosopt (blue top) 2x/day, Latanoprost (Teal top) at night  No drops left eye at this time  Continue Pills: Diamox Pills 500 mg twice daily, toradol 1-2 times per day  Start a pill to treat viruses called Valacyclovir (Valtrex). Take 1 pill (1000 mg) three times a day with meals  Start a pill for inflammation called prednisone. Take 4 of the 10 mg pills (40 mg) each morning with your first meal of the day.

## 2024-04-09 NOTE — TELEPHONE ENCOUNTER
Spoke to pt at 1430    No Oral Prednisone needed     Rx for prednisolone eye drop, Brimonidine and oral Vatrex sent to pt's updated pharmacy per request.    Arik Rangel RN 2:34 PM 04/09/24

## 2024-04-09 NOTE — PROGRESS NOTES
Chief Complaint/Presenting Concern:  Uveitis evaluation    History of Present Ocular Illness:  Power Blanco is a 38 year old patient who returns for evaluation of uveitis. The history began suddenly a few weeks ago with pain and blurry vision .  reports that his vision may have been blurry after taking sildenafil previously but this time was different. He was found to have angle closure right eye without notable inflammation (although cornea noted to be hazy). He had an LPI right eye which closed and also an LPI left eye which ws noted to be maintained. Ultimately, a CPC laser 3/31/24.    Dr. Wolf noted more inflammation than expected right eye including with a hyphema and she requested this visit.     Power reports frequent urination and some shortness of breath on Diamox pills.     Additional Ocular History:  No known history of uveitis nor glaucoma  Did have a prior corneal abrasion from basketball but got better with bandage contact lens    Relevant Medical/Family/Social History:    No medical conditions  No known history of chicken pox  No cold sores    Here with his Wife. No family history of eye problems. NO recent illness or travel. Recent tattoo on had but is not red nor swollen. No recent pets nor home.    Relevant Review of Systems Updates: No rashes. Some shortness of breath on Diamox and tingling. No joint pains, no rashes.     LabS; 3/22/24: Normal CBC. BMP WNL other than slightly elevated glucose     Current eye related medications:   Drops right eye: Prednisolone (pink) every hour , Brimonidine (Purple) 3x/day, Cosopt (blue top) 2x/day, Latanoprost (Teal top) at night  No drops left eye:  Pills: Diamox Pills 500 mg twice daily, toradol started yesterday    Retina/Uveitis Imaging:   OCT Spectralis Macula  March 26, 2024  right eye: Normal contour, no fluid  left eye: Normal contour, no fluid    B-scan right eye April 9, 2024 : Mild vitreous ,.No masses no detachment, Some scleral-choroidal  thickening, no choroidals.    Slit lamp photo April 9, 2024: Corneal edema, inferior candy cane hyphema    Assessment:     1. Acute anterior uveitis of right eye  Very inflamed with fibrin but generally clear vitreous on B-scan    2. Hyphema of right eye  With some mixed WBC but no hypopyon    3. Acute angle-closure glaucoma of right eye  IOP 27    4. Angle-closure glaucoma suspect of left eye  LPI Patent, no inflammation     Plan/Recommendations:    Discussed findings with patient and his Wife. There is very aggressive inflammation after the CPC surgery. It is possible this could have been related to CPC but more likely in someone with a history of inflammation. As below, we will treat inflammation aggessively and   The left eye has no inflammation and can be monitored  Eye pressure is 27, 24  Recommend additional testing: NO AC tap and no labs now  Continue Drops right eye: Prednisolone (pink) every hour , Brimonidine (Purple) 3x/day, Cosopt (blue top) 2x/day, Latanoprost (Teal top) at night  No drops left eye at this time  Continue Pills: Diamox Pills 500 mg twice daily, toradol 1-2 times per day  Start a pill to treat viruses called Valacyclovir (Valtrex). Take 1 pill (1000 mg) three times a day with meals  Start a pill for inflammation called prednisone. Take 4 of the 10 mg pills (40 mg) each morning with your first meal of the day.    RTC 4/12/24. Same day as Luciano. Add JY same day no dilation, no testing    Physician Attestation     Attending Physician Attestation:  Complete documentation of historical and exam elements from today's encounter can be found in the full encounter summary report (not reduplicated in this progress note). I personally obtained the chief complaint(s) and history of present illness. I confirmed and edited as necessary the review of systems, past medical/surgical history, family history, social history, and examination findings as documented by others; and I examined the patient  myself. I personally reviewed the relevant tests, images, and reports as documented above. I formulated and edited as necessary the assessment and plan and discussed the findings and management plan with the patient and family members present at the time of this visit.  Devon Villagomez M.D., Uveitis and Medical Retina, April 9, 2024

## 2024-04-09 NOTE — LETTER
4/9/2024       RE: Power Blanco  [address]  [facility] [address]     Dear Colleague,    Thank you for referring your patient, Power Blanco, to the [facility] at [facility]. Please see a copy of my visit note below.    Chief Complaint/Presenting Concern:  Uveitis evaluation    History of Present Ocular Illness:  Power Blanco is a 38 year old patient who returns for evaluation of uveitis. The history began suddenly a few weeks ago with pain and blurry vision .  reports that his vision may have been blurry after taking Sildenafil previously but this time was different. He was found to have angle closure right eye without notable inflammation (although cornea noted to be hazy). He had an LPI right eye which closed and also an LPI left eye which ws noted to be maintained. Ultimately, a CPC laser 3/31/24.    Dr. Wolf noted more inflammation than expected right eye including with a hyphema and she requested this visit.     Power reports frequent urination and some shortness of breath on Diamox pills.     Additional Ocular History:  No known history of uveitis nor glaucoma  Did have a prior corneal abrasion from basketball but got better with bandage contact lens    Relevant Medical/Family/Social History:    No medical conditions  No known history of chicken pox  No cold sores    Here with his Wife. No family history of eye problems. NO recent illness or travel. Recent tattoo on had but is not red nor swollen. No recent pets nor home.      Relevant Review of Systems Updates: No rashes. Some shortness of breath on Diamox and tingling. No joint pains, no rashes.     LabS; 3/22/24: Normal CBC. BMP WNL other than slightly elevated glucose     Current eye related medications:   Drops right eye: Prednisolone (pink) every hour , Brimonidine (Purple) 3x/day, Cosopt (blue top) 2x/day, Latanoprost (Teal top) at night  No drops left  eye:  Pills: Diamox Pills 500 mg twice daily, toradol started yesterday    Retina/Uveitis Imaging:   OCT Spectralis Macula  March 26, 2024  right eye: Normal contour, no fluid  left eye: Normal contour, no fluid    B-scan right eye April 9, 2024 : Mild vitreous ,.No masses no detachment, Some scleral-choroidal thickening, no choroidals.    Slit lamp photo April 9, 2024: Corneal edema, inferior candy cane hyphema    Assessment:     1. Acute anterior uveitis of right eye  Very inflamed with fibrin but generally clear vitreous on B-scan    2. Hyphema of right eye  With some mixed WBC but no hypopyon    3. Acute angle-closure glaucoma of right eye  IOP 27    4. Angle-closure glaucoma suspect of left eye  LPI Patent, no inflammation     Plan/Recommendations:    Discussed findings with patient and his Wife. There is very aggressive inflammation after the CPC surgery. It is possible this could have been related to CPC but more likely in someone with a history of inflammation. As below, we will treat inflammation aggessively and   The left eye has no inflammation and can be monitored  Eye pressure is 27, 24  Recommend additional testing: NO AC tap and no labs now  Continue Drops right eye: Prednisolone (pink) every hour , Brimonidine (Purple) 3x/day, Cosopt (blue top) 2x/day, Latanoprost (Teal top) at night  No drops left eye at this time  Continue Pills: Diamox Pills 500 mg twice daily, toradol 1-2 times per day  Start a pill to treat viruses called Valacyclovir (Valtrex). Take 1 pill (1000 mg) three times a day with meals  Start a pill for inflammation called prednisone. Take 4 of the 10 mg pills (40 mg) each morning with your first meal of the day.    RTC 4/12/24. Same day as Luciano. Add JY same day no dilation, no testing    Physician Attestation    Attending Physician Attestation:  Complete documentation of historical and exam elements from today's encounter can be found in the full encounter summary report (not  reduplicated in this progress note). I personally obtained the chief complaint(s) and history of present illness. I confirmed and edited as necessary the review of systems, past medical/surgical history, family history, social history, and examination findings as documented by others; and I examined the patient myself. I personally reviewed the relevant tests, images, and reports as documented above. I formulated and edited as necessary the assessment and plan and discussed the findings and management plan with the patient and family members present at the time of this visit.  Devon Villagomez M.D., Uveitis and Medical Retina, April 9, 2024     Again, thank you for allowing me to participate in the care of your patient.      Sincerely,    Devon Villagomez MD  [facility] Dept of Ophthalmology  Uveitis and Medical Retina

## 2024-04-09 NOTE — PROGRESS NOTES
Chief Complaint/Presenting Concern: Glaucoma/postop eye pain    History of Present Illness:   Power Blanco is a 38 year old patient who presents for evaluation of angle closure right eye. His initial presentation in March 2024 was concerning for sildenafil-related angle closure. He reports that in the past he has had similar small episodes of blurry vision and eye pain after using sildenafil. He initially responded to diamox and topical IOP medication. Subsequently an LPI was performed which was unsuccessful 3/26/24.    3/31/24 his IOP spiked and he underwent CPC right eye.    Recently has undergone intermittent right eye pain which lasts about 30 minutes of aching, pressure pain and increased light sensitivity. He was instructed to increase his diamox to twice daily and saw the ED 4/6/24 and had IOP 10/11. Yesterday pain had recurred and he was seen in the clinic with new hyphema. Today he reports that his right eye vision dramatically decreased on awakening. However, pain has resolved since yesterday.    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  Year diagnosis  Previous glaucoma surgery/laser   -3/20/24 unsuccessful LPI right eye, successful left eye    -CPC right eye 3/31/24   Maximum intraocular pressure 53/42  Current meds: see below  Family history: negative  CCT: At future visit  Gonio: right eye with 360 synechial closure, left eye with 270 appositional closure, 90 synechial  Refractive status: Reports no glasses wear as a child  Trauma history: positive - corneal abrasion right eye ~2022  Steroid exposure: negative  Vasospastic disease: Migrane/Raynaud phenomenon: negative  A past hemodynamic crisis or Low BP:: negative  Meds AEs/intolerance: None  PMHx: Positive for: None Negative for : Asthma and respiratory problems/Cardiac/Renal/Kidney stones/Sulfa Allergy  Anticoagulants: None  Prior  testing:  B-scan right eye :  Retina flat and attached, no gross VH  Optos each eye: Lopez montano right eye   OCT mac: wnl each eye     Additional Ocular History: None    Plan/Recommendations:  Discussed findings with patient. 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 There is likely a component of response to sildenafil. IOP was refractory to LPI and medical management  Reviewed repeat UBM however, patient defers to next visit due to discomfort  S/p TSCPC 3/31/24, significant AC reaction, unclear if patient using drops as advised  Continue brimonidine (purple) three times a day in both eyes  Continue cosopt (yellow) two times per day in both eyes  Continue latanoprost (teal) drops at bedtime in the right eye  Continue PO diamox 500 mg once per day (discontinue on Tuesday as per Dr Wolf)  Increase prednisolone drops q1h in the right eye   I discussed the case with Dr. Villagomez our uveitis specialist. It is unusual to have this much inflammation and a hyphema 2 weeks after CPC while on Q2 hours prednisolone. Dr. Villagomez kindly agreed to see the patient today to rule out any underlying inflammation.   Counseled return/RD precautions    RTC in 3 days [facility], IOP

## 2024-04-09 NOTE — TELEPHONE ENCOUNTER
[facility] Call Center    Phone Message    May a detailed message be left on voicemail: yes     Reason for Call: Medication Question or concern regarding medication   Prescription Clarification  Name of Medication: prednisoLONE acetate (PRED FORTE) 1 % ophthalmic suspension, valACYclovir (VALTREX) 1000 mg tablet, brimonidine (ALPHAGAN) 0.2 % ophthalmic solution   Prescribing Provider: Dr. Villagomez    Pharmacy: [place] [address]   What on the order needs clarification? Patient would like all medications sent to the updated pharmacy instead of the [facility] location listed.        Action Taken: Message routed to:  [facility] Eye     Travel Screening: Not Applicable

## 2024-04-09 NOTE — NURSING NOTE
"Chief Complaints and History of Present Illnesses   Patient presents with    Post Op (Ophthalmology) Right Eye     1 week s/p TSC right eye 3/31/24.     Patient notes vision is a lot more blurry in right eye since yesterday. Patient notes pain is improved right eye since yesterday. \"Toradol has helped. I don't know why they have me on drops in my left eye. That eye is fine.\"      Chief Complaint(s) and History of Present Illness(es)       Post Op (Ophthalmology) Right Eye              Laterality: right eye    Onset: weeks ago    Quality: blurred vision    Associated symptoms: eye pain (\"not as bad as yesterday\") and photophobia (\"not as bad as yesterday\").  Negative for headache, flashes and floaters    Treatments tried: eye drops    Pain scale: 5/10    Comments: 1 week s/p [facility] right eye 3/31/24.     Patient notes vision is a lot more blurry in right eye since yesterday. Patient notes pain is improved right eye since yesterday. \"Toradol has helped. I don't know why they have me on drops in my left eye. That eye is fine.\"               Comments    Ocular meds:     Brimonidine (purple) three times a day in both eyes, \"right eye only\"     Cosopt (yellow) two times per day in both eyes, \"right eye only\"     Latanoprost (teal) drops at bedtime in the right eye    Diamox 500 mg once per day, \"told to take twice daily on Sunday\"     Prednisolone drops Q1H in the right eye   -          Toradol Q6H PO    Ruma Washington, COT 9:57 AM 04/09/2024                       "

## 2024-04-10 ASSESSMENT — TONOMETRY
OD_IOP_MMHG: 26
IOP_METHOD: APPLANATION
IOP_METHOD: TONOPEN
IOP_METHOD: APPLANATION
OS_IOP_MMHG: 22
OD_IOP_MMHG: 27
OS_IOP_MMHG: 24
OD_IOP_MMHG: 14

## 2024-04-11 NOTE — TELEPHONE ENCOUNTER
FUTURE VISIT INFORMATION        SURGERY INFORMATION:  Date: 4/15/24  [facility]  Surgeon:  Milton Wolf MD   Anesthesia Type:  General with block  Procedure: TRABECULECTOMY, USING MITOMYCIN C RIGHT   Consult: ov 3/26/24     RECORDS REQUESTED FROM:            Most recent EKG+ Tracin23- [facility]

## 2024-04-12 ENCOUNTER — OFFICE VISIT (OUTPATIENT)
Dept: OPHTHALMOLOGY | Facility: CLINIC | Age: 39
End: 2024-04-12
Attending: OPHTHALMOLOGY

## 2024-04-12 ENCOUNTER — PRE VISIT (OUTPATIENT)
Dept: SURGERY | Facility: CLINIC | Age: 39
End: 2024-04-12

## 2024-04-12 DIAGNOSIS — H43.391 VITREOUS OPACITIES OF RIGHT EYE: ICD-10-CM

## 2024-04-12 DIAGNOSIS — H21.01 HYPHEMA OF RIGHT EYE: ICD-10-CM

## 2024-04-12 DIAGNOSIS — H20.051 HYPOPYON OF RIGHT EYE: ICD-10-CM

## 2024-04-12 DIAGNOSIS — H20.00 ACUTE ANTERIOR UVEITIS OF RIGHT EYE: Primary | ICD-10-CM

## 2024-04-12 DIAGNOSIS — H40.032: ICD-10-CM

## 2024-04-12 DIAGNOSIS — H40.211 ACUTE ANGLE-CLOSURE GLAUCOMA OF RIGHT EYE: ICD-10-CM

## 2024-04-12 DIAGNOSIS — Z98.890 POSTSURGICAL STATE, EYE: Primary | ICD-10-CM

## 2024-04-12 PROCEDURE — 99024 POSTOP FOLLOW-UP VISIT: CPT | Mod: GC | Performed by: OPHTHALMOLOGY

## 2024-04-12 PROCEDURE — 99211 OFF/OP EST MAY X REQ PHY/QHP: CPT | Performed by: OPHTHALMOLOGY

## 2024-04-12 PROCEDURE — 76512 OPH US DX B-SCAN: CPT | Performed by: OPHTHALMOLOGY

## 2024-04-12 PROCEDURE — 99213 OFFICE O/P EST LOW 20 MIN: CPT | Performed by: OPHTHALMOLOGY

## 2024-04-12 PROCEDURE — 76513 OPH US DX ANT SGM US UNI/BI: CPT | Performed by: OPHTHALMOLOGY

## 2024-04-12 RX ORDER — KETOROLAC TROMETHAMINE 10 MG/1
10 TABLET, FILM COATED ORAL EVERY 6 HOURS PRN
Qty: 12 TABLET | Refills: 0 | Status: SHIPPED | OUTPATIENT
Start: 2024-04-12 | End: 2024-04-16

## 2024-04-12 ASSESSMENT — TONOMETRY
IOP_METHOD: TONOPEN
OS_IOP_MMHG: 26
OD_IOP_MMHG: 18
OD_IOP_MMHG: 18
OS_IOP_MMHG: 23
IOP_METHOD: APPLANATION
OD_IOP_MMHG: 17
IOP_METHOD: TONOPEN
OS_IOP_MMHG: 28
OD_IOP_MMHG: 17
IOP_METHOD: TONOPEN
OS_IOP_MMHG: 26
OD_IOP_MMHG: 13
IOP_METHOD: TONOPEN
OS_IOP_MMHG: 28

## 2024-04-12 ASSESSMENT — VISUAL ACUITY
OD_SC: HM
OS_SC: 20/20
OS_SC+: -2
OD_SC: HM
OS_SC: 20/20-2
METHOD: SNELLEN - LINEAR
METHOD: SNELLEN - LINEAR

## 2024-04-12 ASSESSMENT — EXTERNAL EXAM - LEFT EYE: OS_EXAM: NORMAL

## 2024-04-12 ASSESSMENT — SLIT LAMP EXAM - LIDS
COMMENTS: NORMAL
COMMENTS: MILDLY TENDER

## 2024-04-12 ASSESSMENT — CUP TO DISC RATIO: OS_RATIO: 0.4

## 2024-04-12 ASSESSMENT — EXTERNAL EXAM - RIGHT EYE: OD_EXAM: NORMAL

## 2024-04-12 NOTE — NURSING NOTE
Chief Complaints and History of Present Illnesses   Patient presents with    Uveitis Follow-Up     Chief Complaint(s) and History of Present Illness(es)       Uveitis Follow-Up              Laterality: right eye    Onset: gradual    Onset: months ago    Severity: moderate    Frequency: intermittently              Comments    Here for acute anterior uveitis of right eye  s/p TSCPC 3/31/24 right eye     He denies new concerns/changes since LV here.  Denies eye pain.     Oc meds right eye only:   Prednisolone (pink) every hour    Brimonidine (Purple) 3x/day   Cosopt (blue top) 2x/day   Latanoprost (Teal top) at night     Diamox Pills 500 mg twice daily   toradol every day PO   Valacyclovir 1000 mg three times a day with meals   Prednisone 40mg QAM PO     FREDI Camacho 10:47 AM 04/12/2024

## 2024-04-12 NOTE — PATIENT INSTRUCTIONS
No Durezol drops but we will increase the prednisone tablets  Continue  Drops unchanged right eye only: Prednisolone (pink) every hour (no Durezol as we are increasing prednisone dose), Brimonidine (Purple) 3x/day, Cosopt (blue top) 2x/day, Latanoprost (Teal top) at night  Continue these pills unchanged: Acetazolamide 500 mg twice daily,  toradol every night at bedtime-refilled, Valacyclovir 1000 mg three times a day with meals  Let's try to increase the prednisone pills. Please take 2 more tablets (20 mg) for a total of 60 mg today. Then continue taking 6 pills (60 mg) each day until next visit

## 2024-04-12 NOTE — LETTER
4/12/2024       RE: Power Blanco  [address]  [place] [place] [address]     Dear Colleague,    Thank you for referring your patient, Power Blanco, to the [facility] [place] at [facility]. Please see a copy of my visit note below.    Chief Complaint/Presenting Concern:  Uveitis follow up    Interval History of Present Ocular Illness:  Power Blanco is a 38 year old patient who returns for follow up of his uveitis and ocular hypertension. Last visit, we added pills to the drop regimen.    Power he was able to get the medications all three days ago and notes things feel a little better but still blurry and less brown and more clear in color. The right eye was draining a lot yesterday. Left eye still feels fine    Interval Updates to Medical/Family/Social History:  No changes    Relevant Review of Systems Updates:  Some tingling in fingers, some stomach aching. Some jaw line aching possibly from Valtrex    Current eye related medications:  Drops right eye only: Prednisolone (pink) every hour, Brimonidine (Purple) 3x/day, Cosopt (blue top) 2x/day, Latanoprost (Teal top) at night  Pills: Acetazolamide 500 mg twice daily,  toradol every night at bedtime, Valacyclovir 1000 mg three times a day with meals, Prednisone 40 mg each morning.    Retina/Uveitis Imaging:   B-scan ultrasound April 12, 2024  Right eye only: Moderate vitreous opacities, generally stable, no masses, no detachment        UBM April 12, 2024  Right eye: Cornea normal. More hyperechogenic material in AC. Some swelling of nasal ciliary processes and subtle ciliary effusion nasal. No definite disruption of lens capsule.     Assessment:     1. Acute anterior uveitis of right eye  2. Hyphema of right eye  3. Hypopyon of right eye  Feeling better. Still fibrinoid material with layered hypopyon and hyphema, ciliary effusion, intact lens capsule    4. Vitreous opacities of right  eye  Moderate, minimal change compared to last visit but no true vitritis    5. Acute angle-closure glaucoma of right eye  IOP averaged 13 on Drops, diamox    6. Angle-closure glaucoma suspect of left eye  IOP 23 with patent LPI and without drops    Plan/Recommendations:    Discussed findings with patient and his Wife.  There is still active inflammation in front of the right eye although with some improvement in visual clarity and symptoms. There is also associated inflammation in the middle section of the right eye (ciliary body) but no significant posterior inflammation. We will modify oral steroid rather than change steroid drops and observe without eye fluid removal or injection procedures at this time  Eye pressure is 13,23. We will continue drops and pills for pressure lowering. Dr. Wolf is monitoring without urgent surgery on 4/15/24  Recommend additional testing: None at this time  Continue  Drops unchanged right eye only: Prednisolone (pink) every hour (no Durezol as we are increasing prednisone dose), Brimonidine (Purple) 3x/day, Cosopt (blue top) 2x/day, Latanoprost (Teal top) at night  No drops left eye   Continue these pills unchanged: Acetazolamide 500 mg twice daily,  toradol every night at bedtime-refilled, Valacyclovir 1000 mg three times a day with meals  Let's try to increase the prednisone pills. Please take 2 more tablets (20 mg) for a total of 60 mg today. Then continue taking 6 pills (60 mg) each day until next visit    [facility] Tuesday, 4/16. Applanate, no dilation but with B-scan right eye (ordered)    Physician Attestation    Attending Physician Attestation:  Complete documentation of historical and exam elements from today's encounter can be found in the full encounter summary report (not reduplicated in this progress note). I personally obtained the chief complaint(s) and history of present illness. I confirmed and edited as necessary the review of systems, past medical/surgical history,  family history, social history, and examination findings as documented by others; and I examined the patient myself. I personally reviewed the relevant tests, images, and reports as documented above. I formulated and edited as necessary the assessment and plan and discussed the findings and management plan with the patient and family members present at the time of this visit.  Devon Villagomez M.D., Uveitis and Medical Retina, April 12, 2024     Again, thank you for allowing me to participate in the care of your patient.        Sincerely,    Devon Villagomez MD  [facility] Dept of Ophthalmology  Uveitis and Medical Retina

## 2024-04-12 NOTE — PROGRESS NOTES
Chief Complaint/Presenting Concern:  Uveitis follow up    Interval History of Present Ocular Illness:  Power Blanco is a 38 year old patient who returns for follow up of his uveitis and ocular hypertension. Last visit, we added pills to the drop regimen.    Power he was able to get the medications all three days ago and notes things feel a little better but still blurry and less brown and more clear in color. The right eye was draining a lot yesterday. Left eye still feels fine    Interval Updates to Medical/Family/Social History:  No changes    Relevant Review of Systems Updates:  Some tingling in fingers, some stomach aching. Some jaw line aching possibly from Valtrex    Current eye related medications:  Drops right eye only: Prednisolone (pink) every hour, Brimonidine (Purple) 3x/day, Cosopt (blue top) 2x/day, Latanoprost (Teal top) at night  Pills: Acetazolamide 500 mg twice daily,  toradol every night at bedtime, Valacyclovir 1000 mg three times a day with meals, Prednisone 40 mg each morning.    Retina/Uveitis Imaging:   B-scan ultrasound April 12, 2024  Right eye only: Moderate vitreous opacities, generally stable, no masses, no detachment    UBM April 12, 2024  Right eye: Cornea normal. More hyperechogenic material in AC. Some swelling of nasal ciliary processes and subtle ciliary effusion nasal. No definite disruption of lens capsule.     Assessment:     1. Acute anterior uveitis of right eye  2. Hyphema of right eye  3. Hypopyon of right eye  Feeling better. Still fibrinoid material with layered hypopyon and hyphema, ciliary effusion, intact lens capsule    4. Vitreous opacities of right eye  Moderate, minimal change compared to last visit but no true vitritis    5. Acute angle-closure glaucoma of right eye  IOP averaged 13 on Drops, diamox    6. Angle-closure glaucoma suspect of left eye  IOP 23 with patent LPI and without drops    Plan/Recommendations:    Discussed findings with patient and his Wife.   There is still active inflammation in front of the right eye although with some improvement in visual clarity and symptoms. There is also associated inflammation in the middle section of the right eye (ciliary body) but no significant posterior inflammation. We will modify oral steroid rather than change steroid drops and observe without eye fluid removal or injection procedures at this time  Eye pressure is 13,23. We will continue drops and pills for pressure lowering. Dr. Wolf is monitoring without urgent surgery on 4/15/24  Recommend additional testing: None at this time  Continue  Drops unchanged right eye only: Prednisolone (pink) every hour (no Durezol as we are increasing prednisone dose), Brimonidine (Purple) 3x/day, Cosopt (blue top) 2x/day, Latanoprost (Teal top) at night  No drops left eye   Continue these pills unchanged: Acetazolamide 500 mg twice daily,  toradol every night at bedtime-refilled, Valacyclovir 1000 mg three times a day with meals  Let's try to increase the prednisone pills. Please take 2 more tablets (20 mg) for a total of 60 mg today. Then continue taking 6 pills (60 mg) each day until next visit    [facility] Tuesday, 4/16. Applanate, no dilation but with B-scan right eye (ordered)    Physician Attestation     Attending Physician Attestation:  Complete documentation of historical and exam elements from today's encounter can be found in the full encounter summary report (not reduplicated in this progress note). I personally obtained the chief complaint(s) and history of present illness. I confirmed and edited as necessary the review of systems, past medical/surgical history, family history, social history, and examination findings as documented by others; and I examined the patient myself. I personally reviewed the relevant tests, images, and reports as documented above. I formulated and edited as necessary the assessment and plan and discussed the findings and management plan with the  patient and family members present at the time of this visit.  Devon Villagomez M.D., Uveitis and Medical Retina, April 12, 2024

## 2024-04-12 NOTE — PROGRESS NOTES
Chief Complaint/Presenting Concern: Postoperative visit     History of Present Illness:   Power Blanco is a 38 year old patient who presents for evaluation of angle closure right eye. His initial presentation in March 2024 was concerning for sildenafil-related angle closure. He reports that in the past he has had similar small episodes of blurry vision and eye pain after using sildenafil. He initially responded to diamox and topical IOP medication. Subsequently an LPI was performed which was unsuccessful 3/26/24.    3/31/24 his IOP spiked and he underwent CPC right eye.    Today 4/16/24: Vision remains declined in the right eye, no eye pain.     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  Year diagnosis  Previous glaucoma surgery/laser   -3/20/24 unsuccessful LPI right eye, successful left eye    -CPC right eye 3/31/24   Maximum intraocular pressure 53/42  Current meds: see below  Family history: negative  CCT: At future visit  Gonio: right eye with 360 synechial closure, left eye with 270 appositional closure, 90 synechial  Refractive status: Reports no glasses wear as a child  Trauma history: positive - corneal abrasion right eye ~2022  Steroid exposure: negative  Vasospastic disease: Migrane/Raynaud phenomenon: negative  A past hemodynamic crisis or Low BP:: negative  Meds AEs/intolerance: None  PMHx: Positive for: None Negative for : Asthma and respiratory problems/Cardiac/Renal/Kidney stones/Sulfa Allergy  Anticoagulants: None  Prior testing:  B-scan right eye :  Retina flat and attached, no gross VH  Optos each eye: Lopez heme right eye   OCT mac: wnl each eye     Additional Ocular History: None    Plan/Recommendations:  Discussed findings with patient. 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 There is likely a component of response to  sildenafil. IOP was refractory to LPI and medical management  S/p TSCPC 3/31/24, significant AC reaction, possibly a component of ciliary body bleed   Continue brimonidine (purple) three times a day in both eyes  Continue cosopt (yellow) two times per day in both eyes  Continue latanoprost (teal) drops at bedtime in the right eye  Continue PO diamox 500 mg once per day  Continue prednisolone drops q1h in the right eye, possibly switch to Durezol  Continue Prednisone as per Dr. Villagomez, seeing him today   I discussed the case with Dr. Villagomez our uveitis specialist. It is unusual to have this much inflammation and a hyphema 2 weeks after CPC while on Q2 hours prednisolone. Dr. Villagomez kindly assisting in his postoperative care, greatly appreciated.   Counseled return/RD precautions    RTC in 5 days [facility], IOP       Physician Attestation     Attending Physician Attestation:  Complete documentation of historical and exam elements from today's encounter can be found in the full encounter summary report (not reduplicated in this progress note). I personally obtained the chief complaint(s) and history of present illness. I confirmed and edited as necessary the review of systems, past medical/surgical history, family history, social history, and examination findings as documented by others; and I examined the patient myself. I personally reviewed the relevant tests, images, and reports as documented above. I formulated and edited as necessary the assessment and plan and discussed the findings and management plan with the patient and any family members present at the time of the visit.  Milton Wolf M.D., Glaucoma, April 12, 2024

## 2024-04-12 NOTE — LETTER
April 12, 2024      RE: Power Blanco  [address]  [place] [address]       To whom it may concern:    Power Blanco was seen in our clinic today. He  may return to work with the following: Light duty on or about 4/15/24.    Sincerely,        Karolina Bernardo MD

## 2024-04-16 ENCOUNTER — OFFICE VISIT (OUTPATIENT)
Dept: OPHTHALMOLOGY | Facility: CLINIC | Age: 39
End: 2024-04-16
Attending: OPHTHALMOLOGY

## 2024-04-16 DIAGNOSIS — H20.051 HYPOPYON OF RIGHT EYE: ICD-10-CM

## 2024-04-16 DIAGNOSIS — H20.00 ACUTE ANTERIOR UVEITIS OF RIGHT EYE: Primary | ICD-10-CM

## 2024-04-16 DIAGNOSIS — H43.391 VITREOUS OPACITIES OF RIGHT EYE: ICD-10-CM

## 2024-04-16 DIAGNOSIS — H21.01 HYPHEMA OF RIGHT EYE: ICD-10-CM

## 2024-04-16 DIAGNOSIS — H40.032: ICD-10-CM

## 2024-04-16 DIAGNOSIS — H40.211 ACUTE ANGLE-CLOSURE GLAUCOMA OF RIGHT EYE: ICD-10-CM

## 2024-04-16 RX ORDER — ACETAZOLAMIDE 500 MG/1
500 CAPSULE, EXTENDED RELEASE ORAL DAILY
Qty: 30 CAPSULE | Refills: 1 | Status: SHIPPED | OUTPATIENT
Start: 2024-04-16 | End: 2024-04-30

## 2024-04-16 RX ORDER — KETOROLAC TROMETHAMINE 10 MG/1
10 TABLET, FILM COATED ORAL EVERY 6 HOURS PRN
Qty: 15 TABLET | Refills: 0 | Status: SHIPPED | OUTPATIENT
Start: 2024-04-16 | End: 2024-06-06

## 2024-04-16 ASSESSMENT — EXTERNAL EXAM - RIGHT EYE
OD_EXAM: NORMAL
OD_EXAM: NORMAL

## 2024-04-16 ASSESSMENT — TONOMETRY
IOP_METHOD: APPLANATION
OD_IOP_MMHG: 20
OS_IOP_MMHG: 23

## 2024-04-16 ASSESSMENT — VISUAL ACUITY
OD_PH_SC+: -1
OS_SC+: -3
OS_SC: 20/20
METHOD: SNELLEN - LINEAR
OD_SC+: -1
OD_PH_SC: 20/80
OD_SC: 20/100

## 2024-04-16 ASSESSMENT — EXTERNAL EXAM - LEFT EYE
OS_EXAM: NORMAL
OS_EXAM: NORMAL

## 2024-04-16 ASSESSMENT — SLIT LAMP EXAM - LIDS
COMMENTS: MILDLY TENDER
COMMENTS: NORMAL
COMMENTS: NORMAL

## 2024-04-16 ASSESSMENT — CUP TO DISC RATIO: OS_RATIO: 0.4

## 2024-04-16 NOTE — PROGRESS NOTES
"Chief Complaint/Presenting Concern:  Uveitis follow up    Interval History of Present Ocular Illness:  Power Blanco is a 38 year old patient who returns for follow up of uveitis and hypertension. At the last visit on 4/12/24, we increased the prednisone dose.    Mr. Blanco feels that the inflammation in his eye has gone down and there is frequent \"draining\" of fluid from his right eye. The cloudiness in his eye has gotten better. He feels that he has gotten 35-40% of his vision back. He has occasional discomfort but no significant pain. Left eye still okay    Interval Updates to Medical/Family/Social History:  No changes     Relevant Review of Systems Updates:  Still trying to sleep upright. Energy is low in the morning but gets better over the course of the day    Laboratory Testing: No new testing     Current eye related medications:   Right eye only: Prednisolone every hour, Brimonidine 3x/day, Cosopt 2x/day, Latanoprost once at night  Pills: Diamox 2x/day (ran out last night). Prednisone 60 mg daily, Valacyclovir 1000 mg three times daily  No drops in left eye.     Retina/Uveitis Imaging:     B-Scan Ultrasound April 16, 2024   Right eye only: moderate vitreous opacities stable to improved, no masses, no retinal detachment, some improvement in scleral-choroidal thickening, stable from 4/12    Assessment:     1. Acute anterior uveitis of right eye  2. Hyphema of right eye  3. Hypopyon of right eye  Still present but hyphema now more layered, improving fibrin nasall     4. Vitreous opacities of right eye  Moderate vitreous opacities, no true vitritis, stable to slightly improved since last visit     5. Acute angle-closure glaucoma of right eye  IOP 20 on drops and Diamox    6. Angle-closure glaucoma suspect of left eye  IOP 23 with patent LPI and without drops    Plan/Recommendations:    Discussed findings with patient and his Wife. There is continued inflammation in the anterior chamber of the right eye but " improving. We will continue treatments for now.  The left eye is doing well without inflammation  Eye pressure is 20, 23. Plan to continue drops and pills to lower IOP. Dr. Wolf is following.  Recommend additional testing: None at this time.  Continue Valacyclovir 1000 mg three times a day with meals  For the Prednisone 60 mg each morning through Sunday, April 21. Then starting on Monday, 4/22, reduce to 40 mg each morning until next visit  For the Acetazolamide tablets, okay to go down to just one 500 mg tablet in the morning.   Okay to continue Toradol at night when needed.  Continue drops Right eye unchanged:Prednisolone every hour, Brimonidine 3x/day, Cosopt 2x/day, Latanoprost once at night  No injections, no lasers, no drops left eye     RTC Wed, 4/23 with Hernando. Applanate, no dilation, no testing    Melodie Lafleur, MS3    Attending Physician Attestation:  Complete documentation of historical and exam elements from today's encounter can be found in the full encounter summary report (not reduplicated in this progress note). I reviewed the chief complaint(s) and history of present illness, and  confirmed and edited as necessary the review of systems, past medical/surgical history, family history, social history, and examination findings as documented by others and the Medical Student    I examined the patient myself, discussed the findings, reviewed all ancillary testing data and modified these results and reports along with the assessment and plan with the Medical Student. I agree with the note as detailed above.   Devon Villagomez M.D.  Uveitis and Medical Retina  April 16, 2024

## 2024-04-16 NOTE — PATIENT INSTRUCTIONS
Continue Valacyclovir 1000 mg three times a day with meals  For the Prednisone 60 mg each morning through Sunday, April 21. Then starting on Monday, 4/22, reduce to 40 mg each morning until next visit  For the Acetazolamide tablets, okay to go down to just one 500 mg tablet in the morning.   Okay to continue Toradol at night when needed.  Continue drops Right eye unchanged:Prednisolone every hour, Brimonidine 3x/day, Cosopt 2x/day, Latanoprost once at night  No injections, no lasers, no drops left eye

## 2024-04-16 NOTE — LETTER
April 16, 2024      Re: Power Blanco   1985    To Whom It May Concern:    This is to confirm that the above patient was seen on 4/16/2024.  Power Blanco is able to continue light duty for 4-6 hours each day. Due to ongoing inflammation and eye pressure issues, surgery has been postponed but we will reassess things at the upcoming follow up appointments.     Thank you for your cooperation in this matter.  Please do not hesitate to contact me if you have any further questions.    Sincerely,      NATO URBANO         Patient scheduled for colonoscopy on 11/3 with Daija Multani appt scheduled for na  Covid order placed   Patient aware of new quarantine guidelines after Covid test   Case request completed/Referral completed   prep to be ordered by clinical team [place]-message routed   Preferred pharmacy selected   instructions reviewed with patient and  sent via patient portal   Perlita Harris  verbalized understanding of information given.  Gloria Guerrero July 1, 2022

## 2024-04-16 NOTE — NURSING NOTE
"Chief Complaints and History of Present Illnesses   Patient presents with    Follow Up     4 day follow up Acute anterior uveitis of right eye     Chief Complaint(s) and History of Present Illness(es)       Follow Up              Associated symptoms: headache and photophobia.  Negative for eye pain    Treatments tried: eye drops    Pain scale: 0/10    Comments: 4 day follow up Acute anterior uveitis of right eye              Comments    Pt states right eye \"vision has improved 30%\" since last visit  Denies eye pain today. Still has slight photophobia and headaches.   He tell me he's ran out of Diamox pills last night.   No other concerns.     Ocular meds right eye only:   Prednisolone (pink) every hour    Brimonidine (Purple) 3x/day   Cosopt (blue top) 2x/day   Latanoprost (Teal top) qPM    Diamox Pills 500 mg twice daily  (ran out)  toradol every day PO  (one tablet left)  Valacyclovir 1000 mg three times a day with meals   Prednisone 40mg QAM PO     Negrito Stokes 12:46 PM April 16, 2024                    "

## 2024-04-16 NOTE — LETTER
"4/16/2024       RE: Power Blanco  [address]  [place] [address]     Dear Colleague,    Thank you for referring your patient, Power Blanco, to the [facility] [place] at [facility]. Please see a copy of my visit note below.    Chief Complaint/Presenting Concern:  Uveitis follow up    Interval History of Present Ocular Illness:  Power Blanco is a 38 year old patient who returns for follow up of uveitis and hypertension. At the last visit on 4/12/24, we increased the prednisone dose.    Mr. Blanco feels that the inflammation in his eye has gone down and there is frequent \"draining\" of fluid from his right eye. The cloudiness in his eye has gotten better. He feels that he has gotten 35-40% of his vision back. He has occasional discomfort but no significant pain. Left eye still okay    Interval Updates to Medical/Family/Social History:  No changes     Relevant Review of Systems Updates:  Still trying to sleep upright. Energy is low in the morning but gets better over the course of the day    Laboratory Testing: No new testing     Current eye related medications:   Right eye only: Prednisolone every hour, Brimonidine 3x/day, Cosopt 2x/day, Latanoprost once at night  Pills: Diamox 2x/day (ran out last night). Prednisone 60 mg daily, Valacyclovir 1000 mg three times daily  No drops in left eye.     Retina/Uveitis Imaging:     B-Scan Ultrasound April 16, 2024   Right eye only: moderate vitreous opacities stable to improved, no masses, no retinal detachment, some improvement in scleral-choroidal thickening, stable from 4/12    Assessment:     1. Acute anterior uveitis of right eye  2. Hyphema of right eye  3. Hypopyon of right eye  Still present but hyphema now more layered, improving fibrin nasall     4. Vitreous opacities of right eye  Moderate vitreous opacities, no true vitritis, stable to slightly improved since last visit     5. Acute " angle-closure glaucoma of right eye  IOP 20 on drops and Diamox    6. Angle-closure glaucoma suspect of left eye  IOP 23 with patent LPI and without drops    Plan/Recommendations:    Discussed findings with patient and his Wife. There is continued inflammation in the anterior chamber of the right eye but improving. We will continue treatments for now.  The left eye is doing well without inflammation  Eye pressure is 20, 23. Plan to continue drops and pills to lower IOP. Dr. Wolf is following.  Recommend additional testing: None at this time.  Continue Valacyclovir 1000 mg three times a day with meals  For the Prednisone 60 mg each morning through Sunday, April 21. Then starting on Monday, 4/22, reduce to 40 mg each morning until next visit  For the Acetazolamide tablets, okay to go down to just one 500 mg tablet in the morning.   Okay to continue Toradol at night when needed.  Continue drops Right eye unchanged:Prednisolone every hour, Brimonidine 3x/day, Cosopt 2x/day, Latanoprost once at night  No injections, no lasers, no drops left eye     RTC Wed, 4/23 with Hernando. Applanate, no dilation, no testing    Melodie Lafleur, MS3    Attending Physician Attestation:  Complete documentation of historical and exam elements from today's encounter can be found in the full encounter summary report (not reduplicated in this progress note). I personally obtained the chief complaint(s) and history of present illness. I confirmed and edited as necessary the review of systems, past medical/surgical history, family history, social history, and examination findings as documented by others; and I examined the patient myself. I personally reviewed the relevant tests, images, and reports as documented above. I formulated and edited as necessary the assessment and plan and discussed the findings and management plan with the patient and family.  Devon Villagomez MD.      Again, thank you for allowing me to participate in the  care of your patient.      Sincerely,    Devon Villagomez MD  [facility] Dept of Ophthalmology  Uveitis and Medical Retina

## 2024-04-19 ENCOUNTER — TELEPHONE (OUTPATIENT)
Dept: OPHTHALMOLOGY | Facility: CLINIC | Age: 39
End: 2024-04-19

## 2024-04-19 NOTE — TELEPHONE ENCOUNTER
Patient reports that his eye is doing well - has had one day of no diamox as ran out  (Wednesday) but has since had a refill. Does not need refills on any other medication. Otherwise no pain, no new visual symptoms.

## 2024-04-24 ENCOUNTER — OFFICE VISIT (OUTPATIENT)
Dept: OPHTHALMOLOGY | Facility: CLINIC | Age: 39
End: 2024-04-24
Attending: OPHTHALMOLOGY

## 2024-04-24 DIAGNOSIS — H40.211 ACUTE ANGLE-CLOSURE GLAUCOMA OF RIGHT EYE: ICD-10-CM

## 2024-04-24 DIAGNOSIS — H20.051 HYPOPYON OF RIGHT EYE: ICD-10-CM

## 2024-04-24 DIAGNOSIS — H21.01 HYPHEMA OF RIGHT EYE: ICD-10-CM

## 2024-04-24 DIAGNOSIS — H20.00 ACUTE ANTERIOR UVEITIS OF RIGHT EYE: Primary | ICD-10-CM

## 2024-04-24 DIAGNOSIS — H43.391 VITREOUS OPACITIES OF RIGHT EYE: ICD-10-CM

## 2024-04-24 PROCEDURE — 99213 OFFICE O/P EST LOW 20 MIN: CPT | Performed by: OPHTHALMOLOGY

## 2024-04-24 RX ORDER — PREDNISOLONE ACETATE 10 MG/ML
1-2 SUSPENSION/ DROPS OPHTHALMIC
Qty: 10 ML | Refills: 3 | Status: SHIPPED | OUTPATIENT
Start: 2024-04-24

## 2024-04-24 RX ORDER — VALACYCLOVIR HYDROCHLORIDE 1 G/1
1000 TABLET, FILM COATED ORAL 3 TIMES DAILY
Qty: 60 TABLET | Refills: 1 | Status: SHIPPED | OUTPATIENT
Start: 2024-04-24 | End: 2024-04-30

## 2024-04-24 RX ORDER — PREDNISONE 10 MG/1
TABLET ORAL
Qty: 120 TABLET | Refills: 1 | Status: SHIPPED | OUTPATIENT
Start: 2024-04-24 | End: 2024-06-28

## 2024-04-24 ASSESSMENT — VISUAL ACUITY
OD_SC: 20/200
OS_SC+: -1
METHOD: SNELLEN - LINEAR
OS_SC: 20/20

## 2024-04-24 ASSESSMENT — TONOMETRY
IOP_METHOD: APPLANATION
OD_IOP_MMHG: 42
IOP_METHOD: TONOPEN
OS_IOP_MMHG: 21
OS_IOP_MMHG: 20
OD_IOP_MMHG: 40

## 2024-04-24 ASSESSMENT — SLIT LAMP EXAM - LIDS: COMMENTS: NORMAL

## 2024-04-24 ASSESSMENT — CONF VISUAL FIELD
OS_INFERIOR_TEMPORAL_RESTRICTION: 0
OD_INFERIOR_TEMPORAL_RESTRICTION: 0
OS_SUPERIOR_NASAL_RESTRICTION: 0
OD_NORMAL: 1
METHOD: COUNTING FINGERS
OD_SUPERIOR_TEMPORAL_RESTRICTION: 0
OS_INFERIOR_NASAL_RESTRICTION: 0
OS_NORMAL: 1
OD_SUPERIOR_NASAL_RESTRICTION: 0
OD_INFERIOR_NASAL_RESTRICTION: 0
OS_SUPERIOR_TEMPORAL_RESTRICTION: 0

## 2024-04-24 ASSESSMENT — EXTERNAL EXAM - LEFT EYE: OS_EXAM: NORMAL

## 2024-04-24 ASSESSMENT — EXTERNAL EXAM - RIGHT EYE: OD_EXAM: NORMAL

## 2024-04-24 ASSESSMENT — CUP TO DISC RATIO
OS_RATIO: 0.4
OD_RATIO: 0.5

## 2024-04-24 NOTE — PATIENT INSTRUCTIONS
Continue Diamox 500 mg twice daily  Continue Valtrex 1000 mg 3x/day   For the oral Prednisone, let's start going down. Starting tomorrow, reduce to 40 mg (4 pills) until next visit  For the steroid drops (Prednisolone), let's go for every 2 hours (six times a day)  Restart all of these medications:  Restart Brimonidine 3x/day right eye  Restart Cosopt (dark blue top) 3x/day right eye  Restart Latanoprost at bedtime right eye

## 2024-04-24 NOTE — LETTER
4/24/2024       RE: Power Blanco  [address]  [place] [address]     Dear Colleague,    Thank you for referring your patient, Power Blanco, to the [facility] [place] at [facility]. Please see a copy of my visit note below.    Chief Complaint/Presenting Concern:  Uveitis follow up.    Interval History of Present Ocular Illness:  Power Blanco is a 38 year old patient who returns for follow up of anterior uveitis with hypopyon and hyphema following CPC laser right eye. Patient was seen one week ago and was improving at that time and oral prednisone was recommended to be tapered.    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 For the last two weeks he has been using Prednisolone 6 times daily until last visit and then he increased it to every 1 hour but this last weekend he started using it as needed when his eyes feel dry about 2-3 times daily. Not using purple top, blue top, green top.    Power feels like the vision has improved about 60% since early April. He has surface irritation but no pressure or headache sensation around the eye. He is seeing a crescent shaped visual disturbance that is present intermittently and is present with eyes open and closed.Still sleeping sitting up.    Interval Updates to Medical/Family/Social History:  No updates to medical health.    Relevant Review of Systems Updates:  Continued fatigue and low energy when exercising. Denies nausea, insomnia, headaches.    Laboratory Testing: No new testing     Current eye related medications  (currently using)  Right eye only: Prednisolone 2-3 times daily since this weekend (was using every 1 hour prior to this weekend)   Pills: Diamox 500 mg twice daily, Prednisone 60 mg daily, Valacyclovir 1000 mg three times  daily    Retina/Uveitis Imaging: No testing today.    Assessment:     1. Acute anterior uveitis of right eye  2. Hyphema of right eye  3. Hypopyon of right eye  Stable to slightly increased now layered mixed hyphema-hypopyon This is layering out with less fibrinoid reaction and some view to fundus    4. Vitreous opacities of right eye  Improving view to fundus.    5. Acute angle-closure glaucoma of right eye  Worsening IOP elevation today to 40. Patient has self-discontinued glaucoma drops 2 weeks ago but still taking Diamox 500 mg twice daily.    Plan/Recommendations:    Discussed findings with patient and his Wife. The inflammation is improving but eye pressure is high. We discussed the importance of treatment of both inflammation and eye pressure and even some of the drops are better than without any of them. We will modify inflammation treatment as well.   Given very high pressure we discussed risk of permanent blindness if this does not improve. Patient may require surgical AC washout given significantly elevated IOP today. Fortunately no blood staining but there may still be a need for surgery to lower the pressure later. We will resume drops and recheck in 2 days.  Eye pressure is 20 in the left eye, laser iridotomy is patent and there is no inflammation . We can monitor the left eye.   Recommend additional testing: None  Continue Diamox 500 mg twice daily.  Continue Valtrex 1000 mg 3x/day.  For the oral Prednisone, let's start going down. Starting tomorrow, reduce to 40 mg (4 pills) until next visit.  For the steroid drops (Prednisolone), let's go for every 2 hours (six times a day).  Restart all of these medications:  Restart Brimonidine 3x/day right eye  Restart Cosopt (dark blue top) 3x/day right eye  Restart Latanoprost at bedtime right eye    RTC Dr. Wolf and [place] early AM on Friday, 4/26    Eli Lo MD  PGY3 Ophthalmology Resident  [facility]    Attending Physician  Attestation:  Complete documentation of historical and exam elements from today's encounter can be found in the full encounter summary report (not reduplicated in this progress note). I reviewed the chief complaint(s) and history of present illness, and  confirmed and edited as necessary the review of systems, past medical/surgical history, family history, social history, and examination findings as documented by others and the treating Resident or Fellow Physician.    I examined the patient myself, discussed the findings, reviewed all ancillary testing data and modified these results and reports along with the assessment and plan with the Treating Resident or Fellow Physician. I agree with the note as detailed above.   Devon Villagomez M.D.  Uveitis and Medical Retina  April 24, 2024        Again, thank you for allowing me to participate in the care of your patient.      Sincerely,    Devon Villagomez MD  [facility] Dept of Ophthalmology  Uveitis and Medical Retina

## 2024-04-24 NOTE — NURSING NOTE
Chief Complaints and History of Present Illnesses   Patient presents with    Uveitis Follow-Up     Chief Complaint(s) and History of Present Illness(es)       Uveitis Follow-Up              Laterality: right eye    Onset: gradual    Onset: months ago    Quality: States the va is improving     Severity: moderate    Frequency: intermittently    Associated symptoms: photophobia (has decreased), flashes (started about a week ago) and floaters (in the OS)    Treatments tried: eye drops    Pain scale: 0/10              Comments    Here for acute anterior uveitis of right eye  brimonidine (purple) three times a day in both eyes  cosopt (yellow) two times per day in both eyes  latanoprost (teal) drops at bedtime in the right eye  Prednisolone prn last used this morning   Prednisone 6 tabs   Valacyclovir   Ying Chambers COT 12:56 PM April 24, 2024

## 2024-04-24 NOTE — PROGRESS NOTES
Chief Complaint/Presenting Concern:  Uveitis follow up    Interval History of Present Ocular Illness:  Power Blanco is a 38 year old patient who returns for follow up of anterior uveitis with hypopyon and hyphema following CPC laser right eye. Patient was seen one week ago and was improving at that time and oral prednisone was recommended to be tapered.    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 For the last two weeks he has been using Prednisolone 6 times daily until last visit and then he increased it to every 1 hour but this last weekend he started using it as needed when his eyes feel dry about 2-3 times daily. Not using purple top, blue top, green top    Power feels like the vision has improved about 60% since early April. He has surface irritation but no pressure or headache sensation around the eye. He is seeing a crescent shaped visual disturbance that is present intermittently and is present with eyes open and closed.Still sleeping sitting up.    Interval Updates to Medical/Family/Social History:  No updates to medical health.    Relevant Review of Systems Updates:  Continued fatigue and low energy when exercising. Denies nausea, insomnia, headaches.    Laboratory Testing: No new testing     Current eye related medications  (currently using)  Right eye only: Prednisolone 2-3 times daily since this weekend (was using every 1 hour prior to this weekend)   Pills: Diamox 500 mg twice daily, Prednisone 60 mg daily, Valacyclovir 1000 mg three times daily    Retina/Uveitis Imaging: No testing today.    Assessment:     1. Acute anterior uveitis of right eye  2. Hyphema of right eye  3. Hypopyon of right eye  Stable to slightly increased now layered mixed hyphema-hypopyon This is layering out with less fibrinoid reaction and some view to fundus    4. Vitreous  opacities of right eye  Improving view to fundus.    5. Acute angle-closure glaucoma of right eye  Worsening IOP elevation today to 40. Patient has self-discontinued glaucoma drops 2 weeks ago but still taking Diamox 500 mg twice daily.    Plan/Recommendations:    Discussed findings with patient and his Wife. The inflammation is improving but eye pressure is high. We discussed the importance of treatment of both inflammation and eye pressure and even some of the drops are better than without any of them. We will modify inflammation treatment as well.   Given very high pressure we discussed risk of permanent blindness if this does not improve. Patient may require surgical AC washout given significantly elevated IOP today. Fortunately no blood staining but there may still be a need for surgery to lower the pressure later. We will resume drops and recheck in 2 days  Eye pressure is 20 in the left eye, laser iridotomy is patent and there is no inflammation . We can monitor the left eye.   Recommend additional testing: None  Continue Diamox 500 mg twice daily  Continue Valtrex 1000 mg 3x/day   For the oral Prednisone, let's start going down. Starting tomorrow, reduce to 40 mg (4 pills) until next visit  For the steroid drops (Prednisolone), let's go for every 2 hours (six times a day)  Restart all of these medications:  Restart Brimonidine 3x/day right eye  Restart Cosopt (dark blue top) 3x/day right eye  Restart Latanoprost at bedtime right eye    RTC Dr. Wolf and [place] early AM on Friday, 4/26    Eli Lo MD  PGY3 Ophthalmology Resident  [facility]    Attending Physician Attestation:  Complete documentation of historical and exam elements from today's encounter can be found in the full encounter summary report (not reduplicated in this progress note). I reviewed the chief complaint(s) and history of present illness, and  confirmed and edited as necessary the review of systems, past  medical/surgical history, family history, social history, and examination findings as documented by others and the treating Resident or Fellow Physician.    I examined the patient myself, discussed the findings, reviewed all ancillary testing data and modified these results and reports along with the assessment and plan with the Treating Resident or Fellow Physician. I agree with the note as detailed above.   Devon Villagomez M.D.  Uveitis and Medical Retina  April 24, 2024

## 2024-04-30 ENCOUNTER — OFFICE VISIT (OUTPATIENT)
Dept: OPHTHALMOLOGY | Facility: CLINIC | Age: 39
End: 2024-04-30
Attending: OPHTHALMOLOGY

## 2024-04-30 DIAGNOSIS — H20.00 ACUTE ANTERIOR UVEITIS OF RIGHT EYE: Primary | ICD-10-CM

## 2024-04-30 DIAGNOSIS — H21.01 HYPHEMA OF RIGHT EYE: ICD-10-CM

## 2024-04-30 DIAGNOSIS — H20.051 HYPOPYON OF RIGHT EYE: ICD-10-CM

## 2024-04-30 DIAGNOSIS — H40.211 ACUTE ANGLE-CLOSURE GLAUCOMA OF RIGHT EYE: ICD-10-CM

## 2024-04-30 DIAGNOSIS — H40.032: ICD-10-CM

## 2024-04-30 PROCEDURE — 99213 OFFICE O/P EST LOW 20 MIN: CPT | Performed by: OPHTHALMOLOGY

## 2024-04-30 RX ORDER — VALACYCLOVIR HYDROCHLORIDE 1 G/1
1000 TABLET, FILM COATED ORAL 2 TIMES DAILY
Qty: 30 TABLET | Refills: 2 | Status: SHIPPED | OUTPATIENT
Start: 2024-04-30 | End: 2024-05-22

## 2024-04-30 RX ORDER — ACETAZOLAMIDE 500 MG/1
500 CAPSULE, EXTENDED RELEASE ORAL DAILY
Qty: 60 CAPSULE | Refills: 1 | Status: SHIPPED | OUTPATIENT
Start: 2024-04-30 | End: 2024-06-03

## 2024-04-30 ASSESSMENT — CONF VISUAL FIELD
OS_INFERIOR_NASAL_RESTRICTION: 0
METHOD: COUNTING FINGERS
OD_SUPERIOR_TEMPORAL_RESTRICTION: 0
OD_SUPERIOR_TEMPORAL_RESTRICTION: 0
OS_NORMAL: 1
OD_INFERIOR_NASAL_RESTRICTION: 0
OS_INFERIOR_TEMPORAL_RESTRICTION: 0
OD_INFERIOR_NASAL_RESTRICTION: 0
OS_INFERIOR_TEMPORAL_RESTRICTION: 0
OD_NORMAL: 1
OD_INFERIOR_TEMPORAL_RESTRICTION: 0
OD_SUPERIOR_NASAL_RESTRICTION: 0
OS_INFERIOR_NASAL_RESTRICTION: 0
OS_SUPERIOR_NASAL_RESTRICTION: 0
OD_SUPERIOR_NASAL_RESTRICTION: 0
OS_SUPERIOR_TEMPORAL_RESTRICTION: 0
OS_SUPERIOR_TEMPORAL_RESTRICTION: 0
METHOD: COUNTING FINGERS
OD_NORMAL: 1
OS_NORMAL: 1
OS_SUPERIOR_NASAL_RESTRICTION: 0
OD_INFERIOR_TEMPORAL_RESTRICTION: 0

## 2024-04-30 ASSESSMENT — TONOMETRY
OS_IOP_MMHG: 18
IOP_METHOD: APPLANATION
OS_IOP_MMHG: 18
OD_IOP_MMHG: 33

## 2024-04-30 ASSESSMENT — CUP TO DISC RATIO
OS_RATIO: 0.4
OD_RATIO: 0.5

## 2024-04-30 ASSESSMENT — EXTERNAL EXAM - RIGHT EYE: OD_EXAM: NORMAL

## 2024-04-30 ASSESSMENT — VISUAL ACUITY
OD_PH_SC: 20/50
OD_PH_SC+: -1
OD_SC: 20/80
OS_SC+: -2
METHOD: SNELLEN - LINEAR
OD_SC: 20/80
OS_SC: 20/20
OD_PH_SC: 20/50
OS_SC: 20/20
OD_PH_SC+: -1
METHOD: SNELLEN - LINEAR
OS_SC+: -2

## 2024-04-30 ASSESSMENT — SLIT LAMP EXAM - LIDS: COMMENTS: NORMAL

## 2024-04-30 ASSESSMENT — EXTERNAL EXAM - LEFT EYE: OS_EXAM: NORMAL

## 2024-04-30 NOTE — NURSING NOTE
Chief Complaints and History of Present Illnesses   Patient presents with    Uveitis Follow-Up     Chief Complaint(s) and History of Present Illness(es)       Uveitis Follow-Up              Laterality: right eye    Onset: gradual    Onset: months ago    Quality: States the va is doing ok    Severity: moderate    Frequency: intermittently    Associated symptoms: tearing and photophobia.  Negative for dryness, redness, flashes and floaters    Treatments tried: eye drops    Pain scale: 0/10              Comments    Here for acute anterior uveitis of right eye  Prednisolone q2h right eye   Brimonidine 3x/day right eye  Cosopt (dark blue top) 3x/day right eye  Latanoprost at bedtime right eye  Prednisone 40 mg   Diamox 500 BID   Valtrex  Ying Chambers COT 12:35 PM April 30, 2024

## 2024-04-30 NOTE — LETTER
4/30/2024       RE: Power Blanco  [address]  [place] [address]     Dear Colleague,    Thank you for referring your patient, Power Blanco, to the [facility] [place] at [facility]. Please see a copy of my visit note below.    Chief Complaint/Presenting Concern: Uveitis follow-up.    Interval History of Present Ocular Illness:  Power Blanco is a 39 year old patient who returns for follow up of his acute anterior uveitis and ocular hypertension of the right eye.  We last saw each other on April 24, 2024 at which time inflammation was improving blood pressure was elevated on medications less frequently.  We discussed the importance of medications for both inflammation and pressure control and schedule a follow-up 2 days later which was unable to be attended.    Today, Power reports things are getting better. He had a rough eye night a few nights ago on his birthday when he was at a concert and he had irritation to the eye from things in the event [place]. Things got better by the next day and have been better since.    Interval Updates to Medical/Family/Social History:  No changes    Relevant Review of Systems Updates:  No headaches, no nausea    Current eye related medications    Right eye only: Prednisolone every 2 hours right eye Brimonidine 3x/day right eye, Cosopt (dark blue top) 2x/day right eye,  Latanoprost at bedtime right eye, no drops left eye   Pills: Diamox 500 mg twice daily, Prednisone 40 mg daily, Valacyclovir 1000 mg three times daily    Retina/Uveitis Imaging:   Slit lamp photo right eye April 30, 2024: Improving corneal edema, taller layered mixed color hyphema    Ultrasound biomicroscopy right eye 4/30/2024: Clear cornea. More uniformly hypoechogenic material now more like a ball in the AC. Iris flatter now less anteriorly bowed. No increasing enlargement of ciliary processes    Assessment:     1. Acute anterior  uveitis of right eye  2. Hyphema of right eye  3. Hypopyon of right eye  The inflammation is slightly improved but the red/white cells are layering inferiorly     4. Acute angle-closure glaucoma of right eye  IOP 33    5. Angle-closure glaucoma suspect of left eye  IOP 18    Plan/Recommendations:    Discussed findings with patient and his Wife and Dr. Wolf. The inflammation is improving even on less prednisone with more layering of hyphema/hypopyon. There is no central corneal edema but some inferiorly. View to retina is improving.   We recommended an ultrasound of the middle segment of the eye which showed iris contour flattening and material is now more layering in the anterior chamber and the iris contour is flatter without masses nor new abnormalities of the ciliary processes. As below, we will try to reduce inflammation treatment to see if inflammation can continue to improve but help lower pressure.  Eye pressure is 33, 18. Dr. Wolf kindly came by to talk with and look at Power's right eye. They discussed improving but persistent eye inflammation and considering surgery but with less urgency as eye pressure improved. There may be ultimately be a consideration of cataract removal and opening the angle and possible anterior chamber tap with check for viruses at that time. There may be a need for tube shunt later, but this might be best later if needed after other surgery.  Taper Prednisone to 30 mg daily.  Reduce Valtrex to 1000 mg now just twice a day.  Reduce Prednisolone drop to 6x/day right eye.   Continue Diamox 500 mg twice daily.  Continue these drops Right eye only: Brimonidine 3x/day right eye, Cosopt (dark blue top) try for 3x/day right eye, Latanoprost at bedtime right eye.  No drops needed left eye.  No other treatments needed.    RTC Dr. Wolf in 2 weeks on a Tuesday/FridayHernando same day no dilation, no testing    Physician Attestation    Attending Physician Attestation:   Complete documentation of historical and exam elements from today's encounter can be found in the full encounter summary report (not reduplicated in this progress note). I personally obtained the chief complaint(s) and history of present illness. I confirmed and edited as necessary the review of systems, past medical/surgical history, family history, social history, and examination findings as documented by others; and I examined the patient myself. I personally reviewed the relevant tests, images, and reports as documented above. I formulated and edited as necessary the assessment and plan and discussed the findings and management plan with the patient and family members present at the time of this visit.  Devon Villagomez M.D., Uveitis and Medical Retina, April 30, 2024         Again, thank you for allowing me to participate in the care of your patient.      Sincerely,    Devon Villagomez MD  [facility] Dept of Ophthalmology  Uveitis and Medical Retina

## 2024-04-30 NOTE — PATIENT INSTRUCTIONS
Taper prednisone to 30 mg daily  Reduce Valtrex to 1000 mg now just twice a day  Reduce prednisolone drop to 6x/day right eye   Continue Diamox 500 mg twice daily,   Continue these drops Right eye only: Brimonidine 3x/day right eye, Cosopt (dark blue top) try for 3x/day right eye, Latanoprost at bedtime right eye,  No drops needed left eye   No other treatments needed

## 2024-04-30 NOTE — PROGRESS NOTES
Chief Complaint/Presenting Concern: Uveitis follow-up    Interval History of Present Ocular Illness:  Power Blanco is a 39 year old patient who returns for follow up of his acute anterior uveitis and ocular hypertension of the right eye.  We last saw each other on April 24, 2024 at which time inflammation was improving blood pressure was elevated on medications less frequently.  We discussed the importance of medications for both inflammation and pressure control and schedule a follow-up 2 days later which was unable to be attended.    Today, Power reports things are getting better. He had a rough eye night a few nights ago on his birthday when he was at a concert and he had irritation to the eye from things in the event [place]. Things got better by the next day and have been better since.    Interval Updates to Medical/Family/Social History:  No changes    Relevant Review of Systems Updates:  No headaches, no nausea    Current eye related medications    Right eye only: Prednisolone every 2 hours right eye Brimonidine 3x/day right eye, Cosopt (dark blue top) 2x/day right eye,  Latanoprost at bedtime right eye, no drops left eye   Pills: Diamox 500 mg twice daily, Prednisone 40 mg daily, Valacyclovir 1000 mg three times daily    Retina/Uveitis Imaging:   Slit lamp photo right eye April 30, 2024: Improving corneal edema, taller layered mixed color hyphema    Ultrasound biomicroscopy right eye 4/30/2024: Clear cornea. More uniformly hypoechogenic material now more like a ball in the AC. Iris flatter now less anteriorly bowed. No increasing enlargement of ciliary processes    Assessment:     1. Acute anterior uveitis of right eye  2. Hyphema of right eye  3. Hypopyon of right eye  The inflammation is slightly improved but the red/white cells are layering inferiorly     4. Acute angle-closure glaucoma of right eye  IOP 33    5. Angle-closure glaucoma suspect of left eye  IOP 18    Plan/Recommendations:    Discussed  findings with patient and his Wife and Dr. Wolf. The inflammation is improving even on less prednisone with more layering of hyphema/hypopyon. There is no central corneal edema but some inferiorly. View to retina is improving.   \We recommended an ultrasound of the middle segment of the eye which showed iris contour flattening and material is now more layering in the anterior chamber and the iris contour is flatter without masses nor new abnormalities of the ciliary processes. As below, we will try to reduce inflammation treatment to see if inflammation can continue to improve but help lower pressure  Eye pressure is 33, 18. Dr. Wolf kindly came by to talk with and look at Power's right eye. They discussed improving but persistent eye inflammation and considering surgery but with less urgency as eye pressure improved. There may be ultimately be a consideration of cataract removal and opening the angle and possible anterior chamber tap with check for viruses at that time. There may be a need for tube shunt later, but this might be best later if needed after other surgery.  Taper prednisone to 30 mg daily  Reduce Valtrex to 1000 mg now just twice a day  Reduce prednisolone drop to 6x/day right eye   Continue Diamox 500 mg twice daily,   Continue these drops Right eye only: Brimonidine 3x/day right eye, Cosopt (dark blue top) try for 3x/day right eye, Latanoprost at bedtime right eye,  No drops needed left eye   No other treatments needed    RTC Dr. Wolf in 2 weeks on a Tuesday/FridayHernando same day no dilation, no testing    Physician Attestation     Attending Physician Attestation:  Complete documentation of historical and exam elements from today's encounter can be found in the full encounter summary report (not reduplicated in this progress note). I personally obtained the chief complaint(s) and history of present illness. I confirmed and edited as necessary the review of systems, past  medical/surgical history, family history, social history, and examination findings as documented by others; and I examined the patient myself. I personally reviewed the relevant tests, images, and reports as documented above. I formulated and edited as necessary the assessment and plan and discussed the findings and management plan with the patient and family members present at the time of this visit.  Devon Villagomez M.D., Uveitis and Medical Retina, April 30, 2024

## 2024-04-30 NOTE — NURSING NOTE
Chief Complaints and History of Present Illnesses   Patient presents with    Post Op (Ophthalmology) Right Eye     Chief Complaint(s) and History of Present Illness(es)       Post Op (Ophthalmology) Right Eye              Laterality: right eye    Onset: months ago    Quality: States va is the same since last visit      Associated symptoms: tearing and photophobia.  Negative for dryness, redness, flashes and floaters    Treatments tried: eye drops    Pain scale: 0/10              Comments    S/P CPC right eye 3/31/24  Prednisolone q2h right eye   Brimonidine 3x/day right eye  Cosopt (dark blue top) 3x/day right eye  Latanoprost at bedtime right eye  Prednisone 40 mg   Diamox 500 BID   Valtrex  Ying Chambers COT 12:35 PM April 30, 2024

## 2024-05-10 ENCOUNTER — APPOINTMENT (OUTPATIENT)
Dept: ADMINISTRATIVE | Facility: CLINIC | Age: 39
End: 2024-05-10
Payer: MEDICAID

## 2024-05-13 NOTE — PROGRESS NOTES
Patient was seen with Dr. Villagomez on this day. This encounter was opened by error. Dr. Wolf discussed the case with Dr. Villagomez, refer to note by Dr. Villagomez on this day.

## 2024-05-19 ENCOUNTER — HEALTH MAINTENANCE LETTER (OUTPATIENT)
Age: 39
End: 2024-05-19

## 2024-05-22 ENCOUNTER — MYC REFILL (OUTPATIENT)
Dept: OPHTHALMOLOGY | Facility: CLINIC | Age: 39
End: 2024-05-22
Payer: MEDICAID

## 2024-05-22 DIAGNOSIS — H20.00 ACUTE ANTERIOR UVEITIS OF RIGHT EYE: ICD-10-CM

## 2024-05-23 RX ORDER — VALACYCLOVIR HYDROCHLORIDE 1 G/1
1000 TABLET, FILM COATED ORAL 2 TIMES DAILY
Qty: 30 TABLET | Refills: 0 | Status: SHIPPED | OUTPATIENT
Start: 2024-05-23 | End: 2024-06-03

## 2024-05-23 NOTE — TELEPHONE ENCOUNTER
Called and spoke to [place]     Confirmed appointment on 6/6 at 8 am and 10 am with Dr. Wolf / Dr. Villagomez     Appointment information will be on my chart     Nery Barksdale Communication Facilitator on 5/23/2024 at 9:57 AM

## 2024-06-03 ENCOUNTER — MYC REFILL (OUTPATIENT)
Dept: OPHTHALMOLOGY | Facility: CLINIC | Age: 39
End: 2024-06-03
Payer: MEDICAID

## 2024-06-03 DIAGNOSIS — H20.00 ACUTE ANTERIOR UVEITIS OF RIGHT EYE: ICD-10-CM

## 2024-06-03 DIAGNOSIS — H40.211 ACUTE ANGLE-CLOSURE GLAUCOMA OF RIGHT EYE: ICD-10-CM

## 2024-06-03 RX ORDER — ACETAZOLAMIDE 500 MG/1
500 CAPSULE, EXTENDED RELEASE ORAL DAILY
Qty: 60 CAPSULE | Refills: 1 | OUTPATIENT
Start: 2024-06-03

## 2024-06-03 RX ORDER — VALACYCLOVIR HYDROCHLORIDE 1 G/1
1000 TABLET, FILM COATED ORAL 2 TIMES DAILY
Qty: 30 TABLET | Refills: 0 | OUTPATIENT
Start: 2024-06-03

## 2024-06-03 NOTE — TELEPHONE ENCOUNTER
Rx sent May 23rd with and should have enough til visit in 3 days.    Left message on pt voicemail to call triage number if needing additional medication.    Arik Rangel RN 10:02 AM 06/03/24

## 2024-06-03 NOTE — TELEPHONE ENCOUNTER
Rx sent in April with refill and should have enough til visit in 3 days.    Left message on pt voicemail to call triage number if needing additional medication.    Arik Rangel RN 10:02 AM 06/03/24

## 2024-06-06 ENCOUNTER — OFFICE VISIT (OUTPATIENT)
Dept: OPHTHALMOLOGY | Facility: CLINIC | Age: 39
End: 2024-06-06
Attending: OPHTHALMOLOGY
Payer: MEDICAID

## 2024-06-06 DIAGNOSIS — H40.032: ICD-10-CM

## 2024-06-06 DIAGNOSIS — H57.04 MYDRIASIS: ICD-10-CM

## 2024-06-06 DIAGNOSIS — H40.2214 CHRONIC ANGLE-CLOSURE GLAUCOMA OF RIGHT EYE, INDETERMINATE STAGE: Primary | ICD-10-CM

## 2024-06-06 DIAGNOSIS — H21.01 HYPHEMA OF RIGHT EYE: ICD-10-CM

## 2024-06-06 DIAGNOSIS — Z79.899 HIGH RISK MEDICATION USE: ICD-10-CM

## 2024-06-06 DIAGNOSIS — H20.00 ACUTE ANTERIOR UVEITIS OF RIGHT EYE: Primary | ICD-10-CM

## 2024-06-06 DIAGNOSIS — H20.051 HYPOPYON OF RIGHT EYE: ICD-10-CM

## 2024-06-06 DIAGNOSIS — H40.211 ACUTE ANGLE-CLOSURE GLAUCOMA OF RIGHT EYE: ICD-10-CM

## 2024-06-06 PROCEDURE — 999N000103 HC STATISTIC NO CHARGE FACILITY FEE

## 2024-06-06 PROCEDURE — 99214 OFFICE O/P EST MOD 30 MIN: CPT | Performed by: OPHTHALMOLOGY

## 2024-06-06 PROCEDURE — 99024 POSTOP FOLLOW-UP VISIT: CPT | Mod: GC | Performed by: OPHTHALMOLOGY

## 2024-06-06 PROCEDURE — G0463 HOSPITAL OUTPT CLINIC VISIT: HCPCS | Performed by: OPHTHALMOLOGY

## 2024-06-06 RX ORDER — VALACYCLOVIR HYDROCHLORIDE 1 G/1
1000 TABLET, FILM COATED ORAL 2 TIMES DAILY
Qty: 60 TABLET | Refills: 1 | Status: SHIPPED | OUTPATIENT
Start: 2024-06-06 | End: 2024-09-23

## 2024-06-06 RX ORDER — ACETAZOLAMIDE 250 MG/1
250 TABLET ORAL 2 TIMES DAILY
Qty: 60 TABLET | Refills: 2 | Status: SHIPPED | OUTPATIENT
Start: 2024-06-06 | End: 2024-06-28

## 2024-06-06 ASSESSMENT — VISUAL ACUITY
METHOD: SNELLEN - LINEAR
OD_SC: 20/100
OD_PH_SC: 20/50
OD_SC: 20/100
OS_SC: 20/20
OS_SC+: -1
OD_PH_SC: 20/50
OS_SC+: -1
OS_SC: 20/20
METHOD: SNELLEN - LINEAR

## 2024-06-06 ASSESSMENT — CONF VISUAL FIELD
OD_INFERIOR_NASAL_RESTRICTION: 0
OS_NORMAL: 1
OD_INFERIOR_TEMPORAL_RESTRICTION: 0
OS_INFERIOR_TEMPORAL_RESTRICTION: 0
OD_SUPERIOR_NASAL_RESTRICTION: 0
OD_SUPERIOR_NASAL_RESTRICTION: 0
METHOD: COUNTING FINGERS
OD_NORMAL: 1
OD_NORMAL: 1
OS_INFERIOR_NASAL_RESTRICTION: 0
OS_SUPERIOR_NASAL_RESTRICTION: 0
OS_SUPERIOR_NASAL_RESTRICTION: 0
OD_SUPERIOR_TEMPORAL_RESTRICTION: 0
OS_NORMAL: 1
OD_SUPERIOR_TEMPORAL_RESTRICTION: 0
OS_INFERIOR_TEMPORAL_RESTRICTION: 0
OS_SUPERIOR_TEMPORAL_RESTRICTION: 0
METHOD: COUNTING FINGERS
OD_INFERIOR_TEMPORAL_RESTRICTION: 0
OD_INFERIOR_NASAL_RESTRICTION: 0
OS_INFERIOR_NASAL_RESTRICTION: 0
OS_SUPERIOR_TEMPORAL_RESTRICTION: 0

## 2024-06-06 ASSESSMENT — TONOMETRY
OD_IOP_MMHG: 18
IOP_METHOD: APPLANATION
OS_IOP_MMHG: 20
IOP_METHOD: APPLANATION
OS_IOP_MMHG: 20
OD_IOP_MMHG: 15
OD_IOP_MMHG: 18
IOP_METHOD: APPLANATION

## 2024-06-06 ASSESSMENT — EXTERNAL EXAM - LEFT EYE
OS_EXAM: NORMAL
OS_EXAM: NORMAL

## 2024-06-06 ASSESSMENT — CUP TO DISC RATIO
OS_RATIO: 0.4
OD_RATIO: 0.5
OD_RATIO: 0.5
OS_RATIO: 0.4

## 2024-06-06 ASSESSMENT — SLIT LAMP EXAM - LIDS
COMMENTS: NORMAL
COMMENTS: NORMAL

## 2024-06-06 ASSESSMENT — EXTERNAL EXAM - RIGHT EYE
OD_EXAM: NORMAL
OD_EXAM: NORMAL

## 2024-06-06 NOTE — PATIENT INSTRUCTIONS
Recommend additional testing: BMP to check on kidney function while on Valtrex.  Continue these medications unchanged: Valtrex 1000 mg twice daily, Diamox 250 mg twice daily   For the Prednisone pills, please continue  30 mg daily through 6/30/24, then  on 7/1/24, go down to 2 pills (20 mg) Each AM until next time  No urgent need for pressure lowering drops, but should be considered if there is pain  Okay to use prednisolone drops right eye as needed  No drops for left eye

## 2024-06-06 NOTE — PROGRESS NOTES
Chief Complaint/Presenting Concern: Postoperative visit     History of Present Illness:   Power Blanco is a 38 year old patient who presents for evaluation of angle closure right eye. His initial presentation in March 2024 was concerning for sildenafil-related angle closure. He reports that in the past he has had similar small episodes of blurry vision and eye pain after using sildenafil. He initially responded to diamox and topical IOP medication. Subsequently an LPI was performed which was unsuccessful 3/26/24.    3/31/24 his IOP spiked and he underwent CPC right eye.    Today 6/6/24: patient states around April 6-7, stopped glaucoma drops due to pain after surgery. He continue everything prednisolone, and vision cleared up. He also took oral medications (valtrex, diamox, and valtrex). Since his last visit with April 30th with Dr. Villagomez, he attempted restarting the glaucoma drops but it caused blurry vision so he stopped. He has also had issues with insurance and was paying for drops and medications out of pocket and expenses were limiting compliance. He now has insurance and was able to restart drops. But had not been using medication for a week leading up to this appointment.     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  Year diagnosis  Previous glaucoma surgery/laser   -3/20/24 unsuccessful LPI right eye, successful left eye    -CPC right eye 3/31/24   Maximum intraocular pressure 53/42  Current meds: see below  Family history: negative  CCT: At future visit  Gonio: right eye with 360 synechial closure, left eye with 270 appositional closure, 90 synechial  Refractive status: Reports no glasses wear as a child  Trauma history: positive - corneal abrasion right eye ~2022  Steroid exposure: negative  Vasospastic disease: Migrane/Raynaud phenomenon: negative  A past  hemodynamic crisis or Low BP:: negative  Meds AEs/intolerance: None  PMHx: Positive for: None Negative for : Asthma and respiratory problems/Cardiac/Renal/Kidney stones/Sulfa Allergy  Anticoagulants: None  Prior testing:  B-scan right eye :  Retina flat and attached, no gross VH  Optos each eye: Lopez dusty right eye   OCT mac: wnl each eye     Additional Ocular History: None    Plan/Recommendations:  Discussed findings with patient. 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 There is likely a component of response to sildenafil. IOP was refractory to LPI and medical management  S/p TSCPC 3/31/24, significant AC reaction postop, possibly a component of ciliary body bleed   Poor compliance with glaucoma drops, and pt preference to not be on glaucoma drops. Given adequate pressure, okay to hold for now. LPI not visible. Poor tolerance of gonioscopy, but brief views of angles he is closed. Will start decreasing Diamox dose.   Decrease Diamox to 250 mg BID   Defer to Dr. Villagomez for prednisolone drop regimen (to see him today)  Continue Prednisone as per Dr. Villagomez, seeing him today   Counseled return/RD precautions  Discussed posterior synechiae and cataract today and eventual need for cataract extraction. Will work with Dr. Villagomez to address inflammation and timing for surgery. Higher risk of IOP spike due to closed angles and pupil block.     RTC 1 month, [facility], IOP     Calvin Sabillon MD  PGY-3 Ophthalmology      Physician Attestation     Attending Physician Attestation:  Complete documentation of historical and exam elements from today's encounter can be found in the full encounter summary report (not reduplicated in this progress note). I personally obtained the chief complaint(s) and history of present illness. I confirmed and edited as necessary the review of systems, past medical/surgical history, family history, social history, and examination findings  as documented by others; and I examined the patient myself. I personally reviewed the relevant tests, images, and reports as documented above. I formulated and edited as necessary the assessment and plan and discussed the findings and management plan with the patient and any family members present at the time of the visit.  Milton Wolf M.D., Glaucoma, June 6, 2024

## 2024-06-06 NOTE — PROGRESS NOTES
Chief Complaint/Presenting Concern:  Uveitis follow up    Interval History of Present Ocular Illness:  Power Blanco is a 39 year old patient who returns for follow up of his uveitis. We last saw each other on 4/30/24 at which time things were improving and view to the retina. Power had blurriness when using the blue top and purple top drops that night and has not used them since.    Power has only used the prednisolone drops a few times since. He has been able to use pills regularly except for a few days last week. Dr. Wolf today felt the pressure was acceptable and did discuss surgery when inflammation better.     Interval Updates to Medical/Family/Social History:  No major changes. Does now have insurance    Relevant Review of Systems Updates:  Some acid reflux symptoms     Current eye related medications: No purple or blue top, prednisolone occasionally right eye, Valtrex 1000 mg twice daily, Acetazolamide (Diamox) pill 250 mg twice daily, Prednisone 30 mg each morning, artificial tears    Retina/Uveitis Imaging: None today    Assessment:     1. Acute anterior uveitis of right eye  Still active but improving    2. Hyphema of right eye  3. Hypopyon of right eye  No hypopyon and no hyphema     4. Acute angle-closure glaucoma of right eye  5. Mydriasis - Right Eye  IOP 18     6. Angle-closure glaucoma suspect of left eye  LPI Patent, IOP 20    7. High risk medication use  Prednisone 30 mg daily, Valtrex 1000 mg twice daily, Diamox 250 mg twice daily     Plan/Recommendations:    Discussed findings with patient and his Wife. Even without drops regularly for the last month, the pressure and inflammation are better on essentially pills without drops. Reasonable to consider most things unchanged. We discussed value of drops for pressure lowering and inflammation but okay to continue pills as these seem to be helping and are well tolerated  One new feature today is pupil is now large and muscles are not moving  the iris right eye. This can sometimes happen with severe uveitis or viral inflammation. We will keep valtrex  Agree that waiting on surgery until inflammation improves is reasonable given pressure is lower now  Recommend additional testing: BMP to check on kidney function while on Valtrex.  Continue these medications unchanged: Valtrex 1000 mg twice daily, Diamox 250 mg twice daily   For the Prednisone pills, please continue  30 mg daily through 6/30/24, then  on 7/1/24, go down to 2 pills (20 mg) Each AM until next time  No urgent need for pressure lowering drops, but should be considered if there is pain  Okay to use prednisolone drops right eye as needed  No drops for left eye     RTC 1 month same day as Hernando Wolf, no dilation, no testing     Physician Attestation     Attending Physician Attestation:  Complete documentation of historical and exam elements from today's encounter can be found in the full encounter summary report (not reduplicated in this progress note). I personally obtained the chief complaint(s) and history of present illness. I confirmed and edited as necessary the review of systems, past medical/surgical history, family history, social history, and examination findings as documented by others; and I examined the patient myself. I personally reviewed the relevant tests, images, and reports as documented above. I formulated and edited as necessary the assessment and plan and discussed the findings and management plan with the patient and family members present at the time of this visit.  Devon Villagomez M.D., Uveitis and Medical Retina, June 6, 2024 '

## 2024-06-06 NOTE — LETTER
6/6/2024       RE: Power Blanco  [address]  [place] [address]     Dear Colleague,    Thank you for referring your patient, Power Blanco, to the [facility] [place] at [facility]. Please see a copy of my visit note below.    Chief Complaint/Presenting Concern:  Uveitis follow up.    Interval History of Present Ocular Illness:  Power Blanco is a 39 year old patient who returns for follow up of his uveitis. We last saw each other on 4/30/24 at which time things were improving and view to the retina. Power had blurriness when using the blue top and purple top drops that night and has not used them since.    Power has only used the prednisolone drops a few times since. He has been able to use pills regularly except for a few days last week. Dr. Wolf today felt the pressure was acceptable and did discuss surgery when inflammation better.     Interval Updates to Medical/Family/Social History:  No major changes. Does now have insurance    Relevant Review of Systems Updates:  Some acid reflux symptoms     Current eye related medications: No purple or blue top, prednisolone occasionally right eye, Valtrex 1000 mg twice daily, Acetazolamide (Diamox) pill 250 mg twice daily, Prednisone 30 mg each morning, artificial tears.    Retina/Uveitis Imaging: None today    Assessment:     1. Acute anterior uveitis of right eye  Still active but improving    2. Hyphema of right eye  3. Hypopyon of right eye  No hypopyon and no hyphema     4. Acute angle-closure glaucoma of right eye  5. Mydriasis - Right Eye  IOP 18     6. Angle-closure glaucoma suspect of left eye  LPI Patent, IOP 20    7. High risk medication use  Prednisone 30 mg daily, Valtrex 1000 mg twice daily, Diamox 250 mg twice daily     Plan/Recommendations:    Discussed findings with patient and his Wife. Even without drops regularly for the last month, the pressure and inflammation  are better on essentially pills without drops. Reasonable to consider most things unchanged. We discussed value of drops for pressure lowering and inflammation but okay to continue pills as these seem to be helping and are well tolerated.  One new feature today is pupil is now large and muscles are not moving the iris right eye. This can sometimes happen with severe uveitis or viral inflammation. We will keep valtrex.  Agree that waiting on surgery until inflammation improves is reasonable given pressure is lower now.  Recommend additional testing: BMP to check on kidney function while on Valtrex.  Continue these medications unchanged: Valtrex 1000 mg twice daily, Diamox 250 mg twice daily   For the Prednisone pills, please continue  30 mg daily through 6/30/24, then  on 7/1/24, go down to 2 pills (20 mg) Each AM until next time.  No urgent need for pressure lowering drops, but should be considered if there is pain.  Okay to use Prednisolone drops right eye as needed.  No drops for left eye.     RTC 1 month same day as Hernando Wolf, no dilation, no testing     Physician Attestation    Attending Physician Attestation:  Complete documentation of historical and exam elements from today's encounter can be found in the full encounter summary report (not reduplicated in this progress note). I personally obtained the chief complaint(s) and history of present illness. I confirmed and edited as necessary the review of systems, past medical/surgical history, family history, social history, and examination findings as documented by others; and I examined the patient myself. I personally reviewed the relevant tests, images, and reports as documented above. I formulated and edited as necessary the assessment and plan and discussed the findings and management plan with the patient and family members present at the time of this visit.  eDvon Villagomez M.D., Uveitis and Medical Retina, June 6, 2024     Again, thank you for  allowing me to participate in the care of your patient.      Sincerely,    Devon Villagomez MD  [facility] Dept of Ophthalmology  Uveitis and Medical Retina

## 2024-06-28 ENCOUNTER — OFFICE VISIT (OUTPATIENT)
Dept: OPHTHALMOLOGY | Facility: CLINIC | Age: 39
End: 2024-06-28
Attending: PHYSICAL MEDICINE & REHABILITATION
Payer: MEDICAID

## 2024-06-28 DIAGNOSIS — H40.211 ACUTE ANGLE-CLOSURE GLAUCOMA OF RIGHT EYE: ICD-10-CM

## 2024-06-28 DIAGNOSIS — H40.032: ICD-10-CM

## 2024-06-28 DIAGNOSIS — H57.04 MYDRIASIS: ICD-10-CM

## 2024-06-28 DIAGNOSIS — H20.00 ACUTE ANTERIOR UVEITIS OF RIGHT EYE: Primary | ICD-10-CM

## 2024-06-28 DIAGNOSIS — Z79.899 HIGH RISK MEDICATION USE: ICD-10-CM

## 2024-06-28 PROCEDURE — 99214 OFFICE O/P EST MOD 30 MIN: CPT | Performed by: OPHTHALMOLOGY

## 2024-06-28 PROCEDURE — G0463 HOSPITAL OUTPT CLINIC VISIT: HCPCS | Performed by: OPHTHALMOLOGY

## 2024-06-28 RX ORDER — ACETAZOLAMIDE 250 MG/1
250 TABLET ORAL 2 TIMES DAILY
Qty: 60 TABLET | Refills: 2 | Status: SHIPPED | OUTPATIENT
Start: 2024-06-28 | End: 2024-09-22

## 2024-06-28 RX ORDER — PREDNISONE 10 MG/1
TABLET ORAL
Qty: 100 TABLET | Refills: 1 | Status: SHIPPED | OUTPATIENT
Start: 2024-06-28

## 2024-06-28 ASSESSMENT — CUP TO DISC RATIO
OD_RATIO: 0.5
OS_RATIO: 0.4

## 2024-06-28 ASSESSMENT — CONF VISUAL FIELD
OS_INFERIOR_NASAL_RESTRICTION: 0
OD_INFERIOR_TEMPORAL_RESTRICTION: 0
OS_NORMAL: 1
OD_SUPERIOR_NASAL_RESTRICTION: 0
OD_SUPERIOR_TEMPORAL_RESTRICTION: 0
OS_SUPERIOR_NASAL_RESTRICTION: 0
METHOD: COUNTING FINGERS
OS_INFERIOR_TEMPORAL_RESTRICTION: 0
OD_NORMAL: 1
OD_INFERIOR_NASAL_RESTRICTION: 0
OS_SUPERIOR_TEMPORAL_RESTRICTION: 0

## 2024-06-28 ASSESSMENT — EXTERNAL EXAM - RIGHT EYE: OD_EXAM: NORMAL

## 2024-06-28 ASSESSMENT — VISUAL ACUITY
OS_SC+: -1
OD_PH_SC: 20/40
METHOD: SNELLEN - LINEAR
OD_SC: 20/70
OS_SC: 20/20
OD_SC+: +1
OD_PH_SC+: -1

## 2024-06-28 ASSESSMENT — EXTERNAL EXAM - LEFT EYE: OS_EXAM: NORMAL

## 2024-06-28 ASSESSMENT — TONOMETRY
OD_IOP_MMHG: 20
IOP_METHOD: TONOPEN
OS_IOP_MMHG: 25

## 2024-06-28 ASSESSMENT — SLIT LAMP EXAM - LIDS: COMMENTS: NORMAL

## 2024-06-28 NOTE — LETTER
6/28/2024       RE: Power Blanco  [address]  [place] [address]     Dear Colleague,    Thank you for referring your patient, Power Blanco, to the [facility] [place] at [facility]. Please see a copy of my visit note below.    Chief Complaint/Presenting Concern:  Uveitis follow up.    Interval History of Present Ocular Illness:  Power Blanco is a 39 year old patient who returns for follow up of uveitis. We last saw each other on 6/6/2024 at that time things were improving and we elected to continue treatment with drops.     Since last visit, Mr. Blanco states that things have been off an on. This week in particular he woke up with a lot of blurry vision associated with pain above the right eye. He states that this pain is different from his eye pressure pain in that it is located at his right brow and is more mild. He is unsure if it is associated with his light sensitivity although this gets better as the day goes on. 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 He is concerned that this may be due to inflammation.  No floaters or flashing lights. There is some blurriness of the left eye when covering the right eye.     Interval Updates to Medical/Family/Social History:  As below    Relevant Review of Systems Updates:    Feels very fatigued, but this is stable.  Has had a lot more acid reflux/heart burn. Omeprazole 20 mg daily has not really been helping    Laboratory Testing: No new labs.     Current eye related medications:   Drops: No purple or blue top, prednisolone occasionally right eye (has not used it since last visit),artificial tears as needed.  Pills: Valtrex 1000 mg twice daily, Acetazolamide (Diamox) pill 250 mg twice daily, Prednisone 30 mg each morning, Omeprazole 20 mg as  needed    Retina/Uveitis Imaging: No imaging today    Assessment:     1. Acute anterior uveitis of right eye  Still active but improved     2. Hyphema of right eye  3. Hypopyon of right eye  No hypopyon and no hyphema      4. Acute angle-closure glaucoma of right eye  5. Mydriasis - Right Eye  IOP  20     6. Angle-closure glaucoma suspect of left eye  LPI Patent, IOP 25     7. High risk medication use  Prednisone 30 mg daily, Valtrex 1000 mg twice daily, Diamox 250 mg twice daily     Plan/Recommendations:    Discussed findings with patient and Wife. Regarding the blurry vision in the right eye which is intermittent and mostly for near, there is mild  dryness, mild inflammation but stable eye pressure. We talked about the inflammation plan as below but also some other things to consider.  For the left eye, there is no inflammation, LPI Patent but eye pressure up at 25. Mr. Blanco is not interested in starting glaucoma drops due to visual changes. He feels comfortable continuing on pills only despite elevated pressures.  Pupil still fixed around 5 mm with minimal iris movement due to severe uveitis or viral inflammation. We will continue Valtrex.   Labs to be done after next appointment in a few weeks.   Continue these medications unchanged: Valtrex 1000 mg twice daily, Diamox 250 mg twice daily   For the Prednisone pills, please continue 30 mg daily through 6/30/24, then  on 7/1/24, go down to 2 pills (20 mg) Each AM until next time.  Okay to use prednisolone drops right eye as needed.  No drops for left eye for inflammation but discussed the value of pressure lowering drop for the left eye (such as purple Top Brimonidine).  You can try some over the counter drops for dryness such as Refresh or Systane.   You can try some +1.00D or +1.50D over the counter readers for reading or computer work to see if this might help some.   There may be a benefit to getting updated glasses later and possibly a light blocking contact  lens which might help light sensitivity.     RTC 7/26/24 as scheduled same day as Dr. Wolf, no dilation, no testing for JY    Labs on 7/26/24    Esperanza Chandler, MS4    Attending Physician Attestation:  Complete documentation of historical and exam elements from today's encounter can be found in the full encounter summary report (not reduplicated in this progress note). I reviewed the chief complaint(s) and history of present illness, and  confirmed and edited as necessary the review of systems, past medical/surgical history, family history, social history, and examination findings as documented by others and the Medical Student    I examined the patient myself, discussed the findings, reviewed all ancillary testing data and modified these results and reports along with the assessment and plan with the Medical Student. I agree with the note as detailed above.   Devon Villagomez M.D.  Uveitis and Medical Retina  June 28, 2024      Again, thank you for allowing me to participate in the care of your patient.      Sincerely,    Devon Villagomez MD  [facility] Dept of Ophthalmology  Uveitis and Medical Retina

## 2024-06-28 NOTE — PATIENT INSTRUCTIONS
Continue these medications unchanged: Valtrex 1000 mg twice daily, Diamox 250 mg twice daily   For the Prednisone pills, please continue 30 mg daily through 6/30/24, then  on 7/1/24, go down to 2 pills (20 mg) Each AM until next time  Okay to use prednisolone drops right eye as needed  No drops for left eye for inflammation but discussed the value of pressure lowering drop for the left eye (such as purple Top Brimonidine)  You can try some over the counter drops for dryness such as Refresh or Systane   You can try some +1.00D or +1.50D over the counter readers for reading or computer work to see if this might help some

## 2024-06-28 NOTE — PROGRESS NOTES
Chief Complaint/Presenting Concern:  Uveitis follow up    Interval History of Present Ocular Illness:  Power Blanco is a 39 year old patient who returns for follow up of uveitis. We last saw each other on 6/6/2024 at that time things were improving and we elected to continue treatment with drops.     Since last visit, Mr. Blanco states that things have been off an on. This week in particular he woke up with a lot of blurry vision associated with pain above the right eye. He states that this pain is different from his eye pressure pain in that it is located at his right brow and is more mild. He is unsure if it is associated with his light sensitivity although this gets better as the day goes on. 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 He is concerned that this may be due to inflammation.  No floaters or flashing lights. There is some blurriness of the left eye when covering the right eye.     Interval Updates to Medical/Family/Social History:  As below    Relevant Review of Systems Updates:    Feels very fatigued, but this is stable.  Has had a lot more acid reflux/heart burn. Omeprazole 20 mg daily has not really been helping    Laboratory Testing: No new labs.     Current eye related medications:   Drops: No purple or blue top, prednisolone occasionally right eye (has not used it since last visit),artificial tears as needed.  Pills: Valtrex 1000 mg twice daily, Acetazolamide (Diamox) pill 250 mg twice daily, Prednisone 30 mg each morning, Omeprazole 20 mg as needed    Retina/Uveitis Imaging: No imaging today    Assessment:     1. Acute anterior uveitis of right eye  Still active but improved     2. Hyphema of right eye  3. Hypopyon of right eye  No hypopyon and no hyphema      4. Acute angle-closure glaucoma of right eye  5. Mydriasis - Right Eye  IOP  20     6. Angle-closure  glaucoma suspect of left eye  LPI Patent, IOP 25     7. High risk medication use  Prednisone 30 mg daily, Valtrex 1000 mg twice daily, Diamox 250 mg twice daily     Plan/Recommendations:    Discussed findings with patient and Wife. Regarding the blurry vision in the right eye which is intermittent and mostly for near, there is mild  dryness, mild inflammation but stable eye pressure. We talked about the inflammation plan as below but also some other things to consider.  For the left eye, there is no inflammation, LPI Patent but eye pressure up at 25. Mr. Blanco is not interested in starting glaucoma drops due to visual changes. He feels comfortable continuing on pills only despite elevated pressures.  Pupil still fixed around 5 mm with minimal iris movement due to severe uveitis or viral inflammation. We will continue Valtrex.   Labs to be done after next appointment in a few weeks.   Continue these medications unchanged: Valtrex 1000 mg twice daily, Diamox 250 mg twice daily   For the Prednisone pills, please continue 30 mg daily through 6/30/24, then  on 7/1/24, go down to 2 pills (20 mg) Each AM until next time  Okay to use prednisolone drops right eye as needed  No drops for left eye for inflammation but discussed the value of pressure lowering drop for the left eye (such as purple Top Brimonidine)  You can try some over the counter drops for dryness such as Refresh or Systane   You can try some +1.00D or +1.50D over the counter readers for reading or computer work to see if this might help some   There may be a benefit to getting updated glasses later and possibly a light blocking contact lens which might help light sensitivity     RTC 7/26/24 as scheduled same day as Dr. Wolf, no dilation, no testing for JY    Labs on 7/26/24    Esperanza Chandler, MS4    Attending Physician Attestation:  Complete documentation of historical and exam elements from today's encounter can be found in the full encounter  summary report (not reduplicated in this progress note). I reviewed the chief complaint(s) and history of present illness, and  confirmed and edited as necessary the review of systems, past medical/surgical history, family history, social history, and examination findings as documented by others and the Medical Student    I examined the patient myself, discussed the findings, reviewed all ancillary testing data and modified these results and reports along with the assessment and plan with the Medical Student. I agree with the note as detailed above.   Devon Villagomez M.D.  Uveitis and Medical Retina  June 28, 2024

## 2024-06-28 NOTE — NURSING NOTE
COVID-19 COVID-19 COVID-19 COVID-19 COVID-19 COVID-19 Chief Complaints and History of Present Illnesses   Patient presents with    Uveitis Follow-Up     Chief Complaint(s) and History of Present Illness(es)       Uveitis Follow-Up              Laterality: right eye    Onset: gradual    Onset: months ago    Severity: moderate    Frequency: intermittently    Associated symptoms: dryness and photophobia.  Negative for redness, tearing, flashes and floaters    Pain scale: 0/10              Comments    Here for acute anterior uveitis of right eye  AT every day   Prednisone 30 mg  Valtrex  Diamox  Ying Chambers COT 11:10 AM June 28, 2024                       COVID-19 COVID-19 COVID-19 COVID-19 COVID-19 COVID-19 COVID-19 COVID-19 COVID-19 COVID-19 COVID-19 COVID-19 COVID-19 COVID-19 COVID-19 COVID-19 COVID-19 COVID-19 COVID-19 COVID-19 COVID-19 COVID-19 COVID-19 COVID-19 COVID-19

## 2024-07-04 ENCOUNTER — HOSPITAL ENCOUNTER (EMERGENCY)
Facility: CLINIC | Age: 39
Discharge: HOME | End: 2024-07-04
Attending: EMERGENCY MEDICINE | Admitting: EMERGENCY MEDICINE
Payer: MEDICAID

## 2024-07-04 VITALS
TEMPERATURE: 98.3 F | BODY MASS INDEX: 34.1 KG/M2 | OXYGEN SATURATION: 98 % | RESPIRATION RATE: 19 BRPM | HEART RATE: 50 BPM | DIASTOLIC BLOOD PRESSURE: 58 MMHG | HEIGHT: 68 IN | SYSTOLIC BLOOD PRESSURE: 100 MMHG | WEIGHT: 225 LBS

## 2024-07-04 DIAGNOSIS — E87.6 HYPOKALEMIA: ICD-10-CM

## 2024-07-04 DIAGNOSIS — R11.2 NAUSEA VOMITING AND DIARRHEA: ICD-10-CM

## 2024-07-04 DIAGNOSIS — R19.7 NAUSEA VOMITING AND DIARRHEA: ICD-10-CM

## 2024-07-04 LAB
ALBUMIN SERPL BCG-MCNC: 4.4 G/DL (ref 3.5–5.2)
ALP SERPL-CCNC: 42 U/L (ref 40–150)
ALT SERPL W P-5'-P-CCNC: 28 U/L (ref 0–70)
ANION GAP SERPL CALCULATED.3IONS-SCNC: 11 MMOL/L (ref 7–15)
AST SERPL W P-5'-P-CCNC: 23 U/L (ref 0–45)
ATRIAL RATE - MUSE: 53 BPM
BASOPHILS # BLD AUTO: 0 10E3/UL (ref 0–0.2)
BASOPHILS NFR BLD AUTO: 0 %
BILIRUB DIRECT SERPL-MCNC: 0.28 MG/DL (ref 0–0.3)
BILIRUB SERPL-MCNC: 1.5 MG/DL
BUN SERPL-MCNC: 16.2 MG/DL (ref 6–20)
CALCIUM SERPL-MCNC: 9.6 MG/DL (ref 8.6–10)
CHLORIDE SERPL-SCNC: 106 MMOL/L (ref 98–107)
CREAT SERPL-MCNC: 0.75 MG/DL (ref 0.67–1.17)
DEPRECATED HCO3 PLAS-SCNC: 23 MMOL/L (ref 22–29)
DIASTOLIC BLOOD PRESSURE - MUSE: 59 MMHG
EGFRCR SERPLBLD CKD-EPI 2021: >90 ML/MIN/1.73M2
EOSINOPHIL # BLD AUTO: 0 10E3/UL (ref 0–0.7)
EOSINOPHIL NFR BLD AUTO: 0 %
ERYTHROCYTE [DISTWIDTH] IN BLOOD BY AUTOMATED COUNT: 12.6 % (ref 10–15)
GLUCOSE SERPL-MCNC: 160 MG/DL (ref 70–99)
HCT VFR BLD AUTO: 39.6 % (ref 40–53)
HGB BLD-MCNC: 14 G/DL (ref 13.3–17.7)
IMM GRANULOCYTES # BLD: 0.1 10E3/UL
IMM GRANULOCYTES NFR BLD: 1 %
INTERPRETATION ECG - MUSE: NORMAL
LIPASE SERPL-CCNC: 27 U/L (ref 13–60)
LYMPHOCYTES # BLD AUTO: 0.8 10E3/UL (ref 0.8–5.3)
LYMPHOCYTES NFR BLD AUTO: 6 %
MCH RBC QN AUTO: 32.1 PG (ref 26.5–33)
MCHC RBC AUTO-ENTMCNC: 35.4 G/DL (ref 31.5–36.5)
MCV RBC AUTO: 91 FL (ref 78–100)
MONOCYTES # BLD AUTO: 0.9 10E3/UL (ref 0–1.3)
MONOCYTES NFR BLD AUTO: 7 %
NEUTROPHILS # BLD AUTO: 10.4 10E3/UL (ref 1.6–8.3)
NEUTROPHILS NFR BLD AUTO: 86 %
NRBC # BLD AUTO: 0 10E3/UL
NRBC BLD AUTO-RTO: 0 /100
P AXIS - MUSE: 43 DEGREES
PLATELET # BLD AUTO: 180 10E3/UL (ref 150–450)
POTASSIUM SERPL-SCNC: 3.2 MMOL/L (ref 3.4–5.3)
PR INTERVAL - MUSE: 204 MS
PROT SERPL-MCNC: 7 G/DL (ref 6.4–8.3)
QRS DURATION - MUSE: 102 MS
QT - MUSE: 420 MS
QTC - MUSE: 394 MS
R AXIS - MUSE: 56 DEGREES
RBC # BLD AUTO: 4.36 10E6/UL (ref 4.4–5.9)
SODIUM SERPL-SCNC: 140 MMOL/L (ref 135–145)
SYSTOLIC BLOOD PRESSURE - MUSE: 120 MMHG
T AXIS - MUSE: 27 DEGREES
VENTRICULAR RATE- MUSE: 53 BPM
WBC # BLD AUTO: 12.1 10E3/UL (ref 4–11)

## 2024-07-04 PROCEDURE — 96375 TX/PRO/DX INJ NEW DRUG ADDON: CPT

## 2024-07-04 PROCEDURE — 83690 ASSAY OF LIPASE: CPT | Performed by: EMERGENCY MEDICINE

## 2024-07-04 PROCEDURE — 82248 BILIRUBIN DIRECT: CPT | Performed by: EMERGENCY MEDICINE

## 2024-07-04 PROCEDURE — 80048 BASIC METABOLIC PNL TOTAL CA: CPT | Performed by: EMERGENCY MEDICINE

## 2024-07-04 PROCEDURE — 96361 HYDRATE IV INFUSION ADD-ON: CPT

## 2024-07-04 PROCEDURE — 93005 ELECTROCARDIOGRAM TRACING: CPT | Performed by: EMERGENCY MEDICINE

## 2024-07-04 PROCEDURE — 258N000003 HC RX IP 258 OP 636: Performed by: EMERGENCY MEDICINE

## 2024-07-04 PROCEDURE — 96374 THER/PROPH/DIAG INJ IV PUSH: CPT

## 2024-07-04 PROCEDURE — 250N000011 HC RX IP 250 OP 636: Performed by: EMERGENCY MEDICINE

## 2024-07-04 PROCEDURE — 85025 COMPLETE CBC W/AUTO DIFF WBC: CPT | Performed by: EMERGENCY MEDICINE

## 2024-07-04 PROCEDURE — 36415 COLL VENOUS BLD VENIPUNCTURE: CPT | Performed by: EMERGENCY MEDICINE

## 2024-07-04 PROCEDURE — 99284 EMERGENCY DEPT VISIT MOD MDM: CPT | Mod: 25

## 2024-07-04 PROCEDURE — 80053 COMPREHEN METABOLIC PANEL: CPT | Performed by: EMERGENCY MEDICINE

## 2024-07-04 RX ORDER — ONDANSETRON 4 MG/1
4 TABLET, ORALLY DISINTEGRATING ORAL EVERY 6 HOURS PRN
Qty: 20 TABLET | Refills: 0 | Status: SHIPPED | OUTPATIENT
Start: 2024-07-04

## 2024-07-04 RX ORDER — DIPHENHYDRAMINE HYDROCHLORIDE 50 MG/ML
25 INJECTION INTRAMUSCULAR; INTRAVENOUS ONCE
Status: COMPLETED | OUTPATIENT
Start: 2024-07-04 | End: 2024-07-04

## 2024-07-04 RX ORDER — DROPERIDOL 2.5 MG/ML
2.5 INJECTION, SOLUTION INTRAMUSCULAR; INTRAVENOUS ONCE
Status: COMPLETED | OUTPATIENT
Start: 2024-07-04 | End: 2024-07-04

## 2024-07-04 RX ORDER — ONDANSETRON 2 MG/ML
4 INJECTION INTRAMUSCULAR; INTRAVENOUS ONCE
Status: COMPLETED | OUTPATIENT
Start: 2024-07-04 | End: 2024-07-04

## 2024-07-04 RX ORDER — POTASSIUM CHLORIDE 1500 MG/1
20 TABLET, EXTENDED RELEASE ORAL 2 TIMES DAILY
Qty: 10 TABLET | Refills: 0 | Status: SHIPPED | OUTPATIENT
Start: 2024-07-04 | End: 2024-07-09

## 2024-07-04 RX ADMIN — DROPERIDOL 2.5 MG: 2.5 INJECTION, SOLUTION INTRAMUSCULAR; INTRAVENOUS at 20:35

## 2024-07-04 RX ADMIN — SODIUM CHLORIDE 1000 ML: 9 INJECTION, SOLUTION INTRAVENOUS at 20:31

## 2024-07-04 RX ADMIN — DIPHENHYDRAMINE HYDROCHLORIDE 25 MG: 50 INJECTION INTRAMUSCULAR; INTRAVENOUS at 21:13

## 2024-07-04 RX ADMIN — ONDANSETRON 4 MG: 2 INJECTION INTRAMUSCULAR; INTRAVENOUS at 20:33

## 2024-07-04 ASSESSMENT — ACTIVITIES OF DAILY LIVING (ADL)
ADLS_ACUITY_SCORE: 35
ADLS_ACUITY_SCORE: 35

## 2024-07-05 NOTE — DISCHARGE INSTRUCTIONS
You were seen in the emergency department for nausea vomiting diarrhea and abdominal pain.  You received some IV fluid and medication here and we are hopeful that symptoms will improve moving forward.  Your labs did reveal a slightly low potassium level and we are going to recommend a few days of replacement for this.  Your symptoms could be related to a viral gastrointestinal illness, most of these are fairly short-lived and should be improving over the next few days.  Sometimes abdominal cramping and vomiting can be related to marijuana use and we would recommend holding off on this until you can discuss things further with your primary doctor and until you are feeling completely better.  You can continue to use Zofran as needed for nausea and vomiting.  For the next few days you can also use your omeprazole twice a day for additional acid reflux control.  If you have other concerns like severe pain particularly in the right lower quadrant of the abdomen, inability to drink liquid, severe weakness, etc. we can reevaluate at any time in the emergency department.

## 2024-07-05 NOTE — ED TRIAGE NOTES
Patient arrives to the ER with c/o N/V/D and abd pain that started yesterday. He states that today he has been able to keep some fluids down today, but the abdominal pain has worsened.      Triage Assessment (Adult)       Row Name 07/04/24 2007          Triage Assessment    Airway WDL WDL        Respiratory WDL    Respiratory WDL WDL        Skin Circulation/Temperature WDL    Skin Circulation/Temperature WDL WDL        Cardiac WDL    Cardiac WDL WDL        Peripheral/Neurovascular WDL    Peripheral Neurovascular WDL WDL        Cognitive/Neuro/Behavioral WDL    Cognitive/Neuro/Behavioral WDL WDL

## 2024-07-05 NOTE — ED PROVIDER NOTES
EMERGENCY DEPARTMENT ENCOUNTER      NAME: Power Blanco  AGE: 39 year old male  YOB: 1985  MRN: [id number]  EVALUATION DATE & TIME: 2024  8:01 PM    PCP: No Ref-Primary, Physician    ED PROVIDER: Isaias Sinha M.D.      Chief Complaint   Patient presents with    Abdominal Pain    Nausea, Vomiting, & Diarrhea         FINAL IMPRESSION:  1. Nausea vomiting and diarrhea    2. Hypokalemia          ED COURSE & MEDICAL DECISION MAKIN:08 PM I met with the patient to obtain patient history and performed a physical exam. Discussed plan for ED work up including potential diagnostic studies and interventions.  9:06 PM Reassessed and updated patient with findings.  9:45 PM Reevaluated and updated the patient with findings. We discussed the plan for discharge and the patient is agreeable. Reviewed supportive cares, symptomatic treatment, outpatient follow up, and reasons to return to the Emergency Department. Patient to be discharged by ED RN.       39 year old male presents to the Emergency Department for evaluation of nausea vomiting diarrhea and abdominal pain.  Patient has a history of prior cholecystectomy and daily cannabis use.  He presents the emergency department with nausea vomiting and diarrhea for the last 24 hours.  His exam is reassuring and negative for any focal tenderness or peritoneal signs.  He underwent lab evaluations as below.  This revealed some mild hypokalemia and a mild leukocytosis, likely reactive.  I do think that based on the patient's history of daily cannabis use, diffuse abdominal cramping and vomiting alleviated by hot showers, cannabinoid hyperemesis could certainly be playing a role.  Otherwise symptoms could also be consistent with a gastroenteritis.  Based on reassuring exam and labs, no suspicion for acute intra-abdominal process like appendicitis, retained gallstone, etc. to require further imaging.  Patient received IV fluids and antiemetics as below.  He was  resting more comfortably on reevaluation with resolution of his nausea and subsequently able to tolerate a p.o. challenge.  Comfortable with plan for discharge with antiemetics and potassium replacement.  Return precautions reviewed.  Patient discharged in stable condition    At the conclusion of the encounter I discussed the results of all of the tests and the disposition. The questions were answered. The patient or family acknowledged understanding and was agreeable with the care plan.       Medical Decision Making  Obtained supplemental history:Supplemental history obtained?: No  Reviewed external records: External records reviewed?: No  Care impacted by chronic illness:Diabetes  Care significantly affected by social determinants of health:N/A  Did you consider but not order tests?: Work up considered but not performed and documented in chart, if applicable  Did you interpret images independently?: Independent interpretation of ECG and images noted in documentation, when applicable.  Consultation discussion with other provider:Did you involve another provider (consultant, , pharmacy, etc.)?: No  Discharge. I prescribed additional prescription strength medication(s) as charted. N/A.        MEDICATIONS GIVEN IN THE EMERGENCY:  Medications   sodium chloride 0.9% BOLUS 1,000 mL (0 mLs Intravenous Stopped 7/4/24 2136)   ondansetron (ZOFRAN) injection 4 mg (4 mg Intravenous $Given 7/4/24 2033)   droPERidol (INAPSINE) injection 2.5 mg (2.5 mg Intravenous $Given 7/4/24 2035)   diphenhydrAMINE (BENADRYL) injection 25 mg (25 mg Intravenous $Given 7/4/24 2113)       NEW PRESCRIPTIONS STARTED AT TODAY'S ER VISIT  Discharge Medication List as of 7/4/2024  9:37 PM        START taking these medications    Details   ondansetron (ZOFRAN ODT) 4 MG ODT tab Take 1 tablet (4 mg) by mouth every 6 hours as needed for nausea or vomiting, Disp-20 tablet, R-0, Local Print      potassium chloride ER (K-TAB) 20 MEQ CR tablet Take 1 tablet  (20 mEq) by mouth 2 times daily for 5 days, Disp-10 tablet, R-0, Local Print                =================================================================    HPI    Patient information was obtained from: patient     Use of : N/A         Power Blanco is a 39 year old male with a pertinent history of gallstones, DM2, recurrent RUQ abdominal pain who presents to this ED by personal vehicle for evaluation of abdominal pain, nausea, vomiting, diarrhea.    The patient presents to the ED for diaphoresis, body aches, vomiting, diarrhea, and diffuse abdominal pain starting yesterday, 07/03. The patient has has previously had his gallbladder removed and describes the pain as similar to the pain flare-ups he felt prior to removal. He states that the only way he can feel relief from the symptoms is when he is taking a hot shower. He is still currently in pain. He states that before 7 AM today, he has had x5 instances of vomiting and last night he had x4-5 instances of vomiting. He has had about x8-9 instances of diarrhea since the symptoms started yesterday. The patient states that they are a daily cannabis user although they did not use any yesterday (7/3/24). He has recently taken omeprazole for his symptoms with no relief.    The patient denies any urinary changes or alcohol use. Patient states no other complaints or concerns at this time.        REVIEW OF SYSTEMS   All systems reviewed and negative except as noted in HPI.    PAST MEDICAL HISTORY:  Past Medical History:   Diagnosis Date    Acute angle-closure glaucoma of right eye     Diabetes mellitus, type 2 (H)     Glaucoma (increased eye pressure)        PAST SURGICAL HISTORY:  Past Surgical History:   Procedure Laterality Date    CHOLECYSTECTOMY  12/21/2022    LASER DIODE CILIARY LESIONS Right 3/31/2024    Procedure: Cyclophotocoagulation;  Surgeon: Milton Wolf MD;  Location: [facility] OR           CURRENT MEDICATIONS:    No current facility-administered  medications for this encounter.     Current Outpatient Medications   Medication Sig Dispense Refill    ondansetron (ZOFRAN ODT) 4 MG ODT tab Take 1 tablet (4 mg) by mouth every 6 hours as needed for nausea or vomiting 20 tablet 0    potassium chloride ER (K-TAB) 20 MEQ CR tablet Take 1 tablet (20 mEq) by mouth 2 times daily for 5 days 10 tablet 0    acetaminophen (TYLENOL) 325 MG tablet Take 325-650 mg by mouth every 6 hours as needed for mild pain      acetaZOLAMIDE (DIAMOX) 250 MG tablet Take 1 tablet (250 mg) by mouth 2 times daily 60 tablet 2    brimonidine (ALPHAGAN) 0.2 % ophthalmic solution Place 1 drop into the right eye 3 times daily 15 mL 4    carboxymethylcellulose PF (CARBOXYMETHYLCELLULOSE SODIUM) 0.5 % ophthalmic solution Place 1 drop into both eyes 4 times daily as needed for dry eyes 60 each 11    dorzolamide-timolol (COSOPT) 2-0.5 % ophthalmic solution Place 1 drop into both eyes 2 times daily 10 mL 5    ibuprofen (ADVIL/MOTRIN) 200 MG tablet Take 200 mg by mouth every 4 hours as needed for pain      latanoprost (XALATAN) 0.005 % ophthalmic solution Place 1 drop into both eyes daily 7.5 mL 0    omeprazole (PRILOSEC) 20 MG DR capsule Take 1 capsule (20 mg) by mouth daily 30 capsule 2    oxyCODONE (ROXICODONE) 5 MG tablet Take 1 tablet (5 mg) by mouth every 6 hours as needed for severe pain 4 tablet 0    oxyCODONE (ROXICODONE) 5 MG tablet Take 1 tablet (5 mg) by mouth every 6 hours as needed for pain 3 tablet 0    prednisoLONE acetate (PRED FORTE) 1 % ophthalmic suspension Place 1-2 drops into the right eye every hour (while awake) 10 mL 3    predniSONE (DELTASONE) 10 MG tablet Take 3 pills (30 mg) each AM through Sunday, 6/30/24. Then starting on 7/1/24, then take 2 pills (20 mg) each AM until next visit. 100 tablet 1    valACYclovir (VALTREX) 1000 mg tablet Take 1 tablet (1,000 mg) by mouth 2 times daily 60 tablet 1     Facility-Administered Medications Ordered in Other Encounters   Medication Dose  "Route Frequency Provider Last Rate Last Admin    BUPivacaine 0.75% (pf) 4.5mL + lidocaine 2% (pf) 4.5mL + hyaluronidase (HYLENEX) 150 units   Retrobulbar Once Milton Wolf MD             ALLERGIES:  Allergies   Allergen Reactions    Droperidol Anxiety and Other (See Comments)       FAMILY HISTORY:  Family History   Problem Relation Age of Onset    Glaucoma No family hx of     Macular Degeneration No family hx of        SOCIAL HISTORY:   Social History     Socioeconomic History    Marital status:    Tobacco Use    Smoking status: Some Days     Types: Cigarettes, Cigars     Passive exposure: Current    Smokeless tobacco: Never   Substance and Sexual Activity    Alcohol use: Not Currently    Drug use: Yes     Types: Marijuana     Comment: Smokes daily    Sexual activity: Not Currently     Social Determinants of Health      Received from [employer] [employer]    Social Connections       VITALS:  /58   Pulse 50   Temp 98.3  F (36.8  C) (Oral)   Resp 19   Ht 1.727 m (5' 8\")   Wt 102.1 kg (225 lb)   SpO2 98%   BMI 34.21 kg/m      PHYSICAL EXAM    Constitutional: Well developed, Well nourished, NAD.  HENT: Normocephalic, Atraumatic. Neck Supple.  Eyes: EOMI, Conjunctiva normal.  Respiratory: Breathing comfortably on room air. Speaks full sentences easily. Lungs clear to ascultation.  Cardiovascular: Normal heart rate, Regular rhythm. No peripheral edema.  Abdomen: Soft, nontender  Musculoskeletal: Good range of motion in all major joints. No major deformities noted.  Integument: Warm, Dry.  Neurologic: Alert & awake, Normal motor function, Normal sensory function, No focal deficits noted.   Psychiatric: Cooperative. Affect appropriate.     LAB:  All pertinent labs reviewed and interpreted.  Labs Ordered and Resulted from Time of ED Arrival to Time of ED Departure   BASIC METABOLIC PANEL - Abnormal       Result Value    Sodium 140   "    Potassium 3.2 (*)     Chloride 106      Carbon Dioxide (CO2) 23      Anion Gap 11      Urea Nitrogen 16.2      Creatinine 0.75      GFR Estimate >90      Calcium 9.6      Glucose 160 (*)    HEPATIC FUNCTION PANEL - Abnormal    Protein Total 7.0      Albumin 4.4      Bilirubin Total 1.5 (*)     Alkaline Phosphatase 42      AST 23      ALT 28      Bilirubin Direct 0.28     CBC WITH PLATELETS AND DIFFERENTIAL - Abnormal    WBC Count 12.1 (*)     RBC Count 4.36 (*)     Hemoglobin 14.0      Hematocrit 39.6 (*)     MCV 91      MCH 32.1      MCHC 35.4      RDW 12.6      Platelet Count 180      % Neutrophils 86      % Lymphocytes 6      % Monocytes 7      % Eosinophils 0      % Basophils 0      % Immature Granulocytes 1      NRBCs per 100 WBC 0      Absolute Neutrophils 10.4 (*)     Absolute Lymphocytes 0.8      Absolute Monocytes 0.9      Absolute Eosinophils 0.0      Absolute Basophils 0.0      Absolute Immature Granulocytes 0.1      Absolute NRBCs 0.0     LIPASE - Normal    Lipase 27           EKG:    Performed at: July 4, 2024, 20:20:43, [facility] ED    Impression: Sinus bradycardia. Nonspecific T wave abnormality.  No interval changes, ok for droperidol.    Rate: 53 BPM  Rhythm: Sinus bradycardia  Axis: 43 56 27  SC Interval: 204 ms  QRS Interval: 102 ms  QTc Interval: 394 ms  ST Changes: Nonspecific ST abnormality  Comparison: No previous ECGs available.     I have independently reviewed and interpreted the EKG(s) documented above.      I, Oliver Garcia, am serving as a scribe to document services personally performed by Dr. Isaias Sinha, based on my observation and the provider's statements to me. I, Isaias Sinha MD attest that Oliver Garcia is acting in a scribe capacity, has observed my performance of the services and has documented them in accordance with my direction.    Isaias Sinha M.D.  Emergency Medicine  [facility] EMERGENCY ROOM  [address]  [id number]  [phone]  Dept: [phone]       Isaias Sinha MD  07/04/24 1653

## 2024-07-16 ENCOUNTER — TELEPHONE (OUTPATIENT)
Dept: OPHTHALMOLOGY | Facility: CLINIC | Age: 39
End: 2024-07-16

## 2024-07-16 ENCOUNTER — OFFICE VISIT (OUTPATIENT)
Dept: OPHTHALMOLOGY | Facility: CLINIC | Age: 39
End: 2024-07-16
Attending: OPHTHALMOLOGY
Payer: MEDICAID

## 2024-07-16 DIAGNOSIS — H20.00 ACUTE ANTERIOR UVEITIS OF RIGHT EYE: Primary | ICD-10-CM

## 2024-07-16 DIAGNOSIS — H40.2214 CHRONIC ANGLE-CLOSURE GLAUCOMA OF RIGHT EYE, INDETERMINATE STAGE: ICD-10-CM

## 2024-07-16 DIAGNOSIS — H40.032: Primary | ICD-10-CM

## 2024-07-16 PROCEDURE — G0463 HOSPITAL OUTPT CLINIC VISIT: HCPCS | Performed by: OPHTHALMOLOGY

## 2024-07-16 PROCEDURE — 99214 OFFICE O/P EST MOD 30 MIN: CPT | Mod: GC | Performed by: OPHTHALMOLOGY

## 2024-07-16 PROCEDURE — 92133 CPTRZD OPH DX IMG PST SGM ON: CPT | Performed by: OPHTHALMOLOGY

## 2024-07-16 RX ORDER — DORZOLAMIDE HYDROCHLORIDE AND TIMOLOL MALEATE PRESERVATIVE FREE 20; 5 MG/ML; MG/ML
1 SOLUTION/ DROPS OPHTHALMIC 2 TIMES DAILY
Qty: 15 EACH | Refills: 11 | Status: SHIPPED | OUTPATIENT
Start: 2024-07-16

## 2024-07-16 RX ORDER — TAFLUPROST OPTHALMIC 0 MG/.3ML
1 SOLUTION/ DROPS OPHTHALMIC AT BEDTIME
Qty: 10 EACH | Refills: 11 | Status: SHIPPED | OUTPATIENT
Start: 2024-07-16

## 2024-07-16 ASSESSMENT — CONF VISUAL FIELD
OS_NORMAL: 1
OS_SUPERIOR_NASAL_RESTRICTION: 0
OS_NORMAL: 1
METHOD: COUNTING FINGERS
OD_SUPERIOR_NASAL_RESTRICTION: 0
OD_INFERIOR_TEMPORAL_RESTRICTION: 0
OS_SUPERIOR_NASAL_RESTRICTION: 0
OS_INFERIOR_NASAL_RESTRICTION: 0
OS_SUPERIOR_TEMPORAL_RESTRICTION: 0
OD_NORMAL: 1
OS_SUPERIOR_TEMPORAL_RESTRICTION: 0
OD_NORMAL: 1
OD_SUPERIOR_NASAL_RESTRICTION: 0
METHOD: COUNTING FINGERS
OS_INFERIOR_NASAL_RESTRICTION: 0
OD_INFERIOR_NASAL_RESTRICTION: 0
OS_INFERIOR_TEMPORAL_RESTRICTION: 0
OD_SUPERIOR_TEMPORAL_RESTRICTION: 0
OS_INFERIOR_TEMPORAL_RESTRICTION: 0
OD_INFERIOR_NASAL_RESTRICTION: 0
OD_SUPERIOR_TEMPORAL_RESTRICTION: 0
OD_INFERIOR_TEMPORAL_RESTRICTION: 0

## 2024-07-16 ASSESSMENT — VISUAL ACUITY
OS_SC: 20/25
OD_SC: 20/80
OD_SC+: -1
OS_SC+: +2
METHOD: SNELLEN - LINEAR
OD_SC: 20/80
OS_SC+: +2
OD_SC+: -1
METHOD: SNELLEN - LINEAR
OS_SC: 20/25

## 2024-07-16 ASSESSMENT — TONOMETRY
OD_IOP_MMHG: 28
OD_IOP_MMHG: 28
IOP_METHOD: TONOPEN
OS_IOP_MMHG: 21
OS_IOP_MMHG: 21
IOP_METHOD: TONOPEN

## 2024-07-16 NOTE — TELEPHONE ENCOUNTER
Pt left without seeing Dr Villagomez.  I called and left message for Power to please come back to the clinic or make an appointment before the end of the week with Dr Villagomez.     I did leave the call centers phone number.

## 2024-07-16 NOTE — TELEPHONE ENCOUNTER
Prior Authorization Retail Medication Request    Medication/Dose:   tafluprost (ZIOPTAN) 0.0015 % SOLN DRP 30 SNGL/U VL    Diagnosis and ICD code (if different than what is on RX):    New/renewal/insurance change PA/secondary ins. PA:  Previously Tried and Failed:    Rationale:      Insurance   Primary:   Insurance ID:      Secondary (if applicable):  Insurance ID:      Pharmacy Information (if different than what is on RX)  Name:    Phone:    Fax:    See [facility]

## 2024-07-16 NOTE — NURSING NOTE
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 He denies pain. He can still inflammation that feels like a numbness. He is still taking Prednisone 20 mg qday, Valtrex BID and Diamox BID. He is not taking any prescription drops, only lubricant drops.     Sandra Barrios OA 9:04 AM July 16, 2024

## 2024-07-16 NOTE — PATIENT INSTRUCTIONS
Valtrex 1000 mg twice daily  Prednisone pills as per Dr. Villagomez   Start Cosopt PF 1 drop twice daily each eye   Start Zioptan 1 drop at bedtime each eye   Continue Diamox 250mg twice daily and then in 2 weeks decrease to 1 pill once daily until next visit

## 2024-07-16 NOTE — PROGRESS NOTES
Chief Complaint/Presenting Concern: Postoperative visit     History of Present Illness:   Power Blanco is a 38 year old patient who presents for evaluation of angle closure right eye. His initial presentation in March 2024 was concerning for possible sildenafil-related angle closure?. He reports that in the past he has had similar small episodes of blurry vision and eye pain after using sildenafil. He initially responded to diamox and topical IOP medication. Subsequently an LPI was performed which was unsuccessful 3/26/24.    3/31/24 his IOP spiked and he underwent CPC right eye.    Today 7/16/24: patient states around April 6-7, stopped glaucoma drops due to pain after surgery. He continue everything prednisolone, and vision cleared up. He also took oral medications (prednisone, diamox, and valtrex). Since his last visit with April 30th with Dr. Villagomez, he attempted restarting the glaucoma drops but it caused blurry vision so he stopped. He has also had issues with insurance and was paying for drops and medications out of pocket and expenses were limiting compliance.  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  Year diagnosis  Previous glaucoma surgery/laser   -3/20/24 unsuccessful LPI right eye, successful left eye    -CPC right eye 3/31/24   Maximum intraocular pressure 53/42  Current meds: see below  Family history: negative  CCT: At future visit  Gonio: right eye with 360 synechial closure, left eye with 270 appositional closure, 90 synechial  Refractive status: Reports no glasses wear as a child  Trauma history: positive - corneal abrasion right eye ~2022  Steroid exposure: negative  Vasospastic disease: Migrane/Raynaud phenomenon: negative  A past hemodynamic crisis or Low BP:: negative  Meds AEs/intolerance: None  PMHx: Positive for: None Negative for : Asthma and  respiratory problems/Cardiac/Renal/Kidney stones/Sulfa Allergy  Anticoagulants: None    Today's testing:  OCT RNFL 24:  Right eye: RNFL WNL, stable   Left eye: RNFL WNL, diffusely thinner compared to last OCT but variable test quality       Additional Ocular History: None    Plan/Recommendations:  Discussed findings with patient. Presents with history of acute and chronic angle closure glaucoma right eye and primary angle closure suspect left eye. There is likely a component of response to sildenafil. IOP was refractory to LPI and medical management  S/p TSCPC 3/31/24, significant AC reaction postop, possibly a component of ciliary body bleed?  Poor compliance with glaucoma drops, and pt preference to not be on glaucoma drops.   Now that AC inflammation is better and that the surface disease is better recommend restarting glaucoma drops one at a time and try Preservative free drops. While giving the patient this recommendation, he stated that he is upset that he was not given this option previously, he also stated that he is upset that when I, Dr. Wolf, passed by to check on his when he was in Dr. Villagomez's clinic for follow up appointments with Dr. Villagomez, I did not see to know fully what medications he is using and he did not like the way I was opening his eyes while examining him. He also went on to express during the conversation that he thinks I am being load and does not appreciate my body language and hand movements while talking.   I at that time called the manager to join us in the room (Debbie) in order to deliver the best conversation and care to the patient. I continued to explain to the patient what I recommend moving forward which is the followin. I recommend that he books his appointment with me for follow up with me and that I do not pass by any other clinic to check on his as courtesy despite the higher cost. (Despite me clearly explaining this the patient left after my visit before  seeing Dr. Villagomez on a separate visit )  2. Expressed my understanding and emphasized that I had no intention to make him feel uncomfortable with me voice or body tone.   3. I recommended to Start Cosopt PF 1 drop twice daily each eye and Start Zioptan 1 drop at bedtime each eye and Continue Diamox 250mg twice daily and then in 2 weeks decrease to 1 pill once daily until next visit. This may be a bid cost out of pocket and advised okay to retry preservative Cosopt and Latanoprost if that is the case.   4. Long conversation with the patient of the severity and seriousness of his condition and that this is a long journey that we will need to modify drops and procedures in the future as we go along. Long discussion on the   Defer to Dr. Villagomez for prednisolone drop regimen (to see him today)  Continue Prednisone as per Dr. Villagomez, seeing him today   Discussed posterior synechiae and cataract today and eventual need for cataract extraction. Will work with Dr. Villagomez to address inflammation and timing for surgery. Higher risk of IOP spike and worsening inflammation after surgery.     RTC 1 month, [facility], IOP     Rubio Kelly MD  PGY-3 Ophthalmology Resident  [facility]      Physician Attestation     Attending Physician Attestation:  Complete documentation of historical and exam elements from today's encounter can be found in the full encounter summary report (not reduplicated in this progress note). I personally obtained the chief complaint(s) and history of present illness. I confirmed and edited as necessary the review of systems, past medical/surgical history, family history, social history, and examination findings as documented by others; and I examined the patient myself. I personally reviewed the relevant tests, images, and reports as documented above. I formulated and edited as necessary the assessment and plan and discussed the findings and management plan with the patient and any family  members present at the time of the visit.  Milton Wolf M.D., Glaucoma, July 16,, 2024

## 2024-07-16 NOTE — PROGRESS NOTES
No DR. Villagomez exam today. Patient left after Dr. Wolf visit and was called but did not answer. He was asked to call back for an appointment this week.    Devon Villagomez M.D.  Uveitis and Medical Retina  [facility] Department of Ophthalmology and Visual Neurosciences

## 2024-07-16 NOTE — TELEPHONE ENCOUNTER
Prior Authorization Retail Medication Request    Medication/Dose:   dorzolamide-timolol PF (COSOPT PF) opthalmic solution 60S  Diagnosis and ICD code (if different than what is on RX):    New/renewal/insurance change PA/secondary ins. PA:  Previously Tried and Failed:    Rationale:      Insurance   Primary:   Insurance ID:      Secondary (if applicable):  Insurance ID:      Pharmacy Information (if different than what is on RX)  Name:    Phone:    Fax:    See Chart

## 2024-07-16 NOTE — NURSING NOTE
Chief Complaints and History of Present Illnesses   Patient presents with    Uveitis Follow-Up     Chief Complaint(s) and History of Present Illness(es)       Uveitis Follow-Up              Laterality: right eye    Quality: blurred vision    Associated symptoms: Negative for flashes and floaters    Pain scale: 0/10              Comments    Here for Acute anterior uveitis of right eye  Pt reports some blurriness in his right eye. He denies pain. He can still inflammation that feels like a numbness.   He is still taking Prednisone 20 mg qday, Valtrex BID and Diamox BID. He is not taking any prescription drops, only lubricant drops.     Sandra Barrios OA 8:55 AM July 16, 2024

## 2024-07-19 ASSESSMENT — EXTERNAL EXAM - LEFT EYE: OS_EXAM: NORMAL

## 2024-07-19 ASSESSMENT — CUP TO DISC RATIO
OD_RATIO: 0.5
OS_RATIO: 0.4

## 2024-07-19 ASSESSMENT — EXTERNAL EXAM - RIGHT EYE: OD_EXAM: NORMAL

## 2024-07-19 ASSESSMENT — SLIT LAMP EXAM - LIDS: COMMENTS: NORMAL

## 2024-07-19 NOTE — TELEPHONE ENCOUNTER
Central Prior Authorization Team  Phone: [phone]    PA Initiation    Medication: ZIOPTAN 0.0015 % OP SOLN  Insurance Company: [place] [employer] ([facility]) - Phone [phone] Fax [phone]  Pharmacy Filling the Rx: [id number] [place], [place] - [address] AT [facility] [address] [place]  Filling Pharmacy Phone: [phone]  Filling Pharmacy Fax:    Start Date: 7/19/2024

## 2024-07-22 NOTE — TELEPHONE ENCOUNTER
Central Prior Authorization Team  Phone: [phone]    PA Initiation    Medication: DORZOLAMIDE HCL-TIMOLOL MAL PF 2-0.5 % OP SOLN  Insurance Company: [place] [employer] ([facility]) - Phone [phone] Fax [phone]  Pharmacy Filling the Rx: [id number] [place], [place] - [address] AT [facility] [address] [place]  Filling Pharmacy Phone: [phone]  Filling Pharmacy Fax:    Start Date: 7/22/2024

## 2024-07-23 NOTE — TELEPHONE ENCOUNTER
Spoke to pt at 1255    Reviewed Zioptan prior authorization not covered by insurance and PA denied.    Pt not able to tolerate previous drops/intolerances-- burning/stinging with application.    Reviewed we can send script to [facility] in [facility] who works with  to dispense for about 50/month with delivery.    Pt will call direct triage line later this week if would like to pursue further    Reviewed would forward to team to assist in letter of appeal.    Arik Rangel RN 12:59 PM 07/23/24

## 2024-07-23 NOTE — TELEPHONE ENCOUNTER
Central Prior Authorization Team  Phone: [phone]    PRIOR AUTHORIZATION DENIED    Medication: ZIOPTAN 0.0015 % OP SOLN  Insurance Company: [place] Medicaid ([facility]) - Phone [phone] Fax [phone]  Denial Date: 7/23/2024  Denial Reason(s):         Appeal Information:         Patient Notified: NO- Unfortunately, we cannot call the patient with denials because we do not know what next steps the MD will take nor can we give medical advice, please notify the patient of what they are to expect for the continuation of their therapy from the provider.

## 2024-07-25 NOTE — TELEPHONE ENCOUNTER
Central Prior Authorization Team  Phone: [phone]    PRIOR AUTHORIZATION DENIED    Medication: DORZOLAMIDE HCL-TIMOLOL MAL PF 2-0.5 % OP SOLN  Insurance Company: [place] Medicaid ([facility]) - Phone [phone] Fax [phone]  Denial Date: 7/25/2024  Denial Reason(s):         Appeal Information:         Patient Notified: NO- Unfortunately, we cannot call the patient with denials because we do not know what next steps the MD will take nor can we give medical advice, please notify the patient of what they are to expect for the continuation of their therapy from the provider.

## 2024-07-25 NOTE — TELEPHONE ENCOUNTER
Central Prior Authorization Team  Phone: [phone]    Medication Appeal Initiation    Medication: ZIOPTAN 0.0015 % OP SOLN  Appeal Start Date:  7/25/2024  Insurance Company: [employer]  Insurance Phone: [phone]  Insurance Fax: [phone]  Comments:    faxed lmn to insurance

## 2024-07-30 NOTE — TELEPHONE ENCOUNTER
Central Prior Authorization Team  Phone: [phone]    Have not rec outcome. Faxed back to insurance

## 2024-07-31 NOTE — TELEPHONE ENCOUNTER
Central Prior Authorization Team  Phone: [phone]    MEDICATION APPEAL DENIED    Medication: ZIOPTAN 0.0015 % OP SOLN  Insurance Company: [place] [employer]  Denial Date: 7/31/2024  Denial Reason(s):          Second Level Appeal Information: Second level appeals will be managed by the clinic staff and provider. Please contact the [employer] Prior Authorization Team if additional information about the denial is needed.        Patient Notified: NO- Unfortunately, we cannot call the patient with denials because we do not know what next steps the MD will take nor can we give medical advice, please notify the patient of what they are to expect for the continuation of their therapy from the provider.  .

## 2024-08-02 ENCOUNTER — TELEPHONE (OUTPATIENT)
Dept: OPHTHALMOLOGY | Facility: CLINIC | Age: 39
End: 2024-08-02
Payer: COMMERCIAL

## 2024-08-02 NOTE — TELEPHONE ENCOUNTER
Called and left message asking [place] to call back to make an appointment with Dr Villagomez.  I offered to add on a second appointment on 8/13 when he is seeing Dr Wolf. I left the call centers phone number.

## 2024-08-15 ENCOUNTER — TELEPHONE (OUTPATIENT)
Dept: OPHTHALMOLOGY | Facility: CLINIC | Age: 39
End: 2024-08-15
Payer: COMMERCIAL

## 2024-08-19 ENCOUNTER — MYC REFILL (OUTPATIENT)
Dept: OPHTHALMOLOGY | Facility: CLINIC | Age: 39
End: 2024-08-19
Payer: COMMERCIAL

## 2024-08-19 ENCOUNTER — MYC MEDICAL ADVICE (OUTPATIENT)
Dept: OPHTHALMOLOGY | Facility: CLINIC | Age: 39
End: 2024-08-19
Payer: COMMERCIAL

## 2024-08-19 DIAGNOSIS — H40.211 ACUTE ANGLE-CLOSURE GLAUCOMA OF RIGHT EYE: ICD-10-CM

## 2024-08-19 DIAGNOSIS — H20.00 ACUTE ANTERIOR UVEITIS OF RIGHT EYE: ICD-10-CM

## 2024-08-19 RX ORDER — ACETAZOLAMIDE 250 MG/1
250 TABLET ORAL 2 TIMES DAILY
Qty: 60 TABLET | Refills: 2 | Status: CANCELLED | OUTPATIENT
Start: 2024-08-19

## 2024-08-19 RX ORDER — PREDNISONE 10 MG/1
TABLET ORAL
Qty: 100 TABLET | Refills: 1 | Status: CANCELLED | OUTPATIENT
Start: 2024-08-19

## 2024-08-20 DIAGNOSIS — H20.00 ACUTE ANTERIOR UVEITIS OF RIGHT EYE: ICD-10-CM

## 2024-08-20 RX ORDER — VALACYCLOVIR HYDROCHLORIDE 1 G/1
1000 TABLET, FILM COATED ORAL 2 TIMES DAILY
Qty: 60 TABLET | Refills: 1 | OUTPATIENT
Start: 2024-08-20

## 2024-08-20 NOTE — TELEPHONE ENCOUNTER
Spoke to pt at 1030    Pt confirming has all 3 medication on person and no refills needed at this time for Oral Prednisone, Diamox and Valtrex.    Pt aware needing to reschedule and was main reason for pt reaching out.    Pt did not have time to schedule during time of call and will communicate via the [employer] message pt also sent to confirm scheduling options.    Arik Rangel RN 10:38 AM 08/20/24

## 2024-10-10 ENCOUNTER — MYC REFILL (OUTPATIENT)
Dept: OPHTHALMOLOGY | Facility: CLINIC | Age: 39
End: 2024-10-10
Payer: COMMERCIAL

## 2024-10-10 DIAGNOSIS — H40.211 ACUTE ANGLE-CLOSURE GLAUCOMA OF RIGHT EYE: ICD-10-CM

## 2024-10-11 RX ORDER — ACETAZOLAMIDE 250 MG/1
250 TABLET ORAL 2 TIMES DAILY
Qty: 30 TABLET | Refills: 0 | Status: SHIPPED | OUTPATIENT
Start: 2024-10-11 | End: 2024-10-18

## 2024-10-18 ENCOUNTER — OFFICE VISIT (OUTPATIENT)
Dept: OPHTHALMOLOGY | Facility: CLINIC | Age: 39
End: 2024-10-18
Attending: OPHTHALMOLOGY
Payer: COMMERCIAL

## 2024-10-18 DIAGNOSIS — H40.211 ACUTE ANGLE-CLOSURE GLAUCOMA OF RIGHT EYE: ICD-10-CM

## 2024-10-18 DIAGNOSIS — H57.04 MYDRIASIS: ICD-10-CM

## 2024-10-18 DIAGNOSIS — Z79.899 HIGH RISK MEDICATION USE: ICD-10-CM

## 2024-10-18 DIAGNOSIS — H20.00 ACUTE ANTERIOR UVEITIS OF RIGHT EYE: Primary | ICD-10-CM

## 2024-10-18 DIAGNOSIS — H26.221 CATARACT IN INFLAMMATORY DISORDER, RIGHT: ICD-10-CM

## 2024-10-18 DIAGNOSIS — H40.032: ICD-10-CM

## 2024-10-18 PROCEDURE — G0463 HOSPITAL OUTPT CLINIC VISIT: HCPCS | Performed by: OPHTHALMOLOGY

## 2024-10-18 PROCEDURE — 99213 OFFICE O/P EST LOW 20 MIN: CPT | Performed by: OPHTHALMOLOGY

## 2024-10-18 RX ORDER — OMEPRAZOLE 40 MG/1
40 CAPSULE, DELAYED RELEASE ORAL DAILY
Qty: 60 CAPSULE | Refills: 1 | Status: SHIPPED | OUTPATIENT
Start: 2024-10-18

## 2024-10-18 RX ORDER — ACETAZOLAMIDE 500 MG/1
500 CAPSULE, EXTENDED RELEASE ORAL DAILY
Qty: 60 CAPSULE | Refills: 1 | Status: SHIPPED | OUTPATIENT
Start: 2024-10-18

## 2024-10-18 RX ORDER — PREDNISONE 5 MG/1
5 TABLET ORAL DAILY
Qty: 60 TABLET | Refills: 1 | Status: SHIPPED | OUTPATIENT
Start: 2024-10-18

## 2024-10-18 RX ORDER — VALACYCLOVIR HYDROCHLORIDE 1 G/1
1000 TABLET, FILM COATED ORAL DAILY
Qty: 60 TABLET | Refills: 1 | Status: SHIPPED | OUTPATIENT
Start: 2024-10-18

## 2024-10-18 ASSESSMENT — TONOMETRY
IOP_METHOD: TONOPEN
OS_IOP_MMHG: 20
OD_IOP_MMHG: 24

## 2024-10-18 ASSESSMENT — VISUAL ACUITY
OD_PH_SC+: +1
OD_SC+: -1
METHOD: SNELLEN - LINEAR
OS_SC: 20/25
OD_PH_SC: 20/50
OD_SC: 20/70

## 2024-10-18 ASSESSMENT — CONF VISUAL FIELD
OS_NORMAL: 1
METHOD: COUNTING FINGERS
OS_SUPERIOR_NASAL_RESTRICTION: 0
OS_SUPERIOR_TEMPORAL_RESTRICTION: 0
OD_SUPERIOR_TEMPORAL_RESTRICTION: 0
OS_INFERIOR_TEMPORAL_RESTRICTION: 0
OD_NORMAL: 1
OS_INFERIOR_NASAL_RESTRICTION: 0
OD_INFERIOR_TEMPORAL_RESTRICTION: 0
OD_SUPERIOR_NASAL_RESTRICTION: 0
OD_INFERIOR_NASAL_RESTRICTION: 0

## 2024-10-18 ASSESSMENT — SLIT LAMP EXAM - LIDS: COMMENTS: NORMAL

## 2024-10-18 ASSESSMENT — CUP TO DISC RATIO
OD_RATIO: 0.5
OS_RATIO: 0.4

## 2024-10-18 ASSESSMENT — EXTERNAL EXAM - RIGHT EYE: OD_EXAM: NORMAL

## 2024-10-18 ASSESSMENT — EXTERNAL EXAM - LEFT EYE: OS_EXAM: NORMAL

## 2024-10-18 NOTE — NURSING NOTE
Chief Complaints and History of Present Illnesses   Patient presents with    Uveitis Follow-Up     Acute anterior uveitis of right eye     Chief Complaint(s) and History of Present Illness(es)       Uveitis Follow-Up              Comments: Acute anterior uveitis of right eye              Comments    States improved vision since last visit   States no flashes or floaters   No eye pain or redness    Nan RAI 10:03 AM October 18, 2024                         [facility] called wanting to clarify the prescription for entresto 24-26 mg 0.5 tablet BID. She stated entresto is not normally cut in half. Please advise if this is the correct dose. Please call them at [phone].

## 2024-10-18 NOTE — PATIENT INSTRUCTIONS
Here's the plan for the pills: Acetazolamide (Diamox) 500 mg extended release once daily, Omeprazole 40 mg daily, Valtrex 1000 mg daily, Prednisone 5 mg daily

## 2024-10-18 NOTE — LETTER
10/18/2024       RE: Power Blanco  [address]  [place] [address]     Dear Mr. Blanco:    Thank you for your visit to the [facility] [place] at [facility]. Please see a copy of my visit note below.    Chief Complaint/Presenting Concern:  Uveitis     Interval History of Present Ocular Illness:  Power Blanco is a 39 year old patient who returns for follow up of his uveitis and angle closure glaucoma of the right eye. At our last official visit in June things were doing better and we recommended some tapering of medications.    Power reports that when he has a fan on at night that the vision is blurry for a few hours. Otherwise occasional blurriness at times when driving. Left eye still fine    Interval Updates to Medical/Family/Social History:    Started new job and seeing at work is a little blurry at times    Relevant Review of Systems Updates:  Had very severe course of acid reflux and stopped prednisone one week and has been okay since.     Labs: 7/2024: Hepatic panel WNL, BMP WNL including creatinine but elevated glucose 160. CBC WNL other than elevated WBC 12.1     Current eye related medications:  Valtrex 1000 mg once daily, Acetazolamide (Diamox) pill 250 mg twice daily, stopped prednisone one week ago, no omeprazole, Artificial tears as needed but no other drops    Retina/Uveitis Imaging: None today    Assessment:     1. Acute anterior uveitis of right eye  Improving but still some residual inflammation    2. Mydriasis - Right Eye  Persistent and likely related     3. Cataract in inflammatory disorder, right  Progressed since last visit     4. Acute angle-closure glaucoma of right eye  IOP 24    5. Angle-closure glaucoma suspect of left eye  IOP 20    6. High risk medication use  Valtrex 1000 mg once daily, Acetazolamide 250 mg twice daily, off prednisone 10 mg daily    Plan/Recommendations:    Discussed findings with patient  and his Wife. There is improving but not yet resolved inflammation in the front of the right eye. The iris atrophic changes are stable but the cataract has progressed. We discussed continuing low dose Valtrex and prednisone without the need for drops  Eye pressure is 24, 20 on Acetazolamide 250 mg twice daily without drops. The optic nerve looks healthy on exam and we should continue Acetazolamide but will try 500 mg sequel once daily   The cataract has progressed in the right eye. Although ideal to wait until no active inflammation for a few months, this is the best things have been in some time and vision limitation is affecting work. We discussed check in with one of our colleagues to discuss cataract and then consider surgery when this would be appropriate. We did not discuss in detail but the iris atrophy may not change much with cataract surgery  Recommend additional testing: None at this time  Here's the plan for the pills: Acetazolamide (Diamox) 500 mg extended release once daily, Omeprazole 40 mg daily, Valtrex 1000 mg daily, Prednisone 5 mg daily  No drops needed     RTC Return for  next avail Cataract eval.. Then ASPEN STATON    Physician Attestation    Attending Physician Attestation:  Complete documentation of historical and exam elements from today's encounter can be found in the full encounter summary report (not reduplicated in this progress note). I personally obtained the chief complaint(s) and history of present illness. I confirmed and edited as necessary the review of systems, past medical/surgical history, family history, social history, and examination findings as documented by others; and I examined the patient myself. I personally reviewed the relevant tests, images, and reports as documented above. I formulated and edited as necessary the assessment and plan and discussed the findings and management plan with the patient and family members present at the time of this visit.  Devon  WALLY Villagomez, Uveitis and Medical Retina, October 18, 2024     Sincerely,     Devon Villagomez MD  Uveitis and Medical Retina    Department of Ophthalmology & Visual Neurosciences  [facility]

## 2024-10-18 NOTE — PROGRESS NOTES
Chief Complaint/Presenting Concern:  Uveitis     Interval History of Present Ocular Illness:  Power Blanco is a 39 year old patient who returns for follow up of his uveitis and angle closure glaucoma of the right eye. At our last official visit in June things were doing better and we recommended some tapering of medications.    Power reports that when he has a fan on at night that the vision is blurry for a few hours. Otherwise occasional blurriness at times when driving. Left eye still fine    Interval Updates to Medical/Family/Social History:    Started new job and seeing at work is a little blurry at times    Relevant Review of Systems Updates:  Had very severe course of acid reflux and stopped prednisone one week and has been okay since.     Labs: 7/2024: Hepatic panel WNL, BMP WNL including creatinine but elevated glucose 160. CBC WNL other than elevated WBC 12.1     Current eye related medications:  Valtrex 1000 mg once daily, Acetazolamide (Diamox) pill 250 mg twice daily, stopped prednisone one week ago, no omeprazole, Artificial tears as needed but no other drops    Retina/Uveitis Imaging: None today    Assessment:     1. Acute anterior uveitis of right eye  Improving but still some residual inflammation    2. Mydriasis - Right Eye  Persistent and likely related     3. Cataract in inflammatory disorder, right  Progressed since last visit     4. Acute angle-closure glaucoma of right eye  IOP 24    5. Angle-closure glaucoma suspect of left eye  IOP 20    6. High risk medication use  Valtrex 1000 mg once daily, Acetazolamide 250 mg twice daily, off prednisone 10 mg daily    Plan/Recommendations:    Discussed findings with patient and his Wife. There is improving but not yet resolved inflammation in the front of the right eye. The iris atrophic changes are stable but the cataract has progressed. We discussed continuing low dose Valtrex and prednisone without the need for drops  Eye pressure is 24, 20 on  Acetazolamide 250 mg twice daily without drops. The optic nerve looks healthy on exam and we should continue Acetazolamide but will try 500 mg sequel once daily   The cataract has progressed in the right eye. Although ideal to wait until no active inflammation for a few months, this is the best things have been in some time and vision limitation is affecting work. We discussed check in with one of our colleagues to discuss cataract and then consider surgery when this would be appropriate. We did not discuss in detail but the iris atrophy may not change much with cataract surgery  Recommend additional testing: None at this time  Here's the plan for the pills: Acetazolamide (Diamox) 500 mg extended release once daily, Omeprazole 40 mg daily, Valtrex 1000 mg daily, Prednisone 5 mg daily  No drops needed     RTC Return for  next avail Cataract eval.. Then ASPEN STATON    Physician Attestation     Attending Physician Attestation:  Complete documentation of historical and exam elements from today's encounter can be found in the full encounter summary report (not reduplicated in this progress note). I personally obtained the chief complaint(s) and history of present illness. I confirmed and edited as necessary the review of systems, past medical/surgical history, family history, social history, and examination findings as documented by others; and I examined the patient myself. I personally reviewed the relevant tests, images, and reports as documented above. I formulated and edited as necessary the assessment and plan and discussed the findings and management plan with the patient and family members present at the time of this visit.  Devon Villagomez M.D., Uveitis and Medical Retina, October 18, 2024

## 2024-10-18 NOTE — LETTER
October 18, 2024      Re: Power Blanco   1985    To Whom It May Concern:    This is to confirm that the above patient was seen on 10/18/2024.  Power Blanco is able to return to work today. Thank you for allowing Power Blanco to take time from work for this necessary medical appointment and all the time for this visit. Please contact us for any questions.      Thank you for your cooperation in this matter.  Please do not hesitate to contact me if you have any further questions.    Sincerely,      NATO URBANO

## 2025-01-07 ENCOUNTER — OFFICE VISIT (OUTPATIENT)
Dept: OPHTHALMOLOGY | Facility: CLINIC | Age: 40
End: 2025-01-07
Attending: OPTOMETRIST
Payer: COMMERCIAL

## 2025-01-07 DIAGNOSIS — H21.41: ICD-10-CM

## 2025-01-07 DIAGNOSIS — H26.30 TOXIC CATARACT: Primary | ICD-10-CM

## 2025-01-07 DIAGNOSIS — H20.11 CHRONIC IRIDOCYCLITIS OF RIGHT EYE: ICD-10-CM

## 2025-01-07 DIAGNOSIS — H40.51X4: ICD-10-CM

## 2025-01-07 PROCEDURE — 92025 CPTRIZED CORNEAL TOPOGRAPHY: CPT | Performed by: OPHTHALMOLOGY

## 2025-01-07 PROCEDURE — 76519 ECHO EXAM OF EYE: CPT | Performed by: OPHTHALMOLOGY

## 2025-01-07 PROCEDURE — 99214 OFFICE O/P EST MOD 30 MIN: CPT | Performed by: OPHTHALMOLOGY

## 2025-01-07 ASSESSMENT — REFRACTION_MANIFEST
OD_ADD: +2.00
OD_SPHERE: +1.50
OD_AXIS: 105
OS_ADD: +2.00
OS_AXIS: 080
OS_SPHERE: -0.50
OS_CYLINDER: +1.00
OD_CYLINDER: +2.25

## 2025-01-07 ASSESSMENT — SLIT LAMP EXAM - LIDS: COMMENTS: NORMAL

## 2025-01-07 ASSESSMENT — CONF VISUAL FIELD
METHOD: COUNTING FINGERS
OS_INFERIOR_NASAL_RESTRICTION: 0
OD_SUPERIOR_TEMPORAL_RESTRICTION: 0
OS_SUPERIOR_TEMPORAL_RESTRICTION: 0
OD_INFERIOR_TEMPORAL_RESTRICTION: 0
OD_SUPERIOR_NASAL_RESTRICTION: 0
OD_INFERIOR_NASAL_RESTRICTION: 0
OD_NORMAL: 1
OS_INFERIOR_TEMPORAL_RESTRICTION: 0
OS_NORMAL: 1
OS_SUPERIOR_NASAL_RESTRICTION: 0

## 2025-01-07 ASSESSMENT — VISUAL ACUITY
METHOD: SNELLEN - LINEAR
OS_SC+: -2
OS_SC: 20/30
OD_SC: 20/50
OD_PH_SC: 20/40
OS_PH_SC: 20/25
OD_SC+: -1

## 2025-01-07 ASSESSMENT — TONOMETRY
OS_IOP_MMHG: 30
OD_IOP_MMHG: 27
IOP_METHOD: TONOPEN

## 2025-01-07 ASSESSMENT — CUP TO DISC RATIO
OS_RATIO: 0.4
OD_RATIO: 0.3

## 2025-01-07 ASSESSMENT — EXTERNAL EXAM - RIGHT EYE: OD_EXAM: NORMAL

## 2025-01-07 ASSESSMENT — EXTERNAL EXAM - LEFT EYE: OS_EXAM: NORMAL

## 2025-01-07 NOTE — PROGRESS NOTES
"HPI       Cataract Evaluation    Associated symptoms include glare, haloes, starburst and a need for brighter lights.  Negative for double vision.  Treatments tried include artificial tears.  Pain was noted as 0/10. Additional comments: Cat Eval each eye             Comments    Patient reports glare with oncoming light and needing more light to read, and randomly discomfort in the eyes.   Ocular Meds:   AT's  Diamox  Pre-DM.  BS not checked. No results found for: \"A1C\" -- last unknown.  Rachel Aly OA 9:02 AM January 7, 2025           Last edited by Rachel Aly on 1/7/2025  9:02 AM.          Review of systems for the eyes was negative other than the pertinent positives/negatives listed in the HPI.      Assessment & Plan      Power Blanco is a 39 year old male with the following diagnoses:   1. Toxic cataract    2. Pupillary seclusion of right eye    3. Glaucoma associated with pupillary block, indeterminate stage, right    4. Chronic iridocyclitis of right eye         Referral from Dr. Villagomez/Kristin for cataract and glaucoma eval  S/P Laser peripheral iridotomy (LPI) in both eyes (aborted due to discomfort)  S/P tsCPC right eye 3/31/24 with marked PostOp inflammation and hyphema  Currently on prednisone 10 mg daily, valtrex 1 g daily, and Diamox ER once daily   Reports intolerance to all previous drops due to blur  Recommended preservative free glaucoma drops, but did not get due to lack of insurance and high cost    Cataract, right eye - Visually significant  VISUALLY SIGNIFICANT cyclitic membrane  Risks, benefits and alternatives to cataract extraction/IOL implantation discussed; consent obtained.  Will schedule surgery today    Special equipment/needs:    Anesthesia:General c block (previous high pain with lasers)  Dilation:Poor Dilation  Iris expansion:Synechialysis, excision of membrane  Pseudoexfoliation: No pseudoexfoliation  Trypan Blue:  Possible  Guarded vision potential reviewed.  " Recommend monofocal lens only  Goal emmetropia  - SY60WF  Possible GSL +/- OMNI  Avoid ENDOCYLOPHOTOCOAGULATION given previous inflammation after cpc   Discussed likely permanent iris atrophy despite surgery.  May require further surgery in the future pending intraocular pressure response and PostOp inflammation   Continue current oral meds for now  Plan Pred Healon PostOp   Will discuss intraocular v periocular steroid with JY            Attending Physician Attestation:  Complete documentation of historical and exam elements from today's encounter can be found in the full encounter summary report (not reduplicated in this progress note).  I personally obtained the chief complaint(s) and history of present illness.  I confirmed and edited as necessary the review of systems, past medical/surgical history, family history, social history, and examination findings as documented by others; and I examined the patient myself.  I personally reviewed the relevant tests, images, and reports as documented above.  I formulated and edited as necessary the assessment and plan and discussed the findings and management plan with the patient and family. The longitudinal plan of care for the diagnosis(es)/condition(s) as documented were addressed during this visit. Due to the added complexity in care, I will continue to support Power Blanco in the subsequent management and with the ongoing continuity of care . - Chan Bustamante MD

## 2025-01-07 NOTE — NURSING NOTE
"Chief Complaints and History of Present Illnesses   Patient presents with    Cataract Evaluation     Cat Eval each eye     Chief Complaint(s) and History of Present Illness(es)       Cataract Evaluation              Associated symptoms: glare, haloes, starburst and a need for brighter lights.  Negative for double vision    Treatments tried: artificial tears    Pain scale: 0/10    Comments: Cat Eval each eye              Comments    Patient reports glare with oncoming light and needing more light to read, and randomly discomfort in the eyes.   Ocular Meds:   AT's  Diamox  Pre-DM.  BS not checked. No results found for: \"A1C\" -- last unknown.  Rachel AGUILLON 9:02 AM January 7, 2025                    "

## 2025-01-08 ENCOUNTER — TELEPHONE (OUTPATIENT)
Dept: OPHTHALMOLOGY | Facility: CLINIC | Age: 40
End: 2025-01-08
Payer: COMMERCIAL

## 2025-01-08 NOTE — TELEPHONE ENCOUNTER
Attempted to contact patient. Phone rang, no answer and was not able to leave message. Will try to reach patient at another time. Maricruz Whalen on 1/8/2025 at 6:00 PM

## 2025-01-13 PROBLEM — H40.51X4: Status: ACTIVE | Noted: 2025-01-07

## 2025-01-13 PROBLEM — H21.41: Status: ACTIVE | Noted: 2025-01-07

## 2025-01-13 PROBLEM — H26.30: Status: ACTIVE | Noted: 2025-01-07

## 2025-01-13 PROBLEM — H20.11: Status: ACTIVE | Noted: 2025-01-07

## 2025-01-14 NOTE — TELEPHONE ENCOUNTER
FUTURE VISIT INFORMATION      SURGERY INFORMATION:  Date: 2/3/25  [facility]  Surgeon:  Chan Bustamante MD   Anesthesia Type:  general with block  Procedure: COMPLEX PHACOEMULSIFICATION, CATARACT, WITH STANDARD INTRAOCULAR LENS IMPLANT INSERTION EXCISION OF CYCLITIC MEMBRANE AND SYNECHIALYSIS WITH POSSIBLE IRIS REPAIR MICROCATHETERIZATION, VISCODILATION OF SCHLEMM'S CANAL   Consult: ov 25    RECORDS REQUESTED FROM:       Most recent EKG+ Tracin24

## 2025-01-27 ENCOUNTER — PRE VISIT (OUTPATIENT)
Dept: SURGERY | Facility: CLINIC | Age: 40
End: 2025-01-27

## 2025-01-30 ENCOUNTER — PRE VISIT (OUTPATIENT)
Dept: SURGERY | Facility: CLINIC | Age: 40
End: 2025-01-30

## 2025-02-21 ENCOUNTER — LAB (OUTPATIENT)
Dept: LAB | Facility: CLINIC | Age: 40
End: 2025-02-21
Payer: COMMERCIAL

## 2025-02-21 ENCOUNTER — ANESTHESIA EVENT (OUTPATIENT)
Dept: SURGERY | Facility: AMBULATORY SURGERY CENTER | Age: 40
End: 2025-02-21
Payer: COMMERCIAL

## 2025-02-21 DIAGNOSIS — Z01.818 PREOP EXAMINATION: ICD-10-CM

## 2025-02-21 DIAGNOSIS — H26.30 TOXIC CATARACT: ICD-10-CM

## 2025-02-21 DIAGNOSIS — H21.41: ICD-10-CM

## 2025-02-21 LAB
ANION GAP SERPL CALCULATED.3IONS-SCNC: 7 MMOL/L (ref 7–15)
BUN SERPL-MCNC: 15.5 MG/DL (ref 6–20)
CALCIUM SERPL-MCNC: 9.4 MG/DL (ref 8.8–10.4)
CHLORIDE SERPL-SCNC: 108 MMOL/L (ref 98–107)
CREAT SERPL-MCNC: 0.84 MG/DL (ref 0.67–1.17)
EGFRCR SERPLBLD CKD-EPI 2021: >90 ML/MIN/1.73M2
GLUCOSE SERPL-MCNC: 111 MG/DL (ref 70–99)
HCO3 SERPL-SCNC: 25 MMOL/L (ref 22–29)
POTASSIUM SERPL-SCNC: 4.1 MMOL/L (ref 3.4–5.3)
SODIUM SERPL-SCNC: 140 MMOL/L (ref 135–145)

## 2025-02-21 PROCEDURE — 36415 COLL VENOUS BLD VENIPUNCTURE: CPT | Performed by: PATHOLOGY

## 2025-02-21 PROCEDURE — 80048 BASIC METABOLIC PNL TOTAL CA: CPT | Performed by: PATHOLOGY

## 2025-03-03 ENCOUNTER — ANESTHESIA (OUTPATIENT)
Dept: SURGERY | Facility: AMBULATORY SURGERY CENTER | Age: 40
End: 2025-03-03
Payer: COMMERCIAL

## 2025-03-03 ENCOUNTER — HOSPITAL ENCOUNTER (OUTPATIENT)
Facility: AMBULATORY SURGERY CENTER | Age: 40
Discharge: HOME | End: 2025-03-03
Attending: OPHTHALMOLOGY
Payer: COMMERCIAL

## 2025-03-03 VITALS
DIASTOLIC BLOOD PRESSURE: 72 MMHG | RESPIRATION RATE: 16 BRPM | OXYGEN SATURATION: 99 % | HEIGHT: 68 IN | TEMPERATURE: 97 F | WEIGHT: 227 LBS | BODY MASS INDEX: 34.4 KG/M2 | SYSTOLIC BLOOD PRESSURE: 113 MMHG | HEART RATE: 55 BPM

## 2025-03-03 DIAGNOSIS — H26.30 TOXIC CATARACT: ICD-10-CM

## 2025-03-03 DIAGNOSIS — H21.41: ICD-10-CM

## 2025-03-03 DIAGNOSIS — H40.211 ACUTE ANGLE-CLOSURE GLAUCOMA OF RIGHT EYE: ICD-10-CM

## 2025-03-03 DIAGNOSIS — H40.51X4: ICD-10-CM

## 2025-03-03 DIAGNOSIS — Z79.899 HIGH RISK MEDICATION USE: ICD-10-CM

## 2025-03-03 DIAGNOSIS — H20.11 CHRONIC IRIDOCYCLITIS OF RIGHT EYE: Primary | ICD-10-CM

## 2025-03-03 LAB — GLUCOSE BLDC GLUCOMTR-MCNC: 128 MG/DL (ref 70–99)

## 2025-03-03 PROCEDURE — 66174 TRLUML DIL AQ O/F CAN W/O ST: CPT | Mod: RT | Performed by: OPHTHALMOLOGY

## 2025-03-03 PROCEDURE — 88304 TISSUE EXAM BY PATHOLOGIST: CPT | Mod: TC | Performed by: OPHTHALMOLOGY

## 2025-03-03 PROCEDURE — 99000 SPECIMEN HANDLING OFFICE-LAB: CPT | Performed by: PATHOLOGY

## 2025-03-03 PROCEDURE — 82962 GLUCOSE BLOOD TEST: CPT | Performed by: PATHOLOGY

## 2025-03-03 PROCEDURE — 66982 XCAPSL CTRC RMVL CPLX WO ECP: CPT | Mod: RT

## 2025-03-03 PROCEDURE — C1889 IMPLANT/INSERT DEVICE, NOC: HCPCS | Mod: RT

## 2025-03-03 PROCEDURE — 66174 TRLUML DIL AQ O/F CAN W/O ST: CPT | Mod: RT

## 2025-03-03 PROCEDURE — 87798 DETECT AGENT NOS DNA AMP: CPT | Mod: 90 | Performed by: PATHOLOGY

## 2025-03-03 PROCEDURE — 66982 XCAPSL CTRC RMVL CPLX WO ECP: CPT | Mod: RT | Performed by: OPHTHALMOLOGY

## 2025-03-03 PROCEDURE — 88304 TISSUE EXAM BY PATHOLOGIST: CPT | Mod: 26 | Performed by: OPHTHALMOLOGY

## 2025-03-03 PROCEDURE — 87529 HSV DNA AMP PROBE: CPT | Mod: 90 | Performed by: PATHOLOGY

## 2025-03-03 PROCEDURE — 88313 SPECIAL STAINS GROUP 2: CPT | Mod: 26 | Performed by: OPHTHALMOLOGY

## 2025-03-03 PROCEDURE — 87496 CYTOMEG DNA AMP PROBE: CPT | Mod: 90 | Performed by: PATHOLOGY

## 2025-03-03 DEVICE — IMPLANTABLE DEVICE: Type: IMPLANTABLE DEVICE | Site: EYE | Status: FUNCTIONAL

## 2025-03-03 RX ORDER — SODIUM CHLORIDE, SODIUM LACTATE, POTASSIUM CHLORIDE, CALCIUM CHLORIDE 600; 310; 30; 20 MG/100ML; MG/100ML; MG/100ML; MG/100ML
INJECTION, SOLUTION INTRAVENOUS CONTINUOUS
Status: CANCELLED | OUTPATIENT
Start: 2025-03-03

## 2025-03-03 RX ORDER — BALANCED SALT SOLUTION 6.4; .75; .48; .3; 3.9; 1.7 MG/ML; MG/ML; MG/ML; MG/ML; MG/ML; MG/ML
SOLUTION OPHTHALMIC PRN
Status: DISCONTINUED | OUTPATIENT
Start: 2025-03-03 | End: 2025-03-03 | Stop reason: HOSPADM

## 2025-03-03 RX ORDER — FENTANYL CITRATE 50 UG/ML
50 INJECTION, SOLUTION INTRAMUSCULAR; INTRAVENOUS EVERY 5 MIN PRN
Status: DISCONTINUED | OUTPATIENT
Start: 2025-03-03 | End: 2025-03-04 | Stop reason: HOSPADM

## 2025-03-03 RX ORDER — PROPARACAINE HYDROCHLORIDE 5 MG/ML
1 SOLUTION/ DROPS OPHTHALMIC ONCE
Status: DISCONTINUED | OUTPATIENT
Start: 2025-03-03 | End: 2025-03-04 | Stop reason: HOSPADM

## 2025-03-03 RX ORDER — SODIUM CHLORIDE, SODIUM LACTATE, POTASSIUM CHLORIDE, CALCIUM CHLORIDE 600; 310; 30; 20 MG/100ML; MG/100ML; MG/100ML; MG/100ML
INJECTION, SOLUTION INTRAVENOUS CONTINUOUS
Status: DISCONTINUED | OUTPATIENT
Start: 2025-03-03 | End: 2025-03-04 | Stop reason: HOSPADM

## 2025-03-03 RX ORDER — MOXIFLOXACIN IN NACL,ISO-OS/PF 0.3MG/0.3
SYRINGE (ML) INTRAOCULAR PRN
Status: DISCONTINUED | OUTPATIENT
Start: 2025-03-03 | End: 2025-03-03 | Stop reason: HOSPADM

## 2025-03-03 RX ORDER — TRIAMCINOLONE ACETONIDE 40 MG/ML
INJECTION, SUSPENSION INTRA-ARTICULAR; INTRAMUSCULAR PRN
Status: DISCONTINUED | OUTPATIENT
Start: 2025-03-03 | End: 2025-03-03 | Stop reason: HOSPADM

## 2025-03-03 RX ORDER — CYCLOPENTOLAT/TROPIC/PHENYLEPH 1%-1%-2.5%
1 DROPS (EA) OPHTHALMIC (EYE)
Status: COMPLETED | OUTPATIENT
Start: 2025-03-03 | End: 2025-03-03

## 2025-03-03 RX ORDER — OXYCODONE HYDROCHLORIDE 5 MG/1
10 TABLET ORAL
Status: DISCONTINUED | OUTPATIENT
Start: 2025-03-03 | End: 2025-03-04 | Stop reason: HOSPADM

## 2025-03-03 RX ORDER — ONDANSETRON 2 MG/ML
4 INJECTION INTRAMUSCULAR; INTRAVENOUS EVERY 30 MIN PRN
Status: DISCONTINUED | OUTPATIENT
Start: 2025-03-03 | End: 2025-03-04 | Stop reason: HOSPADM

## 2025-03-03 RX ORDER — PROPOFOL 10 MG/ML
INJECTION, EMULSION INTRAVENOUS PRN
Status: DISCONTINUED | OUTPATIENT
Start: 2025-03-03 | End: 2025-03-03

## 2025-03-03 RX ORDER — KETOROLAC TROMETHAMINE 4 MG/ML
1 SOLUTION/ DROPS OPHTHALMIC 4 TIMES DAILY
Qty: 5 ML | Refills: 1 | Status: SHIPPED | OUTPATIENT
Start: 2025-03-03

## 2025-03-03 RX ORDER — HYDROMORPHONE HYDROCHLORIDE 1 MG/ML
0.4 INJECTION, SOLUTION INTRAMUSCULAR; INTRAVENOUS; SUBCUTANEOUS EVERY 5 MIN PRN
Status: DISCONTINUED | OUTPATIENT
Start: 2025-03-03 | End: 2025-03-04 | Stop reason: HOSPADM

## 2025-03-03 RX ORDER — LIDOCAINE 40 MG/G
CREAM TOPICAL
Status: DISCONTINUED | OUTPATIENT
Start: 2025-03-03 | End: 2025-03-04 | Stop reason: HOSPADM

## 2025-03-03 RX ORDER — ONDANSETRON 4 MG/1
4 TABLET, ORALLY DISINTEGRATING ORAL EVERY 30 MIN PRN
Status: DISCONTINUED | OUTPATIENT
Start: 2025-03-03 | End: 2025-03-04 | Stop reason: HOSPADM

## 2025-03-03 RX ORDER — NALOXONE HYDROCHLORIDE 0.4 MG/ML
0.1 INJECTION, SOLUTION INTRAMUSCULAR; INTRAVENOUS; SUBCUTANEOUS
Status: DISCONTINUED | OUTPATIENT
Start: 2025-03-03 | End: 2025-03-04 | Stop reason: HOSPADM

## 2025-03-03 RX ORDER — PROPARACAINE HYDROCHLORIDE 5 MG/ML
1 SOLUTION/ DROPS OPHTHALMIC ONCE
Status: COMPLETED | OUTPATIENT
Start: 2025-03-03 | End: 2025-03-03

## 2025-03-03 RX ORDER — DEXAMETHASONE SODIUM PHOSPHATE 4 MG/ML
INJECTION, SOLUTION INTRA-ARTICULAR; INTRALESIONAL; INTRAMUSCULAR; INTRAVENOUS; SOFT TISSUE PRN
Status: DISCONTINUED | OUTPATIENT
Start: 2025-03-03 | End: 2025-03-03 | Stop reason: HOSPADM

## 2025-03-03 RX ORDER — POVIDONE-IODINE 5 %
1 SOLUTION, NON-ORAL OPHTHALMIC (EYE) ONCE
Status: DISCONTINUED | OUTPATIENT
Start: 2025-03-03 | End: 2025-03-04 | Stop reason: HOSPADM

## 2025-03-03 RX ORDER — GLYCOPYRROLATE 0.2 MG/ML
INJECTION, SOLUTION INTRAMUSCULAR; INTRAVENOUS PRN
Status: DISCONTINUED | OUTPATIENT
Start: 2025-03-03 | End: 2025-03-03

## 2025-03-03 RX ORDER — TIMOLOL MALEATE 5 MG/ML
SOLUTION/ DROPS OPHTHALMIC PRN
Status: DISCONTINUED | OUTPATIENT
Start: 2025-03-03 | End: 2025-03-03 | Stop reason: HOSPADM

## 2025-03-03 RX ORDER — OXYCODONE HYDROCHLORIDE 5 MG/1
5 TABLET ORAL
Status: COMPLETED | OUTPATIENT
Start: 2025-03-03 | End: 2025-03-03

## 2025-03-03 RX ORDER — HYDROMORPHONE HYDROCHLORIDE 1 MG/ML
0.2 INJECTION, SOLUTION INTRAMUSCULAR; INTRAVENOUS; SUBCUTANEOUS EVERY 5 MIN PRN
Status: DISCONTINUED | OUTPATIENT
Start: 2025-03-03 | End: 2025-03-04 | Stop reason: HOSPADM

## 2025-03-03 RX ORDER — FENTANYL CITRATE 50 UG/ML
INJECTION, SOLUTION INTRAMUSCULAR; INTRAVENOUS PRN
Status: DISCONTINUED | OUTPATIENT
Start: 2025-03-03 | End: 2025-03-03

## 2025-03-03 RX ORDER — DEXAMETHASONE SODIUM PHOSPHATE 10 MG/ML
4 INJECTION, SOLUTION INTRAMUSCULAR; INTRAVENOUS
Status: DISCONTINUED | OUTPATIENT
Start: 2025-03-03 | End: 2025-03-04 | Stop reason: HOSPADM

## 2025-03-03 RX ORDER — PROPOFOL 10 MG/ML
INJECTION, EMULSION INTRAVENOUS CONTINUOUS PRN
Status: DISCONTINUED | OUTPATIENT
Start: 2025-03-03 | End: 2025-03-03

## 2025-03-03 RX ORDER — LIDOCAINE HYDROCHLORIDE 20 MG/ML
INJECTION, SOLUTION INFILTRATION; PERINEURAL PRN
Status: DISCONTINUED | OUTPATIENT
Start: 2025-03-03 | End: 2025-03-03

## 2025-03-03 RX ORDER — PREDNISOLONE ACETATE 10 MG/ML
1 SUSPENSION/ DROPS OPHTHALMIC 4 TIMES DAILY
Qty: 10 ML | Refills: 1 | Status: SHIPPED | OUTPATIENT
Start: 2025-03-03

## 2025-03-03 RX ORDER — TETRACAINE HYDROCHLORIDE 5 MG/ML
SOLUTION OPHTHALMIC PRN
Status: DISCONTINUED | OUTPATIENT
Start: 2025-03-03 | End: 2025-03-03 | Stop reason: HOSPADM

## 2025-03-03 RX ORDER — ONDANSETRON 2 MG/ML
INJECTION INTRAMUSCULAR; INTRAVENOUS PRN
Status: DISCONTINUED | OUTPATIENT
Start: 2025-03-03 | End: 2025-03-03

## 2025-03-03 RX ORDER — CYCLOPENTOLAT/TROPIC/PHENYLEPH 1%-1%-2.5%
1 DROPS (EA) OPHTHALMIC (EYE)
Status: DISCONTINUED | OUTPATIENT
Start: 2025-03-03 | End: 2025-03-04 | Stop reason: HOSPADM

## 2025-03-03 RX ORDER — FENTANYL CITRATE 50 UG/ML
25 INJECTION, SOLUTION INTRAMUSCULAR; INTRAVENOUS EVERY 5 MIN PRN
Status: DISCONTINUED | OUTPATIENT
Start: 2025-03-03 | End: 2025-03-04 | Stop reason: HOSPADM

## 2025-03-03 RX ORDER — MOXIFLOXACIN 5 MG/ML
1 SOLUTION/ DROPS OPHTHALMIC 3 TIMES DAILY
Qty: 3 ML | Refills: 1 | Status: SHIPPED | OUTPATIENT
Start: 2025-03-03

## 2025-03-03 RX ORDER — ACETAMINOPHEN 325 MG/1
975 TABLET ORAL ONCE
Status: COMPLETED | OUTPATIENT
Start: 2025-03-03 | End: 2025-03-03

## 2025-03-03 RX ADMIN — Medication 1 DROP: at 09:46

## 2025-03-03 RX ADMIN — PROPARACAINE HYDROCHLORIDE 1 DROP: 5 SOLUTION/ DROPS OPHTHALMIC at 09:46

## 2025-03-03 RX ADMIN — OXYCODONE HYDROCHLORIDE 5 MG: 5 TABLET ORAL at 13:39

## 2025-03-03 RX ADMIN — PROPOFOL 300 MCG/KG/MIN: 10 INJECTION, EMULSION INTRAVENOUS at 11:26

## 2025-03-03 RX ADMIN — Medication 1 DROP: at 09:51

## 2025-03-03 RX ADMIN — SODIUM CHLORIDE, SODIUM LACTATE, POTASSIUM CHLORIDE, CALCIUM CHLORIDE: 600; 310; 30; 20 INJECTION, SOLUTION INTRAVENOUS at 11:21

## 2025-03-03 RX ADMIN — ONDANSETRON 4 MG: 2 INJECTION INTRAMUSCULAR; INTRAVENOUS at 12:02

## 2025-03-03 RX ADMIN — PROPOFOL 300 MG: 10 INJECTION, EMULSION INTRAVENOUS at 11:26

## 2025-03-03 RX ADMIN — FENTANYL CITRATE 100 MCG: 50 INJECTION, SOLUTION INTRAMUSCULAR; INTRAVENOUS at 11:32

## 2025-03-03 RX ADMIN — LIDOCAINE HYDROCHLORIDE 100 MG: 20 INJECTION, SOLUTION INFILTRATION; PERINEURAL at 11:26

## 2025-03-03 RX ADMIN — ACETAMINOPHEN 975 MG: 325 TABLET ORAL at 09:50

## 2025-03-03 RX ADMIN — Medication 1 DROP: at 09:56

## 2025-03-03 RX ADMIN — GLYCOPYRROLATE 0.1 MG: 0.2 INJECTION, SOLUTION INTRAMUSCULAR; INTRAVENOUS at 11:43

## 2025-03-03 NOTE — OP NOTE
PREOPERATIVE DIAGNOSIS:  1. Combined cataract, right eye   2. Open angle glaucoma, indeterminate stage, right eye   3. Cyclytic membrane, right eye   4. Chronic anterior uveitis, right eye     POSTOPERATIVE DIAGNOSIS: Same     PROCEDURES:   1. Complex cataract extraction with intraocular lens implant right eye   2. Anterior chamber tap, right eye   3. Synechialysis, right eye   4. Excision of cyclytic membrane, right eye   5. Goniosynechialysis with viscodilation of Schlemm's Canal, right eye     SURGEON: Chan Bustamante M.D.  Assistant: Yadira Payan M.D.         INDICATIONS: The patient Power Blanco presented to the eye clinic with decreased vision secondary to cataract and glaucoma in the right eye. The risks, benefits and alternatives to surgery were discussed. The patient elected to proceed. All questions were answered to the patient's satisfaction.     DESCRIPTION OF PROCEDURE:  Prior to the procedure, appropriate cardiac and respiratory monitors were applied to the patient. In the pre-operative holding area, a drop of topical tetracaine followed by lidocaine gel followed by povidone iodine.  The patient was brought to the operating room where a surgical pause was carried out to identify with all members of the surgical team the correct surgical site.  He was placed under general anesthesia and the airway secured.  The right eye was prepped and draped in the usual sterile fashion.  A lid speculum was placed, and the operating microscope was rotated into position. Paracenteses were created.  Aqueous fluid was aspirated and sent for viral PCR.  The anterior chamber was inflated with viscoelastic. Synechiae were lysed in a combination of blunt and sharp dissection.  A dense cyclitic membrane was meticulously dissected and excised from pupil and interior iris.  The membrane was removed from the anterior chamber and placed in formalin for pathologic examination.      A temporal wound was created at the limbus  using a 2.5 mm blade. A capsulorrhexis was initiated using a bent 25-gauge needle and was completed in continuous and circular fashion using the capsulorrhexis forceps. The lens nucleus was hydrodissected using balanced salt solution.  The lens nucleus was rotated and removed using phacoemulsification in a stop and chop technique.  Residual cortical material was removed using irrigation-aspiration.  A Kuglen hook was used to manipulate the irregular/miotic iris to ensure all lens material was removed.  The capsular bag was reinflated to its maximal extent with viscoelastic.  A 24.5 diopter SY60WF was inserted into the capsular bag.  The lens power selected was reviewed using the intraocular lens power measurements that were obtained preoperatively to confirm that the correct lens was selected for the desired post-operative refractive state.  The patient and the operating microscope were repositioned to allow for direct gonioscopy.  A [place]-Sree lens was placed to visualize the angle anatomy.  Broad synechiae was lysed using a microforceps.  The intended insertion site at the trabecular meshwork was identified.  The Schlemm's canal was cannulated and the OMNI was gently advanced into the canal to provided 360 degree viscodilation.  A 10-0 nylon suture was placed in the temporal incision.  The remaining viscoelastic was removed from the anterior chamber in its entirety, and the wounds were hydrated and found to be self-sealing.  The suture was tied and knot buried.  Intracameral moxifloxacin and triamcinolone were administered. Tactile pressure was confirmed to be in a normal range.  The lid speculum was removed and a patch and shield were applied.  The patient tolerated the procedure well, and there were no complications.     PLAN: The patient will be discharged to home and will follow up tomorrow morning in the eye clinic.  EBL:  None  Complications:  2-3 mL     Implant Name Type Inv. Item Serial No.   Lot No. LRB No. Used Action   LENS SY60WF 24.5 [employer] BLF ASPHERIC BICONVEX IOL - [id number] Lens/Eye Implant LENS SY60WF 24.5 CL[employer] BLF ASPHERIC BICONVEX IOL [id number] [employer]  Right 1 Implanted   Omni surgicalsystem     [id number] Right 1 Implanted             Attending Physician Procedure Attestation: I was present for the entire procedure       Chan Bustamante MD  , Comprehensive Ophthalmology  Department of Ophthalmology and Visual Neurosciences  [facility]

## 2025-03-03 NOTE — ANESTHESIA PROCEDURE NOTES
Airway       Patient location during procedure: OR  Staff -        Performed By: CRNA  Consent for Airway        Urgency: elective  Indications and Patient Condition       Indications for airway management: marilia-procedural       Induction type:intravenous       Mask difficulty assessment: 0 - not attempted    Final Airway Details       Final airway type: supraglottic airway    Supraglottic Airway Details        Type: LMA       Brand: I-Gel       LMA size: 5    Post intubation assessment        Placement verified by: capnometry, equal breath sounds and chest rise        Number of attempts at approach: 1       Secured with: tape       Ease of procedure: easy       Dentition: Unchanged and Intact

## 2025-03-03 NOTE — ANESTHESIA CARE TRANSFER NOTE
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    Anesthesia Type:   General     Note:    Oropharynx: oral airway in place  Level of Consciousness: unresponsive  Oxygen Supplementation: face mask  Level of Supplemental Oxygen (L/min / FiO2): 6  Independent Airway: airway patency satisfactory and stable  Dentition: dentition unchanged  Vital Signs Stable: post-procedure vital signs reviewed and stable  Report to RN Given: handoff report given  Patient transferred to: PACU    Handoff Report: Identifed the Patient, Identified the Reponsible Provider, Reviewed the pertinent medical history, Discussed the surgical course, Reviewed Intra-OP anesthesia mangement and issues during anesthesia, Set expectations for post-procedure period and Allowed opportunity for questions and acknowledgement of understanding      Vitals:  Vitals Value Taken Time   /68 03/03/25 1300   Temp 36.2  C (97.2  F) 03/03/25 1300   Pulse 66 03/03/25 1300   Resp 18 03/03/25 1300   SpO2 98 % 03/03/25 1300       Electronically Signed By: JOYCELYN Prince CRNA  March 3, 2025  1:01 PM

## 2025-03-03 NOTE — DISCHARGE INSTRUCTIONS
[facility]  Home Care Following Anesthesia  For 24 hours after surgery:  Get plenty of rest.  A responsible adult must stay with you for at least 24 hours after you leave the surgery center.  Do not drive or use heavy equipment.  If you have weakness or tingling, don't drive or use heavy equipment until this feeling goes away.   Do not drink alcohol.   Avoid strenuous or risky activities.  Ask for help when climbing stairs.  You may feel lightheaded.  IF so, sit for a few minutes before standing.  Have someone help you get up.   If you have nausea (feel sick to your stomach): Drink only clear liquids such as apple juice, ginger ale, broth or 7-Up.  Rest may also help.  Be sure to drink enough fluids.  Move to a regular diet as you feel able.   You may have a slight fever.  Call the doctor if your fever is over 100 F (37.7 C) (taken under the tongue) or lasts longer than 24 hours.  You may have a dry mouth, a sore throat, muscle aches or trouble sleeping. These should go away after 24 hours.  Do not make important or legal decisions.   It is recommended to avoid smoking.               Tips for taking pain medications  To get the best pain relief possible, remember these points:  Take pain medications as directed, before pain becomes severe.  Pain medication can upset your stomach: taking it with food may help.  Constipation is a common side effect of pain medication. Drink plenty of  fluids.  Eat foods high in fiber. Take a stool softener if recommended by your doctor or pharmacist.  Do not drink alcohol, drive or operate machinery while taking pain medications.  Ask about other ways to control pain, such as with heat, ice or relaxation.    Tylenol/Acetaminophen Consumption    If you feel your pain relief is insufficient, you may take Tylenol/Acetaminophen in addition to your narcotic pain medication.   Be careful not to exceed 4,000 mg of Tylenol/Acetaminophen in a 24 hour  period from all sources.  If you are taking extra strength Tylenol/acetaminophen (500 mg), the maximum dose is 8 tablets in 24 hours.  If you are taking regular strength acetaminophen (325 mg), the maximum dose is 12 tablets in 24 hours.    Call a doctor for any of the following:  Signs of infection (fever, growing tenderness at the surgery site, a large amount of drainage or bleeding, severe pain, foul-smelling drainage, redness, swelling).  It has been over 8 to 10 hours since surgery and you are still not able to urinate (pass water).  Headache for over 24 hours.  Numbness, tingling or weakness the day after surgery (if you had spinal anesthesia).  Signs of Covid-19 infection (temperature over 100 degrees, shortness of breath, cough, loss of taste/smell, generalized body aches, persistent headache, chills, sore throat, nausea/vomiting/diarrhea)    Your doctor is:       Dr. Chan Bustamante, Ophthalmology: [phone]               After hours and weekends call the hospital @ [phone] and ask for the resident on call for:  Ophthalmology  For emergency care, call the:  [facility]:  [phone] (TTY for hearing impaired: [phone])

## 2025-03-03 NOTE — ANESTHESIA PREPROCEDURE EVALUATION
Anesthesia Pre-Procedure Evaluation    Patient: Power Blanco   MRN: [id number] : 1985        Procedure : Procedure(s):  RIGHT EYE COMPLEX PHACOEMULSIFICATION, CATARACT, WITH STANDARD INTRAOCULAR LENS IMPLANT INSERTION  EXCISION OF CYCLITIC MEMBRANE AND SYNECHIALYSIS WITH POSSIBLE IRIS REPAIR  MICROCATHETERIZATION, VISCODILATION OF SCHLEMM'S CANAL          Past Medical History:   Diagnosis Date    Acute angle-closure glaucoma of right eye     Chronic iridocyclitis of right eye     Diabetes mellitus, type 2 (H)     Glaucoma (increased eye pressure)     Pupillary seclusion of right eye       Past Surgical History:   Procedure Laterality Date    CHOLECYSTECTOMY  2022    LASER DIODE CILIARY LESIONS Right 3/31/2024    Procedure: Cyclophotocoagulation;  Surgeon: Milton Wolf MD;  Location: [facility]      Allergies   Allergen Reactions    Droperidol Anxiety and Other (See Comments)      Social History     Tobacco Use    Smoking status: Some Days     Types: Cigarettes, Cigars     Passive exposure: Current    Smokeless tobacco: Never    Tobacco comments:     Cigars - one a day    Substance Use Topics    Alcohol use: Yes     Comment: 1-2 drinks on the weekends - every other      Wt Readings from Last 1 Encounters:   25 103 kg (227 lb)        Anesthesia Evaluation   Pt has had prior anesthetic.     No history of anesthetic complications       ROS/MED HX  ENT/Pulmonary:  - neg pulmonary ROS     Neurologic:  - neg neurologic ROS     Cardiovascular:  - neg cardiovascular ROS     METS/Exercise Tolerance: >4 METS    Hematologic:  - neg hematologic  ROS     Musculoskeletal:  - neg musculoskeletal ROS     GI/Hepatic:  - neg GI/hepatic ROS     Renal/Genitourinary:  - neg Renal ROS     Endo:  - neg endo ROS     Psychiatric/Substance Use:  - neg psychiatric ROS     Infectious Disease:  - neg infectious disease ROS     Malignancy:  - neg malignancy ROS     Other:  - neg other ROS          Physical Exam    Airway      "   Mallampati: II   TM distance: > 3 FB   Neck ROM: full   Mouth opening: > 3 cm    Respiratory Devices and Support         Dental       (+) Completely normal teeth      Cardiovascular   cardiovascular exam normal          Pulmonary   pulmonary exam normal                OUTSIDE LABS:  CBC:   Lab Results   Component Value Date    WBC 12.1 (H) 07/04/2024    WBC 6.7 03/22/2024    HGB 14.0 07/04/2024    HGB 16.8 03/22/2024    HCT 39.6 (L) 07/04/2024    HCT 49.0 03/22/2024     07/04/2024     03/22/2024     BMP:   Lab Results   Component Value Date     02/21/2025     07/04/2024    POTASSIUM 4.1 02/21/2025    POTASSIUM 3.2 (L) 07/04/2024    CHLORIDE 108 (H) 02/21/2025    CHLORIDE 106 07/04/2024    CO2 25 02/21/2025    CO2 23 07/04/2024    BUN 15.5 02/21/2025    BUN 16.2 07/04/2024    CR 0.84 02/21/2025    CR 0.75 07/04/2024     (H) 03/03/2025     (H) 02/21/2025     COAGS: No results found for: \"PTT\", \"INR\", \"FIBR\"  POC: No results found for: \"BGM\", \"HCG\", \"HCGS\"  HEPATIC:   Lab Results   Component Value Date    ALBUMIN 4.4 07/04/2024    PROTTOTAL 7.0 07/04/2024    ALT 28 07/04/2024    AST 23 07/04/2024    ALKPHOS 42 07/04/2024    BILITOTAL 1.5 (H) 07/04/2024     OTHER:   Lab Results   Component Value Date    NIKI 9.4 02/21/2025    LIPASE 27 07/04/2024       Anesthesia Plan    ASA Status:  1    NPO Status:  NPO Appropriate    Anesthesia Type: General.     - Airway: LMA   Induction: Propofol, Intravenous.   Maintenance: TIVA.        Consents    Anesthesia Plan(s) and associated risks, benefits, and realistic alternatives discussed. Questions answered and patient/representative(s) expressed understanding.     - Discussed:     - Discussed with:  Patient            Postoperative Care    Pain management: IV analgesics, Oral pain medications, Multi-modal analgesia.   PONV prophylaxis: Ondansetron (or other 5HT-3), Dexamethasone or Solumedrol, Background Propofol Infusion " "    Comments:    Other Comments: Discussed risks of general anesthesia, including aspiration pneumonia, sore throat/hoarse voice, abrasions/damage to lips/tongue/teeth, nausea, rare complications (including medication reactions, cardiac, pulmonary, hypoxia/low oxygen, recall). Ensured understanding, invited questions and all questions were answered. Patient wishes to proceed.               Lamont Jain MD    I have reviewed the pertinent notes and labs in the chart from the past 30 days and (re)examined the patient.  Any updates or changes from those notes are reflected in this note.    Clinically Significant Risk Factors Present on Admission                             # Obesity: Estimated body mass index is 34.52 kg/m  as calculated from the following:    Height as of this encounter: 1.727 m (5' 8\").    Weight as of this encounter: 103 kg (227 lb).                "

## 2025-03-03 NOTE — ANESTHESIA POSTPROCEDURE EVALUATION
Patient: Power Blanco    Procedure: Procedure(s):  RIGHT EYE COMPLEX PHACOEMULSIFICATION, CATARACT, WITH STANDARD INTRAOCULAR LENS IMPLANT INSERTION  EXCISION OF CYCLITIC MEMBRANE AND SYNECHIALYSIS  GONIOSYNECHIALYSIS AND MICROCATHETERIZATION, VISCODILATION OF SCHLEMM'S CANAL;       Anesthesia Type:  General    Note:  Disposition: Outpatient   Postop Pain Control: Uneventful            Sign Out: Well controlled pain   PONV: No   Neuro/Psych: Uneventful            Sign Out: Acceptable/Baseline neuro status   Airway/Respiratory: Uneventful            Sign Out: Acceptable/Baseline resp. status   CV/Hemodynamics: Uneventful            Sign Out: Acceptable CV status; No obvious hypovolemia; No obvious fluid overload   Other NRE: NONE   DID A NON-ROUTINE EVENT OCCUR? No           Last vitals:  Vitals Value Taken Time   /68 03/03/25 1300   Temp 36.2  C (97.2  F) 03/03/25 1300   Pulse 66 03/03/25 1300   Resp 18 03/03/25 1300   SpO2 98 % 03/03/25 1300       Electronically Signed By: Lamont Jain MD  March 3, 2025  1:01 PM

## 2025-03-04 ENCOUNTER — OFFICE VISIT (OUTPATIENT)
Dept: OPHTHALMOLOGY | Facility: CLINIC | Age: 40
End: 2025-03-04
Attending: OPHTHALMOLOGY
Payer: COMMERCIAL

## 2025-03-04 DIAGNOSIS — H40.10X4: ICD-10-CM

## 2025-03-04 DIAGNOSIS — H20.11 CHRONIC ANTERIOR UVEITIS OF RIGHT EYE: ICD-10-CM

## 2025-03-04 DIAGNOSIS — Z98.890 POSTSURGICAL STATE, EYE: Primary | ICD-10-CM

## 2025-03-04 LAB
PATH REPORT.COMMENTS IMP SPEC: NORMAL
PATH REPORT.COMMENTS IMP SPEC: NORMAL
PATH REPORT.FINAL DX SPEC: NORMAL
PATH REPORT.GROSS SPEC: NORMAL
PATH REPORT.MICROSCOPIC SPEC OTHER STN: NORMAL
PATH REPORT.RELEVANT HX SPEC: NORMAL
PHOTO IMAGE: NORMAL

## 2025-03-04 PROCEDURE — G0463 HOSPITAL OUTPT CLINIC VISIT: HCPCS | Performed by: OPHTHALMOLOGY

## 2025-03-04 ASSESSMENT — TONOMETRY
OS_IOP_MMHG: 28
OD_IOP_MMHG: 12
IOP_METHOD: TONOPEN

## 2025-03-04 ASSESSMENT — SLIT LAMP EXAM - LIDS: COMMENTS: NORMAL

## 2025-03-04 ASSESSMENT — VISUAL ACUITY
OD_PH_SC: 20/100
OD_SC: 20/125
METHOD: SNELLEN - LINEAR
OS_SC+: -2
OD_PH_SC+: -1
OS_SC: 20/25

## 2025-03-04 ASSESSMENT — EXTERNAL EXAM - LEFT EYE: OS_EXAM: NORMAL

## 2025-03-04 ASSESSMENT — EXTERNAL EXAM - RIGHT EYE: OD_EXAM: NORMAL

## 2025-03-04 NOTE — LETTER
March 4, 2025      Re: Power Blanco   1985    To Whom It May Concern:    This is to confirm that the above patient was seen on 3/4/2025.  Power Blanco is unable to return to work for one week.  At that time he may resume without restrictions    Thank you for your cooperation in this matter.  Please do not hesitate to contact me if you have any further questions.    Sincerely,      Chan Bustamante MD  , Comprehensive Ophthalmology  Department of Ophthalmology and Visual Neurosciences  [facility]

## 2025-03-04 NOTE — NURSING NOTE
Chief Complaints and History of Present Illnesses   Patient presents with    Post Op (Ophthalmology) Right Eye     Complex Cataract extraction with IOL in the right eye on 03/03/2025        Chief Complaint(s) and History of Present Illness(es)       Post Op (Ophthalmology) Right Eye              Laterality: right eye    Associated symptoms: eye pain, tearing, headache and photophobia    Pain scale: 4/10    Comments: Complex Cataract extraction with IOL in the right eye on 03/03/2025                 Comments    He states that he is having some eye pain and a headache.  The pain has improved since last night.  Patch was removed in office.  Initial vision is blurred.    FREDI Leonard 9:09 AM  March 4, 2025

## 2025-03-04 NOTE — LETTER
March 4, 2025      Re: Power Blanco   1985    To Whom It May Concern:    This is to confirm that the above patient had eye surgery on 3/3/2025.  Power Blanco is unable to return to work until 3/7/2025.  At that time he may resume without restrictions    Thank you for your cooperation in this matter.  Please do not hesitate to contact me if you have any further questions.    Sincerely,      Chan Bustamante MD  , Comprehensive Ophthalmology  Department of Ophthalmology and Visual Neurosciences  [facility]

## 2025-03-04 NOTE — PATIENT INSTRUCTIONS
-Start Pred Forte four times per day right eye  -Start Vigamox four times per day right eye  -Start Ketorolac four times per day right eye  -Continue acetazolamide 500mg daily with omeprazole as needed  -Continue Valtrex 1g three times per day  -Continue Prednisone 30mg daily  -Follow up as scheduled with Dr. Villagomez 3/11/25  -Return in 1 week for POD7 check

## 2025-03-04 NOTE — PROGRESS NOTES
HPI       Post Op (Ophthalmology) Right Eye    In right eye.  Associated symptoms include eye pain, tearing, headache and photophobia.  Pain was noted as 4/10. Additional comments: Complex Cataract extraction with IOL in the right eye on 03/03/2025                Comments    He states that he is having some eye pain and a headache.  The pain has improved since last night.  Patch was removed in office.  Initial vision is blurred.    FREDI Leonard 9:09 AM  March 4, 2025               Last edited by Sonya Gamble COT on 3/4/2025  9:09 AM.          Review of systems for the eyes was negative other than the pertinent positives/negatives listed in the HPI.      Assessment & Plan      Power Blanco is a 39 year old male with the following diagnoses:   1. Postsurgical state, eye    2. Chronic anterior uveitis of right eye    3. Open-angle glaucoma of right eye, indeterminate stage, unspecified open-angle glaucoma type         Postoperative day 1 s/p cataract extraction + intraocular lens, anterior chamber tap, membrane excision, synechialysis, GSL, OMNI 360  S/P Laser peripheral iridotomy (LPI) in both eyes (aborted due to discomfort)  S/P tsCPC right eye 3/31/24 with marked PostOp inflammation and hyphema  S/P right eye CE/PCIOL, synechialysis right eye, excision of cyclic membrane right eye, goniosynechialysis w/ viscodilation of Schlemm's Canal Right eye 3/3/25 with Dr. Bustamante  Currently on Valtrex 1000mg TID, diamox 500mg daily, prednisone 30 mg daily     CMV PCR: In process  Toxoplasma Gondi PCR: In process  VZV PCR: In process  HSV1/2 PCR: In Process  Membrane Pathology: In process    Doing well  Intraocular pressure greatly improved so far  Keep patch in place at night for 5 days  Start post-operative drops according to instructions below  Post-operative do's and don'ts reviewed, questions answered      Plan:  -Start PF QID right eye  -Start Vigamox QID right eye  -Start Ketorolac QID right eye  -Continue  acetazolamide 500mg daily with omeprazole as needed  -Continue Valtrex 1g TID  -Continue Prednisone 30mg daily    -Follow up as scheduled with Dr. Villagomez 3/11/25 (Robby same day as needed)      Devon Teresa MD  Resident Physician, PGY-2  Department of Ophthalmology  March 4, 2025     Attending Physician Attestation:  Complete documentation of historical and exam elements from today's encounter can be found in the full encounter summary report (not reduplicated in this progress note).  I personally obtained the chief complaint(s) and history of present illness.  I confirmed and edited as necessary the review of systems, past medical/surgical history, family history, social history, and examination findings as documented by others; and I examined the patient myself.  I personally reviewed the relevant tests, images, and reports as documented above.  I formulated and edited as necessary the assessment and plan and discussed the findings and management plan with the patient and family. . - Chan Bustamante MD

## 2025-03-11 ENCOUNTER — OFFICE VISIT (OUTPATIENT)
Dept: OPHTHALMOLOGY | Facility: CLINIC | Age: 40
End: 2025-03-11
Attending: OPHTHALMOLOGY
Payer: COMMERCIAL

## 2025-03-11 DIAGNOSIS — H20.11 CHRONIC ANTERIOR UVEITIS OF RIGHT EYE: Primary | ICD-10-CM

## 2025-03-11 DIAGNOSIS — H40.032: ICD-10-CM

## 2025-03-11 DIAGNOSIS — Z79.899 HIGH RISK MEDICATION USE: ICD-10-CM

## 2025-03-11 DIAGNOSIS — H40.2212 CHRONIC ANGLE-CLOSURE GLAUCOMA OF EYE, RIGHT, MODERATE STAGE: ICD-10-CM

## 2025-03-11 DIAGNOSIS — Z98.83 STATUS POST GLAUCOMA SURGERY: ICD-10-CM

## 2025-03-11 PROCEDURE — G0463 HOSPITAL OUTPT CLINIC VISIT: HCPCS | Performed by: OPHTHALMOLOGY

## 2025-03-11 ASSESSMENT — CONF VISUAL FIELD
OD_SUPERIOR_TEMPORAL_RESTRICTION: 0
OD_INFERIOR_TEMPORAL_RESTRICTION: 0
OS_INFERIOR_NASAL_RESTRICTION: 0
OS_NORMAL: 1
OD_INFERIOR_NASAL_RESTRICTION: 0
OS_INFERIOR_TEMPORAL_RESTRICTION: 0
OS_SUPERIOR_NASAL_RESTRICTION: 0
OD_SUPERIOR_NASAL_RESTRICTION: 0
OS_SUPERIOR_TEMPORAL_RESTRICTION: 0
OD_NORMAL: 1

## 2025-03-11 ASSESSMENT — VISUAL ACUITY
OD_PH_SC+: +1
METHOD: SNELLEN - LINEAR
OD_SC: 20/125
OS_SC: 20/25
OD_PH_SC: 20/50

## 2025-03-11 ASSESSMENT — EXTERNAL EXAM - RIGHT EYE: OD_EXAM: NORMAL

## 2025-03-11 ASSESSMENT — TONOMETRY
OD_IOP_MMHG: 19
OS_IOP_MMHG: 20
IOP_METHOD: TONOPEN

## 2025-03-11 ASSESSMENT — EXTERNAL EXAM - LEFT EYE: OS_EXAM: NORMAL

## 2025-03-11 ASSESSMENT — SLIT LAMP EXAM - LIDS: COMMENTS: NORMAL

## 2025-03-11 ASSESSMENT — CUP TO DISC RATIO
OD_RATIO: 0.5
OS_RATIO: 0.4

## 2025-03-11 NOTE — LETTER
March 11, 2025      Re: Power Blanco   1985    To Whom It May Concern:    This is to confirm that the above patient was seen on 3/11/2025.  Power Blanco is able to return to work today. Thank you for allowing time for this necessary medical appointment     Thank you for your cooperation in this matter.  Please do not hesitate to contact me if you have any further questions.    Sincerely,      NATO URBANO

## 2025-03-11 NOTE — LETTER
3/11/2025       RE: Power Blanco  [address]  [place] [facility] [address]     Dear Power Blanco:    Thank you for your visit to the [facility] at [facility]. Please see a copy of my visit note below.    Chief Complaint/Presenting Concern:  Uveitis post op    Interval History of Present Ocular Illness:  Power Blanco is a 39 year old patient who returns for follow up of his uveitis after cataract surgery with OMNI last week. Power noted the right eye was doing well for a few days and started getting a bit uncomfortable a few days ago. There have been some challenges focusing and the prednisolone drops causes some haziness with use.    Interval Updates to Medical/Family/Social History:  Work forms got sorted out okay.     Relevant Review of Systems Updates:  No regular headaches     Current eye related medications: Prednisolone 4x/day right eye, Vigamox 3x/day right eye, Ketorolac 4x/day right eye, acetazolamide 500 mg once daily, Valtrex 1000 mg once daily, Prednisone 30mg daily,omeprazole as needed     No Imaging today    3/3/25: Pathology from cataract surgery right eye: Cyclitic membrane, right, excision: Fibrous membrane with adherent pigment.     Assessment:     1. Chronic anterior uveitis of right eye (Primary)  Doing well one week     2. Chronic angle-closure glaucoma of eye, right, moderate stage    3. Status post glaucoma surgery  IOP 19    4. Angle-closure glaucoma suspect of left eye  IOP 20 with patent LPI    5. High risk medication use  Acetazolamide 500 mg once daily, Valtrex 1000 mg once daily, Prednisone 30mg daily    Plan/Recommendations:  *Post op visit*  Discussed findings with patient and his wfe. The right eye is healing very well after surgery with mild inflammation and resolving blood in the eye. The steroid crystals have been absorbed. WE can begin tapering  Eye pressure is 19,20. We should continue Diamox  unchanged  Continue these pills unchanged: Acetazolamide 500 mg once daily, Valtrex 1000 mg once daily (no need to increase),omeprazole as needed   For the prednisone tablets, take 30 mg today and for 2 more days. Then on Friday, 3/14, reduce to 20 mg each AM and do that each for 2 weeks (until Friday, 3/28). Then on 3/29, go back to just one pill 10 mg daily  For the beige top (moxifloxacin), let's continue 3x/day through Thursday, 3/13/25, then stop  For the pink top (prednisolone) and gray top (ketorolac): Please continue 4x/day through Thursday, 3/13/25. Beginning on Friday, 3/14/25, go down to 3x/day for 2 weeks. Then beginning 3/29, go back to 2x/day in each eye until next visit  Okay to use shield and sleep upright tonight and two more nights. Then starting 3/14/25, no need for shield. Please wear shades to be safe    RTC 4/1/25 with Dr. Robby STATON likely late April     Physician Attestation    Attending Physician Attestation:  Complete documentation of historical and exam elements from today's encounter can be found in the full encounter summary report (not reduplicated in this progress note). I personally obtained the chief complaint(s) and history of present illness. I confirmed and edited as necessary the review of systems, past medical/surgical history, family history, social history, and examination findings as documented by others; and I examined the patient myself. I personally reviewed the relevant tests, images, and reports as documented above. I formulated and edited as necessary the assessment and plan and discussed the findings and management plan with the patient and family members present at the time of this visit.  Devon Villagomez M.D., Uveitis and Medical Retina, March 11, 2025     Sincerely,    Devon Villagomez MD  Uveitis and Medical Retina    Department of Ophthalmology & Visual Neurosc[facility]  [facility]

## 2025-03-11 NOTE — PROGRESS NOTES
Chief Complaint/Presenting Concern:  Uveitis post op    Interval History of Present Ocular Illness:  Power Blanco is a 39 year old patient who returns for follow up of his uveitis after cataract surgery with OMNI last week. Power noted the right eye was doing well for a few days and started getting a bit uncomfortable a few days ago. There have been some challenges focusing and the prednisolone drops causes some haziness with use.    Interval Updates to Medical/Family/Social History:  Work forms got sorted out okay.     Relevant Review of Systems Updates:  No regular headaches     Current eye related medications: Prednisolone 4x/day right eye, Vigamox 3x/day right eye, Ketorolac 4x/day right eye, acetazolamide 500 mg once daily, Valtrex 1000 mg once daily, Prednisone 30mg daily,omeprazole as needed     No Imaging today    3/3/25: Pathology from cataract surgery right eye: Cyclitic membrane, right, excision: Fibrous membrane with adherent pigment.     Assessment:     1. Chronic anterior uveitis of right eye (Primary)  Doing well one week     2. Chronic angle-closure glaucoma of eye, right, moderate stage  3. Status post glaucoma surgery  IOP 19    4. Angle-closure glaucoma suspect of left eye  IOP 20 with patent LPI    5. High risk medication use  Acetazolamide 500 mg once daily, Valtrex 1000 mg once daily, Prednisone 30mg daily    Plan/Recommendations:  *Post op visit*  Discussed findings with patient and his Wife. The right eye is healing very well after surgery with mild inflammation and resolving blood in the eye. The steroid crystals have been absorbed. WE can begin tapering  Eye pressure is 19,20. We should continue Diamox unchanged  Continue these pills unchanged: Acetazolamide 500 mg once daily, Valtrex 1000 mg once daily (no need to increase),omeprazole as needed   For the prednisone tablets, take 30 mg today and for 2 more days. Then on Friday, 3/14, reduce to 20 mg each AM and do that each for 2 weeks  (until Friday, 3/28). Then on 3/29, go back to just one pill 10 mg daily  For the beige top (moxifloxacin), let's continue 3x/day through Thursday, 3/13/25, then stop  For the pink top (prednisolone) and gray top (ketorolac): Please continue 4x/day through Thursday, 3/13/25. Beginning on Friday, 3/14/25, go down to 3x/day for 2 weeks. Then beginning 3/29, go back to 2x/day in each eye until next visit  Okay to use shield and sleep upright tonight and two more nights. Then starting 3/14/25, no need for shield. Please wear shades to be safe    RTC 4/1/25 with Dr. Robby STATON likely late April     Physician Attestation     Attending Physician Attestation:  Complete documentation of historical and exam elements from today's encounter can be found in the full encounter summary report (not reduplicated in this progress note). I personally obtained the chief complaint(s) and history of present illness. I confirmed and edited as necessary the review of systems, past medical/surgical history, family history, social history, and examination findings as documented by others; and I examined the patient myself. I personally reviewed the relevant tests, images, and reports as documented above. I formulated and edited as necessary the assessment and plan and discussed the findings and management plan with the patient and family members present at the time of this visit.  Devon Villagomez M.D., Uveitis and Medical Retina, March 11, 2025

## 2025-03-11 NOTE — NURSING NOTE
Chief Complaints and History of Present Illnesses   Patient presents with    Uveitis Follow-Up     Chief Complaint(s) and History of Present Illness(es)       Uveitis Follow-Up              Associated symptoms: dryness, redness and itching.  Negative for eye pain, flashes and floaters              Comments    Chronic anterior uveitis of right eye  Vision improved od. Stable os.     Ocular meds:   PF QID right eye  Vigamox QID right eye  Ketorolac TID right eye   acetazolamide 500mg daily with omeprazole as needed  Valtrex 1g TID  Prednisone 30mg daily    Sonya RIVERA, March 11, 2025 10:24 AM

## 2025-03-11 NOTE — PATIENT INSTRUCTIONS
Continue these pills unchanged: Acetazolamide 500 mg once daily, Valtrex 1000 mg once daily (no need to increase),omeprazole as needed   For the prednisone tablets, take 30 mg today and for 2 more days. Then on Friday, 3/14, reduce to 20 mg each AM and do that each for 2 weeks (until Friday, 3/28). Then on 3/29, go back to just one pill 10 mg daily  For the beige top (moxifloxacin), let's continue 3x/day through Thursday, 3/13/25, then stop  For the pink top (prednisolone) and gray top (ketorolac): Please continue 4x/day through Thursday, 3/13/25. Beginning on Friday, 3/14/25, go down to 3x/day for 2 weeks. Then beginning 3/29, go back to 2x/day in each eye until next visit  Okay to use shield and sleep upright tonight and two more nights. Then starting 3/14/25, no need for shield. Please wear shades to be safe  Dr. Bustamante will set up a next visit with me at the next visit.

## 2025-03-29 ENCOUNTER — MYC REFILL (OUTPATIENT)
Dept: OPHTHALMOLOGY | Facility: CLINIC | Age: 40
End: 2025-03-29
Payer: COMMERCIAL

## 2025-03-29 DIAGNOSIS — H40.211 ACUTE ANGLE-CLOSURE GLAUCOMA OF RIGHT EYE: ICD-10-CM

## 2025-03-29 DIAGNOSIS — H20.11 CHRONIC ANTERIOR UVEITIS OF RIGHT EYE: ICD-10-CM

## 2025-03-31 RX ORDER — ACETAZOLAMIDE 500 MG/1
500 CAPSULE, EXTENDED RELEASE ORAL DAILY
Qty: 60 CAPSULE | Refills: 1 | OUTPATIENT
Start: 2025-03-31

## 2025-03-31 RX ORDER — PREDNISONE 10 MG/1
TABLET ORAL
Qty: 60 TABLET | Refills: 1 | OUTPATIENT
Start: 2025-03-31

## 2025-04-01 ENCOUNTER — TELEPHONE (OUTPATIENT)
Dept: OPHTHALMOLOGY | Facility: CLINIC | Age: 40
End: 2025-04-01

## 2025-04-01 ENCOUNTER — OFFICE VISIT (OUTPATIENT)
Dept: OPHTHALMOLOGY | Facility: CLINIC | Age: 40
End: 2025-04-01
Attending: OPHTHALMOLOGY
Payer: COMMERCIAL

## 2025-04-01 DIAGNOSIS — H20.11 CHRONIC IRIDOCYCLITIS OF RIGHT EYE: Primary | ICD-10-CM

## 2025-04-01 DIAGNOSIS — H21.41: ICD-10-CM

## 2025-04-01 DIAGNOSIS — H40.51X4: ICD-10-CM

## 2025-04-01 PROCEDURE — G0463 HOSPITAL OUTPT CLINIC VISIT: HCPCS | Performed by: OPHTHALMOLOGY

## 2025-04-01 PROCEDURE — 92133 CPTRZD OPH DX IMG PST SGM ON: CPT | Performed by: OPHTHALMOLOGY

## 2025-04-01 ASSESSMENT — VISUAL ACUITY
OS_SC: 20/25
METHOD: SNELLEN - LINEAR
OS_SC+: -3
OD_SC: 20/100
OD_PH_SC+: -1
OD_SC+: -1
OD_PH_SC: 20/60

## 2025-04-01 ASSESSMENT — SLIT LAMP EXAM - LIDS: COMMENTS: NORMAL

## 2025-04-01 ASSESSMENT — EXTERNAL EXAM - RIGHT EYE: OD_EXAM: NORMAL

## 2025-04-01 ASSESSMENT — REFRACTION_MANIFEST
OS_CYLINDER: +0.50
OS_SPHERE: -0.25
OS_AXIS: 070
OD_SPHERE: -1.25
OD_CYLINDER: SPHERE

## 2025-04-01 ASSESSMENT — TONOMETRY
OD_IOP_MMHG: 22
OS_IOP_MMHG: 25
IOP_METHOD: TONOPEN
OD_IOP_MMHG: 31
OS_IOP_MMHG: 15
IOP_METHOD: APPLANATION

## 2025-04-01 ASSESSMENT — CUP TO DISC RATIO: OD_RATIO: 0.5

## 2025-04-01 ASSESSMENT — EXTERNAL EXAM - LEFT EYE: OS_EXAM: NORMAL

## 2025-04-01 NOTE — TELEPHONE ENCOUNTER
[facility] Call Center    Phone Message    May a detailed message be left on voicemail: yes     Reason for Call: Medication Question or concern regarding medication   Prescription Clarification  Name of Medication: prednisolone 0.125% and hyaluronate in balanced salt SUSP compounded ophthalmic suspension    Prescribing Provider: Chan Bustamante MD   Pharmacy: [place], [place] - [address]     What on the order needs clarification? Pharmacy called stating that the strength on this medication needs to be changed to 0.25%. They can have a new prescription sent over or a phone call to clarify. Please advise.       Action Taken: Message routed to:  [facility] ([facility] eye    Travel Screening: Not Applicable

## 2025-04-01 NOTE — NURSING NOTE
Chief Complaints and History of Present Illnesses   Patient presents with    Post Op (Ophthalmology) Right Eye     Chief Complaint(s) and History of Present Illness(es)       Post Op (Ophthalmology) Right Eye              Laterality: right eye    Associated symptoms: dryness.  Negative for eye pain, redness, flashes, floaters, itching and burning    Pain scale: 0/10              Comments    Power is here one month post right cataract surgery with IOL implant. History of uveitis of right eye, and right glaucoma surgery. He feels like the eye meds help his vision and without them, vision reverts to how it used to be. Right eye feels irritated off and on. Sometimes right eye appears red.     aFm Ludwig COT 10:41 AM April 1, 2025

## 2025-04-01 NOTE — PROGRESS NOTES
HPI       Post Op (Ophthalmology) Right Eye    In right eye.  Associated symptoms include dryness.  Negative for eye pain, redness, flashes, floaters, itching and burning.  Pain was noted as 0/10.             Comments    Power is here one month post right cataract surgery with IOL implant. History of uveitis of right eye, and right glaucoma surgery. He feels like the eye meds help his vision and without them, vision reverts to how it used to be. Right eye feels irritated off and on. Sometimes right eye appears red.     Fam Ludwig COT 10:41 AM April 1, 2025             Last edited by Fam Ludwig on 4/1/2025 10:41 AM.          Review of systems for the eyes was negative other than the pertinent positives/negatives listed in the HPI.      Assessment & Plan      Power Blanco is a 39 year old male with the following diagnoses:   1. Chronic iridocyclitis of right eye    2. Glaucoma associated with pupillary block, indeterminate stage, right         S/P Complex cataract extraction with intraocular lens implant, anterior chamber tap, synechialysis, excision of cyclytic membrane,  Goniosynechialysis with viscodilation of Schlemm's Canal, right eye 3/3/25    Overall doing well  Worsening ACO/posterior capsular opacity (PCO) on exam today   Noted increased blur and redness after running out of prednisolone for 3 days.  Improved when restarted.  Notes some film/\blur with drop instillation and wonders with LILLY may be culprit    Intraocular pressure remains improved, but borderline today  Mild anterior chamber cell/flare today  Will benefit from YAG, but recommend waiting for inflammation control at this time  ON appearance reassuring.  OCT Nerve fiber layer updated today.  Right eye overall stable, left eye with some global thinning compare to previous    Recommend switch to pred healon with continued taper as discussed with JY  Continue oral pred taper per JY   Resume ketorolac  Continue diamox and valtrex as  before    May need to consider trial of steroid sparing therapy soon.  Discuss with ASPEN in 2 weeks  Mild intraretinal fluid on mac OCT today. Consider Fluorescein angiography at follow up   Monitor left eye closely.  Likely to need escalation of intraocular pressure therapy in that eye soon      Patient disposition:   ASPEN in 2 weeks; Robby 6 weeks  Return in about 6 weeks (around 5/13/2025) for VT only.           Attending Physician Attestation:  Complete documentation of historical and exam elements from today's encounter can be found in the full encounter summary report (not reduplicated in this progress note).  I personally obtained the chief complaint(s) and history of present illness.  I confirmed and edited as necessary the review of systems, past medical/surgical history, family history, social history, and examination findings as documented by others; and I examined the patient myself.  I personally reviewed the relevant tests, images, and reports as documented above.  I formulated and edited as necessary the assessment and plan and discussed the findings and management plan with the patient and family. . - Chan Bustamante MD

## 2025-04-02 ENCOUNTER — TELEPHONE (OUTPATIENT)
Dept: OPHTHALMOLOGY | Facility: CLINIC | Age: 40
End: 2025-04-02
Payer: COMMERCIAL

## 2025-04-02 NOTE — TELEPHONE ENCOUNTER
Called and left message to schedule an appointment with Dr Villagomez in 2 weeks.    Left the call centers phone number or asked Aromas to [facility] with a day and time.

## 2025-04-08 ENCOUNTER — TELEPHONE (OUTPATIENT)
Dept: OPHTHALMOLOGY | Facility: CLINIC | Age: 40
End: 2025-04-08
Payer: COMMERCIAL

## 2025-04-08 NOTE — TELEPHONE ENCOUNTER
Called pt to ask Mead to ask if he would call in and make an appointment with Dr Villagomez this week or next week.  I left the call center number.

## 2025-05-20 ENCOUNTER — OFFICE VISIT (OUTPATIENT)
Dept: OPHTHALMOLOGY | Facility: CLINIC | Age: 40
End: 2025-05-20
Attending: OPHTHALMOLOGY
Payer: COMMERCIAL

## 2025-05-20 ENCOUNTER — NURSE TRIAGE (OUTPATIENT)
Dept: OPHTHALMOLOGY | Facility: CLINIC | Age: 40
End: 2025-05-20
Payer: COMMERCIAL

## 2025-05-20 DIAGNOSIS — H20.11 CHRONIC ANTERIOR UVEITIS OF RIGHT EYE: Primary | ICD-10-CM

## 2025-05-20 DIAGNOSIS — T85.898A: ICD-10-CM

## 2025-05-20 DIAGNOSIS — H40.032: ICD-10-CM

## 2025-05-20 DIAGNOSIS — Z79.899 HIGH RISK MEDICATION USE: ICD-10-CM

## 2025-05-20 DIAGNOSIS — H43.391 VITREOUS OPACITIES OF RIGHT EYE: ICD-10-CM

## 2025-05-20 DIAGNOSIS — H40.2212 CHRONIC ANGLE-CLOSURE GLAUCOMA OF EYE, RIGHT, MODERATE STAGE: ICD-10-CM

## 2025-05-20 PROCEDURE — 92285 EXTERNAL OCULAR PHOTOGRAPHY: CPT | Performed by: OPHTHALMOLOGY

## 2025-05-20 PROCEDURE — 92250 FUNDUS PHOTOGRAPHY W/I&R: CPT | Performed by: OPHTHALMOLOGY

## 2025-05-20 PROCEDURE — 92133 CPTRZD OPH DX IMG PST SGM ON: CPT | Performed by: OPHTHALMOLOGY

## 2025-05-20 PROCEDURE — G0463 HOSPITAL OUTPT CLINIC VISIT: HCPCS | Performed by: OPHTHALMOLOGY

## 2025-05-20 ASSESSMENT — CUP TO DISC RATIO
OD_RATIO: 0.5
OS_RATIO: 0.3

## 2025-05-20 ASSESSMENT — SLIT LAMP EXAM - LIDS: COMMENTS: NORMAL

## 2025-05-20 ASSESSMENT — TONOMETRY
IOP_METHOD: TONOPEN
IOP_METHOD: APPLANATION
OD_IOP_MMHG: 42
OS_IOP_MMHG: 23
OS_IOP_MMHG: 35
IOP_METHOD: APPLANATION
OS_IOP_MMHG: 38
OD_IOP_MMHG: 40

## 2025-05-20 ASSESSMENT — VISUAL ACUITY
OS_SC: 20/25
OD_SC: 20/150
METHOD: SNELLEN - LINEAR
OS_SC+: -1
OD_PH_SC: 20/80

## 2025-05-20 ASSESSMENT — CONF VISUAL FIELD
OS_SUPERIOR_NASAL_RESTRICTION: 0
OD_NORMAL: 1
OD_SUPERIOR_TEMPORAL_RESTRICTION: 0
OD_SUPERIOR_NASAL_RESTRICTION: 0
METHOD: COUNTING FINGERS
OS_SUPERIOR_TEMPORAL_RESTRICTION: 0
OS_INFERIOR_TEMPORAL_RESTRICTION: 0
OD_INFERIOR_NASAL_RESTRICTION: 0
OS_NORMAL: 1
OS_INFERIOR_NASAL_RESTRICTION: 0
OD_INFERIOR_TEMPORAL_RESTRICTION: 0

## 2025-05-20 ASSESSMENT — EXTERNAL EXAM - RIGHT EYE: OD_EXAM: NORMAL

## 2025-05-20 ASSESSMENT — EXTERNAL EXAM - LEFT EYE: OS_EXAM: NORMAL

## 2025-05-20 NOTE — PROGRESS NOTES
Chief Complaint/Presenting Concern:  Uveitis add on visit    Interval History of Present Ocular Illness:  Power Blanco is a 40 year old patient who returns for add on visit for blurriness and right eye pain.     Power saw Dr. Bustamante on 4/1/25 and they discussed potential YAG capsulotomy and possible additional evaluations Not able to come recently due to challenges with time off. Was not able to get preservative free prednisolone and ran out of ketorolac.    Interval Updates to Medical/Family/Social History:    Work has been good and able to work out recently    Relevant Review of Systems Updates:  Occasional headaches     Current eye related medications: Diamox 500 mg, Valtrex 1000 mg daily Prednisone 20 mg daily, prednisolone drop occasionally, no ketorolac    Retina/Uveitis Imaging:   OCT Spectralis Macula May 20, 2025  right eye: Stable mild inner/middle retinal cysts, no subretinal fluid  left eye: Normal contour, no fluid    OCT Optic Nerve RNFL Spectralis May 20, 2025  right eye: Avg thickness 111 microns, stable without thickening/thinning  left eye: Avg thickness 95 microns, stable without thickening/thinning    Optos Fundus Photos OU (both eyes) May 20, 2025  right eye: Hazy view of disc, healthy appearing vessels, periphery  left eye: Normal disc, macula, mild vessel tortuosity, normal periphery    Slit Lamp Photo May 20, 2025  right eye: Iris atrophy and opacified lens implant    Assessment:     1. Chronic anterior uveitis of right eye (Primary)  2. Opacification of intraocular lens - Right Eye  With low grade inflammation but more opacified lens implant    3. Vitreous opacities of right eye  With subtle stable mild cysts on OCT    4. Chronic angle-closure glaucoma of eye, right, moderate stage  IOP 40 today    5. Angle-closure glaucoma suspect of left eye  With patent iridotomy, no iris bombe, but elevated IOP     6. High risk medication use  Prednisone 20 mg daily, Valtrex 1000 mg daily, Diamox 500  mg daily    Plan/Recommendations:    Discussed findings with patient and his Wife. There is mild inflammation and minimal stable retinal edema right eye but the lens implant in the right eye is more opacified than noted by Dr. Bustamante last visit. We took photos and discussed briefly but [place] had to leave before we could review all the imaging. We set up a follow up in 1 day to discuss in more detail  Eye pressure was 40, 35 today on Diamox 500 mg daily. After several rounds of pressure lowering drops in the clinic. After 2 rounds of pressure lowering drops, the eye pressure was largely unchanged. I consulted with Dr. Bustamante and we will likely recommend an additional opinion with one of our other colleagues who may discuss other options for longer term pressure lowering options  Recommend additional testing: None  Continue these medications all unchanged: Prednisone 20 mg daily, Valtrex 1000 mg daily, Diamox 500 mg daily  NO new treatment before end of visit today but will discuss tomorrow    RTC 1 day. Wed 5/21/25. Applanate, no dilation, no testing    Physician Attestation     Attending Physician Attestation:  Complete documentation of historical and exam elements from today's encounter can be found in the full encounter summary report (not reduplicated in this progress note). I personally obtained the chief complaint(s) and history of present illness. I confirmed and edited as necessary the review of systems, past medical/surgical history, family history, social history, and examination findings as documented by others; and I examined the patient myself. I personally reviewed the relevant tests, images, and reports as documented above. I formulated and edited as necessary the assessment and plan and discussed the findings and management plan with the patient and family members present at the time of this visit.  Devon Villagomez M.D., Uveitis and Medical Retina, May 20, 2025

## 2025-05-20 NOTE — NURSING NOTE
Chief Complaints and History of Present Illnesses   Patient presents with    Uveitis Follow-Up     Chief Complaint(s) and History of Present Illness(es)       Uveitis Follow-Up              Laterality: right eye    Onset: gradual    Onset: months ago    Quality: Past few days the va in the right eye has decreased      Severity: moderate    Frequency: constantly    Course: gradually worsening    Associated symptoms: headache (past few days over the right eye.  Feels like a pressure headache) and photophobia.  Negative for flashes and floaters    Treatments tried: eye drops    Pain scale: 0/10              Comments    Here for chronic anterior uveitis of right eye  Prednisolone today for the first time in a month  Ketorolac ran out  Diamox 500 mg  Valtrex daily  Prednisone 20 mg  Ying Chambers COT 1:28 PM May 20, 2025    Prednisone                     Negative

## 2025-05-20 NOTE — TELEPHONE ENCOUNTER
Caller reporting the following red-flag symptom(s): Pt had surgery with Dr. Bustamante in March. Pt's wife states he has had a sudden onset of blurry vision in the Rt eye for the past 3 days.    Per the system red-flag symptom policy, patient was instructed to:  speak with a Registered Nurse    Action:  Patient warm transferred to a Registered Nurse

## 2025-05-20 NOTE — TELEPHONE ENCOUNTER
Nurse Triage SBAR    Is this a 2nd Level Triage? YES, LICENSED PRACTITIONER REVIEW IS REQUIRED    Situation: Blurred vision, unchanged and persistent since surgery 3/3/25. Patient seeking appointment- last seen 4/1/25. States he couldn't get to one appt ( no show 4/18)and had to cancel another due to transport( 6/13.  He lives in [place] and states he is low on gas often, getting to [place] can be a challenge.  3/3/25 HARRY    RIGHT EYE COMPLEX PHACOEMULSIFICATION, CATARACT, WITH STANDARD INTRAOCULAR LENS IMPLANT INSERTION          Background: Last seen 4/1/25 Harry  1. Chronic iridocyclitis of right eye    2. Glaucoma associated with pupillary block, indeterminate stage, right     Last visit 4/1/25 with Dr Bustamante Recommend switch to pred healon with continued taper as discussed with JY  Continue oral pred taper per JY   Resume ketorolac  Continue diamox and valtrex as before    He is on oral prednisone 10 Mg daily, Acetalozamide 500 mg daily and  Valacycovir currently and  Lubrication gtts and prednisone drops, he has not received the Prednisone / healon gtts from [facility]- faxed 4/2/25 per chart review  prednisolone 0.25% and hyaluronate in balanced salt SUSP compounded ophthalmic [id number]/2/2025     Assessment: Postop eye, blurred vision persisting, complex eye hx, missed appts. Surgery 3/3/25, last visit 4/1/25, due now for follow up.  -He feels he is not progressing blurred vision is the same as prior to last surgery 3/3/25.  -- [facility].        Protocol Recommended Disposition:   See in clinic follow up due, barriers to care- transport, gas.       Routed to provider    Does the patient meet one of the following criteria for ADS visit consideration? No    Reason for Disposition   Patient wants to be seen    Additional Information   Negative: Weakness of the face, arm or leg on one side of the body   Negative: Followed getting  substance in the eye   Negative: Foreign body stuck in the eye   Negative: Followed an eye injury   Negative: Followed sun lamp or sun exposure (UV keratitis)   Negative: Yellow or green discharge (pus) in the eye   Negative: Pregnant   Negative: Complete loss of vision in one or both eyes   Negative: Severe eye pain   Negative: Severe headache   Negative: Double vision   Negative: Patient sounds very sick or weak to the triager    Protocols used: Vision Loss or Change-A-OH

## 2025-05-20 NOTE — Clinical Note
Reed Lima: Hope the slit lamp photo right eye captures the opacification. I'll review with him about additional glaucoma consult. Thank you!

## 2025-05-20 NOTE — TELEPHONE ENCOUNTER
Home/mobile [phone]    Spoke to pt at 0830    New redness last few days (Sunday)    New blurred vision also noted Sunday.    Headache pain around occasionally    Pain/eye pressure pain also that comes and goes    Some light sensitivities.    Pt states likely able to come in today.    -- reviewed with Dr. Villagomez and Dr. Villagomez able to see today.    Scheduled appt at 1 PM and confirmed appt with pt.    Arik Rangel RN 8:46 AM 05/20/25

## 2025-05-21 ENCOUNTER — OFFICE VISIT (OUTPATIENT)
Dept: OPHTHALMOLOGY | Facility: CLINIC | Age: 40
End: 2025-05-21
Attending: OPHTHALMOLOGY
Payer: COMMERCIAL

## 2025-05-21 DIAGNOSIS — H20.11 CHRONIC ANTERIOR UVEITIS OF RIGHT EYE: Primary | ICD-10-CM

## 2025-05-21 DIAGNOSIS — H40.2212 CHRONIC ANGLE-CLOSURE GLAUCOMA OF EYE, RIGHT, MODERATE STAGE: ICD-10-CM

## 2025-05-21 DIAGNOSIS — H40.032: ICD-10-CM

## 2025-05-21 DIAGNOSIS — Z79.899 HIGH RISK MEDICATION USE: ICD-10-CM

## 2025-05-21 DIAGNOSIS — T85.898A: ICD-10-CM

## 2025-05-21 PROCEDURE — G0463 HOSPITAL OUTPT CLINIC VISIT: HCPCS | Performed by: OPHTHALMOLOGY

## 2025-05-21 RX ORDER — PREDNISOLONE ACETATE 10 MG/ML
1 SUSPENSION/ DROPS OPHTHALMIC 2 TIMES DAILY
Qty: 10 ML | Refills: 3 | Status: SHIPPED | OUTPATIENT
Start: 2025-05-21

## 2025-05-21 RX ORDER — DORZOLAMIDE HYDROCHLORIDE AND TIMOLOL MALEATE 20; 5 MG/ML; MG/ML
1 SOLUTION/ DROPS OPHTHALMIC 2 TIMES DAILY
Qty: 10 ML | Refills: 5 | Status: SHIPPED | OUTPATIENT
Start: 2025-05-21

## 2025-05-21 RX ORDER — PREDNISONE 10 MG/1
10 TABLET ORAL DAILY
Qty: 60 TABLET | Refills: 1 | Status: SHIPPED | OUTPATIENT
Start: 2025-05-21

## 2025-05-21 RX ORDER — VALACYCLOVIR HYDROCHLORIDE 1 G/1
1000 TABLET, FILM COATED ORAL DAILY
Qty: 60 TABLET | Refills: 1 | Status: SHIPPED | OUTPATIENT
Start: 2025-05-21

## 2025-05-21 RX ORDER — ACETAZOLAMIDE 500 MG/1
500 CAPSULE, EXTENDED RELEASE ORAL DAILY
Qty: 60 CAPSULE | Refills: 1 | Status: SHIPPED | OUTPATIENT
Start: 2025-05-21

## 2025-05-21 RX ORDER — BRIMONIDINE TARTRATE 2 MG/ML
1 SOLUTION/ DROPS OPHTHALMIC 2 TIMES DAILY
Qty: 15 ML | Refills: 5 | Status: SHIPPED | OUTPATIENT
Start: 2025-05-21

## 2025-05-21 RX ORDER — OMEPRAZOLE 40 MG/1
40 CAPSULE, DELAYED RELEASE ORAL DAILY
Qty: 60 CAPSULE | Refills: 1 | Status: SHIPPED | OUTPATIENT
Start: 2025-05-21

## 2025-05-21 ASSESSMENT — VISUAL ACUITY
OD_PH_SC: 20/40
METHOD: SNELLEN - LINEAR
OS_SC: 20/25
OD_SC: 20/50
OD_SC+: -2+1

## 2025-05-21 ASSESSMENT — SLIT LAMP EXAM - LIDS: COMMENTS: NORMAL

## 2025-05-21 ASSESSMENT — CONF VISUAL FIELD
OD_INFERIOR_NASAL_RESTRICTION: 0
OD_NORMAL: 1
OS_INFERIOR_TEMPORAL_RESTRICTION: 0
OS_INFERIOR_NASAL_RESTRICTION: 0
OS_NORMAL: 1
OD_SUPERIOR_NASAL_RESTRICTION: 0
OD_SUPERIOR_TEMPORAL_RESTRICTION: 0
OS_SUPERIOR_TEMPORAL_RESTRICTION: 0
OD_INFERIOR_TEMPORAL_RESTRICTION: 0
METHOD: COUNTING FINGERS
OS_SUPERIOR_NASAL_RESTRICTION: 0

## 2025-05-21 ASSESSMENT — TONOMETRY
IOP_METHOD: APPLANATION
OS_IOP_MMHG: 16
OD_IOP_MMHG: 28

## 2025-05-21 ASSESSMENT — CUP TO DISC RATIO
OD_RATIO: 0.5
OS_RATIO: 0.3

## 2025-05-21 ASSESSMENT — EXTERNAL EXAM - RIGHT EYE: OD_EXAM: NORMAL

## 2025-05-21 ASSESSMENT — EXTERNAL EXAM - LEFT EYE: OS_EXAM: NORMAL

## 2025-05-21 NOTE — PATIENT INSTRUCTIONS
Continue these medications unchanged:Omeprazole 40 mg daily, Diamox 500 mg daily,Valtrex 1000 mg daily  For the Prednisone pills, let's reduce to just one pill (10 mg daily)  Let's start a low frequency steroid drop (prednisolone) 2x/day right eye   We should start some drops for pressure lowering in the right eye: Dorzolamide-Timolol 2x/day right eye and Brimonidine 2x/day right eye   Given no inflammation and good pressure left eye, no drops needed left eye

## 2025-05-21 NOTE — LETTER
May 21, 2025      Re: Power Blanco   1985    To Whom It May Concern:    This is to confirm that the above patient was seen on 5/20/25 for 4-5 hours and 1 hour on 5/21/2025.  Power Blanco is able to return to work today without restrictions.Thank you for allowing Power Blanco to take time from school/work for this necessary medical appointment. Please contact us for any questions.    Mr. Blanco has an eye issue for which he would benefit from frequent breaks throughout the day to rest the eyes. Please let us know if you need anything else to support this request.    Thank you for your cooperation in this matter.  Please do not hesitate to contact me if you have any further questions.    Sincerely,      NATO URBANO

## 2025-05-21 NOTE — NURSING NOTE
Chief Complaints and History of Present Illnesses   Patient presents with    Uveitis Follow-Up     Chief Complaint(s) and History of Present Illness(es)       Uveitis Follow-Up              Onset: days ago    Quality: Va is better today    Severity: moderate    Frequency: intermittently    Course: gradually improving    Associated symptoms: Negative for eye pain, headache and photophobia    Treatments tried: no treatments    Pain scale: 0/10              Comments    Here for chronic anterior uveitis of right eye  Blur is less than yesterday  Prednisolone today for the first time in a month   Ketorolac ran out   Diamox 500 mg not taken today  Valtrex daily   Prednisone 20 mg   Ying Chambers COT 7:13 AM May 21, 2025

## 2025-05-21 NOTE — LETTER
5/21/2025       RE: Power Blanco  [address]  [facility] [address]     Dear Colleague,    Thank you for referring your patient, Power Blanco, to the [facility] [place] at [facility]. Please see a copy of my visit note below.    Chief Complaint/Presenting Concern:  Uveitis follow up    Interval History of Present Ocular Illness:  Power Blanco is a 40 year old patient who returns for follow up of his uveitis. He was here yesterday for a brief visit and imaging but was unable to stay to review imaging. We also gave drops in the office to lower pressure     Power notes the vision is perhaps a bit better today    Interval Updates to Medical/Family/Social History:    More work stressful and working with some new staff.    Relevant Review of Systems Updates:  No changes     Current eye related medications: Diamox 500 mg daily (did not take today), Valtrex 1000 mg daily Prednisone 20 mg daily     No Imaging today--done yesterday    Assessment:     1. Chronic anterior uveitis of right eye (Primary)  Mild low grade inflammation     2. Opacification of intraocular lens - Right Eye  Persistent     3. Chronic angle-closure glaucoma of eye, right, moderate stage  IOP 28    4. Angle-closure glaucoma suspect of left eye  IOP 16.    5. High risk medication use  Diamox 500 mg daily,Valtrex 1000 mg daily,Prednisone 20 mg daily     Plan/Recommendations:    Discussed findings with patient and his Wife. We reviewed images and another exam today. There is some improved acuity likely from less cloudiness of cornea but we reviewed image of lens implant from yesterday. We discussed modification of inflammation treatment but also evaluation for eye pressure. We may consider YAG laser to lens implant later but should see how things go with modifying prednisone and pressure treatment  Eye pressure is 28,16 even without Diamox this morning. We will add drops  right eye to see if this can help keep pressure low in the right eye.   Recommend additional testing: None at this time.   Continue these medications unchanged:Omeprazole 40 mg daily, Diamox 500 mg daily,Valtrex 1000 mg daily  For the Prednisone pills, let's reduce to just one pill (10 mg daily)  Let's start a low frequency steroid drop (prednisolone) 2x/day right eye   We should start some drops for pressure lowering in the right eye: Dorzolamide-Timolol 2x/day right eye and Brimonidine 2x/day right eye   Given no inflammation and good pressure left eye, no drops needed left eye     RTC 2-3 weeks Dr. Rolon for New Glaucoma ?Tube eval right eye     Early-mid July on a Wed for Robby and ASPEN same day, no dilation no testing for JY    Attending Physician Attestation:  Complete documentation of historical and exam elements from today's encounter can be found in the full encounter summary report (not reduplicated in this progress note). I personally obtained the chief complaint(s) and history of present illness. I confirmed and edited as necessary the review of systems, past medical/surgical history, family history, social history, and examination findings as documented by others; and I examined the patient myself. I personally reviewed the relevant tests, images, and reports as documented above. I formulated and edited as necessary the assessment and plan and discussed the findings and management plan with the patient and family members present at the time of this visit.  Devon Villagomez M.D., Uveitis and Medical Retina, May 21, 2025     Again, thank you for allowing me to participate in the care of your patient.      Sincerely,    Devon Villagomez MD  Uveitis and Medical Retina    Department of Ophthalmology & Visual Neurosciences  [facility]

## 2025-05-21 NOTE — PROGRESS NOTES
Chief Complaint/Presenting Concern:  Uveitis follow up    Interval History of Present Ocular Illness:  Power Blanco is a 40 year old patient who returns for follow up of his uveitis. He was here yesterday for a brief visit and imaging but was unable to stay to review imaging. We also gave drops in the office to lower pressure     Power notes the vision is perhaps a bit better today    Interval Updates to Medical/Family/Social History:    More work stressful and working with some new staff.    Relevant Review of Systems Updates:  No changes     Current eye related medications: Diamox 500 mg daily (did not take today), Valtrex 1000 mg daily Prednisone 20 mg daily     No Imaging today--done yesterday    Assessment:     1. Chronic anterior uveitis of right eye (Primary)  Mild low grade inflammation     2. Opacification of intraocular lens - Right Eye  Persistent     3. Chronic angle-closure glaucoma of eye, right, moderate stage  IOP 28    4. Angle-closure glaucoma suspect of left eye  IOP 16.    5. High risk medication use  Diamox 500 mg daily,Valtrex 1000 mg daily,Prednisone 20 mg daily     Plan/Recommendations:    Discussed findings with patient and his Wife. We reviewed images and another exam today. There is some improved acuity likely from less cloudiness of cornea but we reviewed image of lens implant from yesterday. We discussed modification of inflammation treatment but also evaluation for eye pressure. We may consider YAG laser to lens implant later but should see how things go with modifying prednisone and pressure treatment  Eye pressure is 28,16 even without Diamox this morning. We will add drops right eye to see if this can help keep pressure low in the right eye.   Recommend additional testing: None at this time.   Continue these medications unchanged:Omeprazole 40 mg daily, Diamox 500 mg daily,Valtrex 1000 mg daily  For the Prednisone pills, let's reduce to just one pill (10 mg daily)  Let's start a  low frequency steroid drop (prednisolone) 2x/day right eye   We should start some drops for pressure lowering in the right eye: Dorzolamide-Timolol 2x/day right eye and Brimonidine 2x/day right eye   Given no inflammation and good pressure left eye, no drops needed left eye     RTC 2-3 weeks Dr. Rolon for New Glaucoma ?Tube eval right eye     Early-mid July on a Wed for Robby and ASPEN same day, no dilation no testing for JY    Physician Attestation     Attending Physician Attestation:  Complete documentation of historical and exam elements from today's encounter can be found in the full encounter summary report (not reduplicated in this progress note). I personally obtained the chief complaint(s) and history of present illness. I confirmed and edited as necessary the review of systems, past medical/surgical history, family history, social history, and examination findings as documented by others; and I examined the patient myself. I personally reviewed the relevant tests, images, and reports as documented above. I formulated and edited as necessary the assessment and plan and discussed the findings and management plan with the patient and family members present at the time of this visit.  Devon Villagomez M.D., Uveitis and Medical Retina, May 21, 2025

## 2025-06-03 DIAGNOSIS — H40.032: ICD-10-CM

## 2025-06-03 DIAGNOSIS — H40.2212 CHRONIC ANGLE-CLOSURE GLAUCOMA OF EYE, RIGHT, MODERATE STAGE: Primary | ICD-10-CM

## 2025-06-08 ENCOUNTER — HEALTH MAINTENANCE LETTER (OUTPATIENT)
Age: 40
End: 2025-06-08

## 2025-07-09 ENCOUNTER — OFFICE VISIT (OUTPATIENT)
Dept: OPHTHALMOLOGY | Facility: CLINIC | Age: 40
End: 2025-07-09
Attending: OPHTHALMOLOGY
Payer: COMMERCIAL

## 2025-07-09 ENCOUNTER — LAB (OUTPATIENT)
Dept: LAB | Facility: CLINIC | Age: 40
End: 2025-07-09
Payer: COMMERCIAL

## 2025-07-09 DIAGNOSIS — H40.51X4: ICD-10-CM

## 2025-07-09 DIAGNOSIS — H20.11 CHRONIC ANTERIOR UVEITIS OF RIGHT EYE: Primary | ICD-10-CM

## 2025-07-09 DIAGNOSIS — Z79.899 HIGH RISK MEDICATION USE: ICD-10-CM

## 2025-07-09 DIAGNOSIS — H21.41: ICD-10-CM

## 2025-07-09 DIAGNOSIS — H20.11 CHRONIC ANTERIOR UVEITIS OF RIGHT EYE: ICD-10-CM

## 2025-07-09 DIAGNOSIS — H40.2212 CHRONIC ANGLE-CLOSURE GLAUCOMA OF EYE, RIGHT, MODERATE STAGE: ICD-10-CM

## 2025-07-09 DIAGNOSIS — H40.032: ICD-10-CM

## 2025-07-09 DIAGNOSIS — T85.898A: ICD-10-CM

## 2025-07-09 DIAGNOSIS — H40.032: Primary | ICD-10-CM

## 2025-07-09 LAB
ALBUMIN SERPL BCG-MCNC: 4.7 G/DL (ref 3.5–5.2)
ALP SERPL-CCNC: 60 U/L (ref 40–150)
ALT SERPL W P-5'-P-CCNC: 16 U/L (ref 0–70)
ANION GAP SERPL CALCULATED.3IONS-SCNC: 11 MMOL/L (ref 7–15)
AST SERPL W P-5'-P-CCNC: 20 U/L (ref 0–45)
BASOPHILS # BLD AUTO: 0 10E3/UL (ref 0–0.2)
BASOPHILS NFR BLD AUTO: 1 %
BILIRUB SERPL-MCNC: 1.4 MG/DL
BUN SERPL-MCNC: 16.2 MG/DL (ref 6–20)
CALCIUM SERPL-MCNC: 9.5 MG/DL (ref 8.8–10.4)
CHLORIDE SERPL-SCNC: 108 MMOL/L (ref 98–107)
CREAT SERPL-MCNC: 0.78 MG/DL (ref 0.67–1.17)
EGFRCR SERPLBLD CKD-EPI 2021: >90 ML/MIN/1.73M2
EOSINOPHIL # BLD AUTO: 0.1 10E3/UL (ref 0–0.7)
EOSINOPHIL NFR BLD AUTO: 1 %
ERYTHROCYTE [DISTWIDTH] IN BLOOD BY AUTOMATED COUNT: 11.9 % (ref 10–15)
GLUCOSE SERPL-MCNC: 157 MG/DL (ref 70–99)
HBV SURFACE AG SERPL QL IA: NONREACTIVE
HCO3 SERPL-SCNC: 23 MMOL/L (ref 22–29)
HCT VFR BLD AUTO: 47.3 % (ref 40–53)
HCV AB SERPL QL IA: NONREACTIVE
HGB BLD-MCNC: 15.7 G/DL (ref 13.3–17.7)
HIV 1+2 AB+HIV1 P24 AG SERPL QL IA: NONREACTIVE
IMM GRANULOCYTES # BLD: 0 10E3/UL
IMM GRANULOCYTES NFR BLD: 0 %
LYMPHOCYTES # BLD AUTO: 0.7 10E3/UL (ref 0.8–5.3)
LYMPHOCYTES NFR BLD AUTO: 10 %
MCH RBC QN AUTO: 31 PG (ref 26.5–33)
MCHC RBC AUTO-ENTMCNC: 33.2 G/DL (ref 31.5–36.5)
MCV RBC AUTO: 93 FL (ref 78–100)
MONOCYTES # BLD AUTO: 0.5 10E3/UL (ref 0–1.3)
MONOCYTES NFR BLD AUTO: 6 %
NEUTROPHILS # BLD AUTO: 6.4 10E3/UL (ref 1.6–8.3)
NEUTROPHILS NFR BLD AUTO: 83 %
NRBC # BLD AUTO: 0 10E3/UL
NRBC BLD AUTO-RTO: 0 /100
PLATELET # BLD AUTO: 165 10E3/UL (ref 150–450)
POTASSIUM SERPL-SCNC: 3.9 MMOL/L (ref 3.4–5.3)
PROT SERPL-MCNC: 7.7 G/DL (ref 6.4–8.3)
RBC # BLD AUTO: 5.07 10E6/UL (ref 4.4–5.9)
SODIUM SERPL-SCNC: 142 MMOL/L (ref 135–145)
T PALLIDUM AB SER QL: NONREACTIVE
WBC # BLD AUTO: 7.7 10E3/UL (ref 4–11)

## 2025-07-09 PROCEDURE — 80053 COMPREHEN METABOLIC PANEL: CPT | Performed by: PATHOLOGY

## 2025-07-09 PROCEDURE — 99000 SPECIMEN HANDLING OFFICE-LAB: CPT | Performed by: PATHOLOGY

## 2025-07-09 PROCEDURE — 999N000103 HC STATISTIC NO CHARGE FACILITY FEE

## 2025-07-09 PROCEDURE — 87340 HEPATITIS B SURFACE AG IA: CPT | Performed by: OPHTHALMOLOGY

## 2025-07-09 PROCEDURE — 86780 TREPONEMA PALLIDUM: CPT | Performed by: OPHTHALMOLOGY

## 2025-07-09 PROCEDURE — 85025 COMPLETE CBC W/AUTO DIFF WBC: CPT | Performed by: PATHOLOGY

## 2025-07-09 PROCEDURE — 86803 HEPATITIS C AB TEST: CPT | Performed by: OPHTHALMOLOGY

## 2025-07-09 PROCEDURE — 36415 COLL VENOUS BLD VENIPUNCTURE: CPT | Performed by: PATHOLOGY

## 2025-07-09 PROCEDURE — 87389 HIV-1 AG W/HIV-1&-2 AB AG IA: CPT | Performed by: OPHTHALMOLOGY

## 2025-07-09 PROCEDURE — 84433 ASY THIOPURIN S-MTHYLTRNSFRS: CPT | Mod: 90 | Performed by: PATHOLOGY

## 2025-07-09 PROCEDURE — 86481 TB AG RESPONSE T-CELL SUSP: CPT | Performed by: OPHTHALMOLOGY

## 2025-07-09 PROCEDURE — G0463 HOSPITAL OUTPT CLINIC VISIT: HCPCS | Performed by: OPHTHALMOLOGY

## 2025-07-09 RX ORDER — DORZOLAMIDE HYDROCHLORIDE AND TIMOLOL MALEATE 20; 5 MG/ML; MG/ML
1 SOLUTION/ DROPS OPHTHALMIC 2 TIMES DAILY
Qty: 10 ML | Refills: 5 | Status: SHIPPED | OUTPATIENT
Start: 2025-07-09

## 2025-07-09 RX ORDER — BRIMONIDINE TARTRATE 2 MG/ML
1 SOLUTION/ DROPS OPHTHALMIC 2 TIMES DAILY
Qty: 15 ML | Refills: 5 | Status: SHIPPED | OUTPATIENT
Start: 2025-07-09

## 2025-07-09 RX ORDER — PREDNISONE 10 MG/1
10 TABLET ORAL DAILY
Qty: 60 TABLET | Refills: 1 | Status: SHIPPED | OUTPATIENT
Start: 2025-07-09

## 2025-07-09 RX ORDER — PREDNISOLONE ACETATE 10 MG/ML
1 SUSPENSION/ DROPS OPHTHALMIC 2 TIMES DAILY
Qty: 10 ML | Refills: 3 | Status: SHIPPED | OUTPATIENT
Start: 2025-07-09

## 2025-07-09 ASSESSMENT — EXTERNAL EXAM - LEFT EYE
OS_EXAM: NORMAL
OS_EXAM: NORMAL

## 2025-07-09 ASSESSMENT — CUP TO DISC RATIO
OD_RATIO: 0.5
OS_RATIO: 0.3
OS_RATIO: 0.3

## 2025-07-09 ASSESSMENT — CONF VISUAL FIELD
OS_NORMAL: 1
METHOD: COUNTING FINGERS
OD_SUPERIOR_NASAL_RESTRICTION: 0
OS_SUPERIOR_TEMPORAL_RESTRICTION: 0
OS_INFERIOR_TEMPORAL_RESTRICTION: 0
METHOD: COUNTING FINGERS
OD_NORMAL: 1
OS_SUPERIOR_NASAL_RESTRICTION: 0
OD_SUPERIOR_TEMPORAL_RESTRICTION: 0
OD_INFERIOR_TEMPORAL_RESTRICTION: 0
OD_INFERIOR_TEMPORAL_RESTRICTION: 0
OD_SUPERIOR_TEMPORAL_RESTRICTION: 0
OD_NORMAL: 1
OS_INFERIOR_NASAL_RESTRICTION: 0
OS_SUPERIOR_NASAL_RESTRICTION: 0
OS_INFERIOR_NASAL_RESTRICTION: 0
OS_INFERIOR_TEMPORAL_RESTRICTION: 0
OD_SUPERIOR_NASAL_RESTRICTION: 0
OD_INFERIOR_NASAL_RESTRICTION: 0
OD_INFERIOR_NASAL_RESTRICTION: 0
OS_SUPERIOR_TEMPORAL_RESTRICTION: 0
OS_NORMAL: 1

## 2025-07-09 ASSESSMENT — TONOMETRY
IOP_METHOD: TONOPEN
OD_IOP_MMHG: 28
OS_IOP_MMHG: 28
OD_IOP_MMHG: 28
OS_IOP_MMHG: 28
IOP_METHOD: TONOPEN

## 2025-07-09 ASSESSMENT — SLIT LAMP EXAM - LIDS
COMMENTS: NORMAL
COMMENTS: NORMAL

## 2025-07-09 ASSESSMENT — EXTERNAL EXAM - RIGHT EYE
OD_EXAM: NORMAL
OD_EXAM: NORMAL

## 2025-07-09 ASSESSMENT — VISUAL ACUITY
OS_SC+: -3
OD_SC: 20/200
OS_SC: 20/25
OS_SC+: -2
OD_PH_SC: 20/125
OD_PH_SC: 20/125
METHOD: SNELLEN - LINEAR
OS_SC: 20/25
OD_SC: 20/200
METHOD: SNELLEN - LINEAR

## 2025-07-09 NOTE — LETTER
7/9/2025       RE: Power Blanco  [address]  [place] [address]     Dear Colleague,    Thank you for referring your patient, Power Blanco, to the [facility] [place] at [facility]. Please see a copy of my visit note below.    Chief Complaint/Presenting Concern:  Uveitis follow up     Interval History of Present Ocular Illness:  Power Blanco is a 40 year old patient who returns for follow up of his chronic uveitis of the right eye. Last visit we added some drops to pills.     Power notes things are about the same. He has been doing some drops left eye at times.     Interval Updates to Medical/Family/Social History:  Wondering about accommodation note for work    Relevant Review of Systems Updates:  Feeling well    Laboratory Testing: No new labs     Current eye related medications:   Pills: Omeprazole 40 mg daily, Diamox 500 mg daily, Valtrex 1000 mg daily  Prednisone pills 10 mg daily  Drops: Prednisolone acetate 2x/day right eye ,Dorzolamide-Timolol 2x/day right eye , Brimonidine 2x/day right eye     Retina/Uveitis Imaging:   OCT Spectralis Macula July 9, 2025  right eye: NO view  left eye: Normal contour, no fluid    OCT Optic Nerve RNFL Spectralis July 9, 2025  right eye: No view  left eye: Avg thickness 94 microns, stable, no thinning    Assessment:     1. Chronic anterior uveitis of right eye (Primary)  Persistent low grade    2. 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 Chronic angle-closure glaucoma of eye, right, moderate stage  IOP 28     4. Angle-closure glaucoma suspect of left eye  IOP 28 with stable scan    5. High risk medication use  Omeprazole 40 mg daily, Diamox 500 mg daily, Valtrex 1000 mg daily  Prednisone 10 mg daily    Plan/Recommendations:    Discussed findings with patient, his Wife, and Dr. Bustamante. The right eye has stable low grade inflammation and  clouding over lens. Given it has not resolved, we can keep treatments the same but also look into long term steroid sparing medications to help reduce risk of further scarring in the angle and pressure issues (also to lower risk of getting inflammation left eye). We will get labs and be in touch about one of these new medications  The left eye has no inflammation and can be monitored without steroid drops  Eye pressure is 28 in each eye. We can try drops left eye and also ask for a check with our other colleague.  Recommend additional testing in preparation for possible initiation of mycophenolate or azathioprine as long term inflammation control pills: CBC, CMP, Hep B, Hep C, HIV, Treponema, Quantiferon, TMPT  Continue these pills all the same: Omeprazole 40 mg daily, Diamox 500 mg daily, Valtrex 1000 mg daily,  Prednisone pills 10 mg daily  Continue the same drops right eye: Prednisolone acetate 2x/day right eye ,Dorzolamide-Timolol 2x/day right eye , Brimonidine 2x/day right eye   Add pressure drops only for the left eye: Dorzolamide-Timolol 2x/day right eye , Brimonidine 2x/day right eye. No prednisolone drop left eye as no inflammation    RTC Lab at [facility] Today and then Dr. Thomson in next 2-3 weeks    Hernando Mid-late Aug applanate, no dilation, no testing    Physician Attestation    Attending Physician Attestation:  Complete documentation of historical and exam elements from today's encounter can be found in the full encounter summary report (not reduplicated in this progress note). I personally obtained the chief complaint(s) and history of present illness. I confirmed and edited as necessary the review of systems, past medical/surgical history, family history, social history, and examination findings as documented by others; and I examined the patient myself. I personally reviewed the relevant tests, images, and reports as documented above. I formulated and edited as necessary the assessment and plan and  discussed the findings and management plan with the patient and family members present at the time of this visit.  Devon Villagomez M.D., Uveitis and Medical Retina, July 9, 2025       Again, thank you for allowing me to participate in the care of your patient.      Sincerely,    Devon Villagomez MD  Uveitis and Medical Retina    Department of Ophthalmology & Visual Neurosciences  [facility]

## 2025-07-09 NOTE — PROGRESS NOTES
Chief Complaint/Presenting Concern:  Uveitis follow up     Interval History of Present Ocular Illness:  Power Blanco is a 40 year old patient who returns for follow up of his chronic uveitis of the right eye. Last visit we added some drops to pills.     Power notes things are about the same. He has been doing some drops left eye at times.     Interval Updates to Medical/Family/Social History:  Wondering about accommodation note for work    Relevant Review of Systems Updates:  Feeling well    Laboratory Testing: No new labs     Current eye related medications:   Pills: Omeprazole 40 mg daily, Diamox 500 mg daily, Valtrex 1000 mg daily  Prednisone pills 10 mg daily  Drops: Prednisolone acetate 2x/day right eye ,Dorzolamide-Timolol 2x/day right eye , Brimonidine 2x/day right eye     Retina/Uveitis Imaging:   OCT Spectralis Macula July 9, 2025  right eye: NO view  left eye: Normal contour, no fluid    OCT Optic Nerve RNFL Spectralis July 9, 2025  right eye: No view  left eye: Avg thickness 94 microns, stable, no thinning    Assessment:     1. Chronic anterior uveitis of right eye (Primary)  Persistent low grade    2. 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 Chronic angle-closure glaucoma of eye, right, moderate stage  IOP 28     4. Angle-closure glaucoma suspect of left eye  IOP 28 with stable scan    5. High risk medication use  Omeprazole 40 mg daily, Diamox 500 mg daily, Valtrex 1000 mg daily  Prednisone 10 mg daily    Plan/Recommendations:    Discussed findings with patient, his Wife, and Dr. Bustamante. The right eye has stable low grade inflammation and clouding over lens. Given it has not resolved, we can keep treatments the same but also look into long term steroid sparing medications to help reduce risk of further scarring in the angle and pressure issues (also to lower risk of getting inflammation left eye). We will get labs and be in touch about one of these  new medications  The left eye has no inflammation and can be monitored without steroid drops  Eye pressure is 28 in each eye. We can try drops left eye and also ask for a check with our other colleague.  Recommend additional testing in preparation for possible initiation of mycophenolate or azathioprine as long term inflammation control pills: CBC, CMP, Hep B, Hep C, HIV, Treponema, Quantiferon, TMPT  Continue these pills all the same: Omeprazole 40 mg daily, Diamox 500 mg daily, Valtrex 1000 mg daily,  Prednisone pills 10 mg daily  Continue the same drops right eye: Prednisolone acetate 2x/day right eye ,Dorzolamide-Timolol 2x/day right eye , Brimonidine 2x/day right eye   Add pressure drops only for the left eye: Dorzolamide-Timolol 2x/day right eye , Brimonidine 2x/day right eye. No prednisolone drop left eye as no inflammation    RTC Lab at [facility] Today and then Dr. Thomson in next 2-3 weeks    Hernando Mid-late Aug applanate, no dilation, no testing    Physician Attestation     Attending Physician Attestation:  Complete documentation of historical and exam elements from today's encounter can be found in the full encounter summary report (not reduplicated in this progress note). I personally obtained the chief complaint(s) and history of present illness. I confirmed and edited as necessary the review of systems, past medical/surgical history, family history, social history, and examination findings as documented by others; and I examined the patient myself. I personally reviewed the relevant tests, images, and reports as documented above. I formulated and edited as necessary the assessment and plan and discussed the findings and management plan with the patient and family members present at the time of this visit.  Devon Villagomez M.D., Uveitis and Medical Retina, July 9, 2025

## 2025-07-09 NOTE — PATIENT INSTRUCTIONS
Recommend additional testing in preparation for possible initiation of mycophenolate or azathioprine as long term inflammation control pills: CBC, CMP, Hep B, Hep C, HIV, Treponema, Quantiferon, TMPT  Continue these pills all the same: Omeprazole 40 mg daily, Diamox 500 mg daily, Valtrex 1000 mg daily, continue Prednisone pills 10 mg daily  Continue the same drops right eye: Prednisolone acetate 2x/day right eye ,Dorzolamide-Timolol 2x/day right eye , Brimonidine 2x/day right eye   Add pressure drops only for the left eye: Dorzolamide-Timolol 2x/day right eye , Brimonidine 2x/day right eye.No prednisolone drop left eye as no inflammation

## 2025-07-09 NOTE — PROGRESS NOTES
HPI       Uveitis Follow-Up    In right eye.  Since onset it is stable.  Associated symptoms include eye pain (tender to touch), tearing, headache (infrequent) and photophobia.  Treatments tried include eye drops.  Pain was noted as 3/10 (Only pain when touching right eye). Additional comments: Chronic iridocyclitis of right eye and Glaucoma associated with pupillary block             Comments    He states that his vision has seemed stable in both eyes, since his last eye exam.     He is using:  Omeprazole 40 mg daily  Diamox 500 mg daily  Valtrex 1000 mg daily  Prednisone pills 10 mg daily  Prednisolone acetate 2x/day right eye   Dorzolamide-Timolol 2x/day right eye   Brimonidine 2x/day right eye     FREDI Leonard 7:40 AM  July 9, 2025                 Last edited by Sonya Gamble COT on 7/9/2025  7:41 AM.          Review of systems for the eyes was negative other than the pertinent positives/negatives listed in the HPI.      Assessment & Plan      Power Blanco is a 40 year old male with the following diagnoses:   1. Angle-closure glaucoma suspect of left eye    2. Glaucoma associated with pupillary block, indeterminate stage, right    3. Pupillary seclusion of right eye    4. Chronic anterior uveitis of right eye         Here for recheck of intraocular pressure S/P Complex cataract extraction with intraocular lens implant, anterior chamber tap, synechialysis, excision of cyclytic membrane, goniosynechialysis with viscodilation of Schlemm's Canal, right eye 3/3/25  S/P Laser peripheral iridotomy (LPI) in both eyes (aborted due to discomfort)  S/P tsCPC right eye 3/31/24 with marked PostOp inflammation and hyphema    Lost to follow up with me since after April visit  Last seen with Dr. Villagomez 5/20 and 5/21/25 with noted high intraocular pressure at 40 right eye and 35 left eye   Recommended to use diamox 500 mg orally daily, add Cosopt twice a day right eye and brimonidine twice a day right eye and return  for tube shunt consult with Dr. 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  Left eye remains without inflammation    Unlikely to benefit from YAG due to membranous density.  Would favor excision at the time of tube shunt placement  Seeing Dr. Villagomez today to discuss escalation of uveitis treatment  Missed appt with Dr. Rolon, will reschedule later this month    Discussed concerns of persistent elevation of intraocular pressure in the left eye and the risk of glaucoma  Thankfully the OCT Nerve fiber layer remains healthy and stable in the left eye   Emphasized the very guarded vision prognosis of the right eye at this time and recommend more aggressive intraocular pressure management of the left eye to avoid permanent vision loss there also  Start Cosopt and brimonidine twice a day left eye now as well  Add Rhopressa right eye daily for now    Patient disposition:   Return in about 2 weeks (around 7/23/2025) for Ольга for tube shunt consult; VT, 24-2 LEFT.           Attending Physician Attestation:  Complete documentation of historical and exam elements from today's encounter can be found in the full encounter summary report (not reduplicated in this progress note).  I personally obtained the chief complaint(s) and history of present illness.  I confirmed and edited as necessary the review of systems, past medical/surgical history, family history, social history, and examination findings as documented by others; and I examined the patient myself.  I personally reviewed the relevant tests, images, and reports as documented above.  I formulated and edited as necessary the assessment and plan and discussed the findings and management plan with the patient and family. . Domingo Giles  TYLER Bustamante MD

## 2025-07-13 LAB — TPMT BLD-CCNC: 21 U/ML

## 2025-07-24 ENCOUNTER — APPOINTMENT (OUTPATIENT)
Dept: CT IMAGING | Facility: CLINIC | Age: 40
End: 2025-07-24
Attending: EMERGENCY MEDICINE
Payer: COMMERCIAL

## 2025-07-24 ENCOUNTER — ORDERS ONLY (AUTO-RELEASED) (OUTPATIENT)
Dept: EMERGENCY MEDICINE | Facility: CLINIC | Age: 40
End: 2025-07-24

## 2025-07-24 ENCOUNTER — HOSPITAL ENCOUNTER (EMERGENCY)
Facility: CLINIC | Age: 40
Discharge: HOME | End: 2025-07-24
Attending: EMERGENCY MEDICINE
Payer: COMMERCIAL

## 2025-07-24 VITALS
RESPIRATION RATE: 29 BRPM | BODY MASS INDEX: 33.34 KG/M2 | SYSTOLIC BLOOD PRESSURE: 128 MMHG | HEIGHT: 68 IN | WEIGHT: 220 LBS | DIASTOLIC BLOOD PRESSURE: 68 MMHG | TEMPERATURE: 97.6 F | OXYGEN SATURATION: 99 % | HEART RATE: 67 BPM

## 2025-07-24 DIAGNOSIS — R55 SYNCOPE, UNSPECIFIED SYNCOPE TYPE: ICD-10-CM

## 2025-07-24 DIAGNOSIS — R55 SYNCOPE, UNSPECIFIED SYNCOPE TYPE: Primary | ICD-10-CM

## 2025-07-24 LAB
ALBUMIN SERPL BCG-MCNC: 4.6 G/DL (ref 3.5–5.2)
ALBUMIN UR-MCNC: NEGATIVE MG/DL
ALP SERPL-CCNC: 52 U/L (ref 40–150)
ALT SERPL W P-5'-P-CCNC: 13 U/L (ref 0–70)
ANION GAP SERPL CALCULATED.3IONS-SCNC: 12 MMOL/L (ref 7–15)
APPEARANCE UR: CLEAR
AST SERPL W P-5'-P-CCNC: 22 U/L (ref 0–45)
ATRIAL RATE - MUSE: 52 BPM
BASE EXCESS BLDV CALC-SCNC: -0.5 MMOL/L (ref -3–3)
BILIRUB SERPL-MCNC: 1.1 MG/DL
BILIRUB UR QL STRIP: NEGATIVE
BUN SERPL-MCNC: 19 MG/DL (ref 6–20)
CALCIUM SERPL-MCNC: 9.3 MG/DL (ref 8.8–10.4)
CHLORIDE SERPL-SCNC: 104 MMOL/L (ref 98–107)
COLOR UR AUTO: YELLOW
CREAT SERPL-MCNC: 0.86 MG/DL (ref 0.67–1.17)
DIASTOLIC BLOOD PRESSURE - MUSE: 64 MMHG
EGFRCR SERPLBLD CKD-EPI 2021: >90 ML/MIN/1.73M2
ERYTHROCYTE [DISTWIDTH] IN BLOOD BY AUTOMATED COUNT: 11.9 % (ref 10–15)
GLUCOSE SERPL-MCNC: 167 MG/DL (ref 70–99)
GLUCOSE UR STRIP-MCNC: NEGATIVE MG/DL
HCO3 BLDV-SCNC: 27 MMOL/L (ref 21–28)
HCO3 SERPL-SCNC: 24 MMOL/L (ref 22–29)
HCT VFR BLD AUTO: 44.9 % (ref 40–53)
HGB BLD-MCNC: 15.6 G/DL (ref 13.3–17.7)
HGB UR QL STRIP: NEGATIVE
HOLD SPECIMEN: NORMAL
HYALINE CASTS: 4 /LPF
INTERPRETATION ECG - MUSE: NORMAL
KETONES UR STRIP-MCNC: ABNORMAL MG/DL
LACTATE SERPL-SCNC: 0.9 MMOL/L (ref 0.7–2)
LEUKOCYTE ESTERASE UR QL STRIP: NEGATIVE
MAGNESIUM SERPL-MCNC: 2 MG/DL (ref 1.7–2.3)
MCH RBC QN AUTO: 31.8 PG (ref 26.5–33)
MCHC RBC AUTO-ENTMCNC: 34.7 G/DL (ref 31.5–36.5)
MCV RBC AUTO: 92 FL (ref 78–100)
MUCOUS THREADS #/AREA URNS LPF: PRESENT /LPF
NITRATE UR QL: NEGATIVE
NT-PROBNP SERPL-MCNC: <36 PG/ML (ref 0–93)
O2/TOTAL GAS SETTING VFR VENT: 21 %
OXYHGB MFR BLDV: 32 % (ref 70–75)
P AXIS - MUSE: 51 DEGREES
PCO2 BLDV: 51 MM HG (ref 40–50)
PH BLDV: 7.32 [PH] (ref 7.32–7.43)
PH UR STRIP: 5.5 [PH] (ref 5–7)
PLATELET # BLD AUTO: 186 10E3/UL (ref 150–450)
PO2 BLDV: 20 MM HG (ref 25–47)
POTASSIUM SERPL-SCNC: 4.5 MMOL/L (ref 3.4–5.3)
PR INTERVAL - MUSE: 214 MS
PROT SERPL-MCNC: 7.3 G/DL (ref 6.4–8.3)
QRS DURATION - MUSE: 98 MS
QT - MUSE: 434 MS
QTC - MUSE: 403 MS
R AXIS - MUSE: 58 DEGREES
RBC # BLD AUTO: 4.9 10E6/UL (ref 4.4–5.9)
RBC URINE: <1 /HPF
SAO2 % BLDV: 34.5 % (ref 70–75)
SODIUM SERPL-SCNC: 140 MMOL/L (ref 135–145)
SP GR UR STRIP: 1.03 (ref 1–1.03)
SQUAMOUS EPITHELIAL: 1 /HPF
SYSTOLIC BLOOD PRESSURE - MUSE: 132 MMHG
T AXIS - MUSE: 29 DEGREES
TROPONIN T SERPL HS-MCNC: 8 NG/L
TROPONIN T SERPL HS-MCNC: 8 NG/L
TSH SERPL DL<=0.005 MIU/L-ACNC: 1.88 UIU/ML (ref 0.3–4.2)
UROBILINOGEN UR STRIP-MCNC: 2 MG/DL
VENTRICULAR RATE- MUSE: 52 BPM
WBC # BLD AUTO: 8.9 10E3/UL (ref 4–11)
WBC URINE: 2 /HPF

## 2025-07-24 PROCEDURE — 82805 BLOOD GASES W/O2 SATURATION: CPT | Performed by: EMERGENCY MEDICINE

## 2025-07-24 PROCEDURE — 84155 ASSAY OF PROTEIN SERUM: CPT | Performed by: EMERGENCY MEDICINE

## 2025-07-24 PROCEDURE — 84484 ASSAY OF TROPONIN QUANT: CPT | Performed by: EMERGENCY MEDICINE

## 2025-07-24 PROCEDURE — 81003 URINALYSIS AUTO W/O SCOPE: CPT | Performed by: EMERGENCY MEDICINE

## 2025-07-24 PROCEDURE — 83735 ASSAY OF MAGNESIUM: CPT | Performed by: EMERGENCY MEDICINE

## 2025-07-24 PROCEDURE — 85014 HEMATOCRIT: CPT | Performed by: EMERGENCY MEDICINE

## 2025-07-24 PROCEDURE — 70450 CT HEAD/BRAIN W/O DYE: CPT

## 2025-07-24 PROCEDURE — 83605 ASSAY OF LACTIC ACID: CPT | Performed by: EMERGENCY MEDICINE

## 2025-07-24 PROCEDURE — 84443 ASSAY THYROID STIM HORMONE: CPT | Performed by: EMERGENCY MEDICINE

## 2025-07-24 PROCEDURE — 83880 ASSAY OF NATRIURETIC PEPTIDE: CPT | Performed by: EMERGENCY MEDICINE

## 2025-07-24 PROCEDURE — 93005 ELECTROCARDIOGRAM TRACING: CPT | Performed by: EMERGENCY MEDICINE

## 2025-07-24 PROCEDURE — 36415 COLL VENOUS BLD VENIPUNCTURE: CPT | Performed by: EMERGENCY MEDICINE

## 2025-07-24 ASSESSMENT — COLUMBIA-SUICIDE SEVERITY RATING SCALE - C-SSRS
2. HAVE YOU ACTUALLY HAD ANY THOUGHTS OF KILLING YOURSELF IN THE PAST MONTH?: NO
1. IN THE PAST MONTH, HAVE YOU WISHED YOU WERE DEAD OR WISHED YOU COULD GO TO SLEEP AND NOT WAKE UP?: NO
6. HAVE YOU EVER DONE ANYTHING, STARTED TO DO ANYTHING, OR PREPARED TO DO ANYTHING TO END YOUR LIFE?: NO

## 2025-07-24 ASSESSMENT — ACTIVITIES OF DAILY LIVING (ADL)
ADLS_ACUITY_SCORE: 41

## 2025-07-24 NOTE — ED TRIAGE NOTES
Patient presents to the ED complaining of a syncopal type episode at home.  Wife states they were browsing the internet when patient slumped forward into the keyboard and then she lowered him to the ground.  She states it appeared he may be having a seizure.  No loss of bowel or bladder and he did not bite his tongue.  Wife states that he was out for almost two minutes and breathing funny.  EMS evaluated patient on scene prior to arrival.  Last thing patient remembers prior to episode was hard to describe flashing lights.       Triage Assessment (Adult)       Row Name 07/24/25 0304          Triage Assessment    Airway WDL WDL        Respiratory WDL    Respiratory WDL WDL        Skin Circulation/Temperature WDL    Skin Circulation/Temperature WDL WDL        Cardiac WDL    Cardiac WDL X;rhythm     Pulse Rate & Regularity bradycardic        Peripheral/Neurovascular WDL    Peripheral Neurovascular WDL WDL        Cognitive/Neuro/Behavioral WDL    Cognitive/Neuro/Behavioral WDL WDL

## 2025-07-24 NOTE — DISCHARGE INSTRUCTIONS
Your workup for syncope is unrevealing for causes of syncope.  Please follow up by seeing Cardiology and primary care, I ordered a Zio patch which monitors your cardiac rhythm for 14 days.

## 2025-07-24 NOTE — ED PROVIDER NOTES
EMERGENCY DEPARTMENT ENCOUNTER      NAME: Power Blanco  AGE: 40 year old male  YOB: 1985  MRN: [id number]  EVALUATION DATE & TIME: 7/24/2025  2:58 AM    PCP: No Ref-Primary, Physician    ED PROVIDER: Lynn Whitney M.D.      Chief Complaint   Patient presents with    Loss of Consciousness       FINAL IMPRESSION:  1. Syncope, unspecified syncope type        ED COURSE & MEDICAL DECISION MAKING:    Syncope  Differentials considered include vasovagal syncope, orthostasis, hypoglycemia, cardiac dysrhythmia, ACS, PE, infection, metabolic/electrolyte abnormality, seizure.  I ordered CBC, CMP, BNP, urinalysis, EKG, troponin, head CT.  Suspect vasovagal etiology, no red flags such as chest pain or shortness of breath prior to event.  No seizure like activity suspected, no bowel or bladder loss or tongue biting.   EKG unremarkable, no arrhythmia or ischemic changes.  No clinical suspicion for PE, ACS based on history.  No infectious etiology suspected.  No suspicion for brain bleed, no meningeal symptoms.  CT brain neg for acute process.  CXR unremarkable.  Normal neurologic exam.  Discharge home pending 2nd troponin.      2:59 AM I met with the patient to gather history and to perform my initial exam. I discussed the plan for care while in the Emergency Department.  ED Course as of 07/24/25 0507   Thu Jul 24, 2025   0423 I reviewed blood work, troponin 8, glucose slightly elevated at 167, otherwise normal CMP, blood gas significant for CO2 of 51, normal pH and bicarb.  Magnesium normal.  CT head read by myself, negative for acute process.     Pertinent Labs & Imaging studies reviewed. (See chart for details).      Medical Decision Making  I obtained history from patient and wife, I reviewed the EMR as documented in HPI, ED course, and chart review section above and below, Care impacted by chronic conditions/past medical history as documented in HPI, ED course, and chart review section above below, I  "independently interpreted Radiological studies as documented in Radiology section below. See radiology report for final interpretation., and I discussed the care with another health care provider: Dr. Cagle pending 2nd troponin and discharge    Signed out pending 2nd troponin    MIPS (CTPE, Dental pain, Stauffer, Sinusitis, Asthma/COPD, Head Trauma): Adult Minor Head Trauma:Loss of consciousness with short term memory deficits    SEPSIS: None    At the conclusion of the encounter I discussed the results of all of the tests and the disposition. The questions were answered. The patient or family acknowledged understanding and was agreeable with the care plan.      CRITICAL CARE:  N/A    HPI    Patient information was obtained from: patient and the patient's wife.    Use of : N/A.       Power Blanco is a 40 year old male who presents after a syncopal episode.     Per patient and the patient's wife, for the past few days he has been having \"flickering\" in his right eye \"like he can see his pulse\". This morning (07/24/2025) between 2:00-2:30 AM while shopping on his computer he started to have \"flickering like everything was changing colors\" in both eyes. He then lost consciousness and his wife said he leaned forward which resulted in him hitting his forehead/chin on the desk. He almost fell out of the chair but his wife caught him and lowered him to the ground. His wife noted that he was struggling to catch his breath and it looked like he may have had a seizure but she was unsure. Upon awakening he was confused/disoriented for around a minute. Also, when he tried to stand he had some balance issues. Two years ago he had a syncopal episode and was seen for this but they were unable to determine what caused the episode. With his syncopal episode two years ago he did not have the visual \"flickering\" that he experienced this morning. It was noted that he was diagnosed with glaucoma around a year ago so he is " on multiple medications for it and he has had multiple procedures. A few years ago he had his gallbladder removed and he has had abdominal issues when eating greasy foods ever since it was removed. Yesterday evening (07/23/2025) he drank an energy drink and had [employer]. He does smoke marijuana daily and he did smoke right before the syncopal episode occurred this morning.    He has had no bowel/bladder incontinence, one sided weakness, chest pain, or shortness of breath. Well he was passed out he did not bite his tongue. He has no known family history of seizures. He did not mention taking anything before coming in this morning for his symptoms. No specific daily medications names were mentioned.    Chart review:  Per Chart Review, the patient was seen on 07/22/2023 at [facility] ED for evaluation of syncope. Chest x-ray showed no abnormalities and the rest of the work up did not reveal the cause of his syncopal episode. He was in good condition and was discharged home.       REVIEW OF SYSTEMS  All other systems negative unless noted in HPI.    PAST MEDICAL HISTORY:  Past Medical History:   Diagnosis Date    Acute angle-closure glaucoma of right eye     Chronic iridocyclitis of right eye     Diabetes mellitus, type 2 (H)     Glaucoma (increased eye pressure)     Pupillary seclusion of right eye        PAST SURGICAL HISTORY:  Past Surgical History:   Procedure Laterality Date    CATARACT IOL, RT/LT      CHOLECYSTECTOMY  12/21/2022    LASER DIODE CILIARY LESIONS Right 03/31/2024    Procedure: Cyclophotocoagulation;  Surgeon: Milton Wolf MD;  Location: [facility] OR    MICROCATHETERIZATION, VISCODILATION OF SCHLEMM'S CANAL Right 03/03/2025    Procedure: GONIOSYNECHIALYSIS AND MICROCATHETERIZATION, VISCODILATION OF SCHLEMM'S CANAL;;  Surgeon: Chan Bustamante MD;  Location: [facility] OR    PHACOEMULSIFICATION WITH STANDARD INTRAOCULAR LENS IMPLANT Right 03/03/2025    Procedure: RIGHT EYE COMPLEX PHACOEMULSIFICATION, CATARACT,  WITH STANDARD INTRAOCULAR LENS IMPLANT INSERTION;  Surgeon: Chan Bustamante MD;  Location: [facility] OR    REPAIR IRIS Right 03/03/2025    Procedure: EXCISION OF CYCLITIC MEMBRANE AND SYNECHIALYSIS;  Surgeon: Chan Bustamante MD;  Location: [facility] OR         CURRENT MEDICATIONS:    No current facility-administered medications for this encounter.     Current Outpatient Medications   Medication Sig Dispense Refill    acetaZOLAMIDE (DIAMOX SEQUEL) 500 MG 12 hr capsule Take 1 capsule (500 mg) by mouth daily. 60 capsule 1    brimonidine (ALPHAGAN) 0.2 % ophthalmic solution Place 1 drop into both eyes 2 times daily. 15 mL 5    dorzolamide-timolol (COSOPT) 2-0.5 % ophthalmic solution Place 1 drop into both eyes 2 times daily. 10 mL 5    omeprazole (PRILOSEC) 40 MG DR capsule Take 1 capsule (40 mg) by mouth daily. 60 capsule 1    prednisoLONE acetate (PRED FORTE) 1 % ophthalmic suspension Place 1 drop into the right eye 2 times daily. 10 mL 3    predniSONE (DELTASONE) 10 MG tablet Take 1 tablet (10 mg) by mouth daily. 60 tablet 1    valACYclovir (VALTREX) 1000 mg tablet Take 1 tablet (1,000 mg) by mouth daily. 60 tablet 1         ALLERGIES:  Allergies   Allergen Reactions    Droperidol Anxiety and Other (See Comments)       FAMILY HISTORY:  Family History   Problem Relation Age of Onset    Glaucoma No family hx of     Macular Degeneration No family hx of     Anesthesia Reaction No family hx of     Clotting Disorder No family hx of     Bleeding Disorder No family hx of        SOCIAL HISTORY:  Social History     Socioeconomic History    Marital status:    Tobacco Use    Smoking status: Some Days     Types: Cigarettes, Cigars     Passive exposure: Current    Smokeless tobacco: Never    Tobacco comments:     Cigars - one a day    Substance and Sexual Activity    Alcohol use: Yes     Comment: 1-2 drinks on the weekends - every other    Drug use: Yes     Types: Marijuana     Comment: Smokes daily and edibles prn     "Sexual activity: Not Currently     Social Drivers of Health      Received from [facility]    Social Connections   Interpersonal Safety: Low Risk  (3/3/2025)    Interpersonal Safety     Do you feel physically and emotionally safe where you currently live?: Yes     Within the past 12 months, have you been hit, slapped, kicked or otherwise physically hurt by someone?: No     Within the past 12 months, have you been humiliated or emotionally abused in other ways by your partner or ex-partner?: No       VITALS:  Patient Vitals for the past 24 hrs:   BP Temp Temp src Pulse Resp SpO2 Height Weight   07/24/25 0330 127/71 -- -- 57 19 98 % -- --   07/24/25 0328 125/77 -- -- 60 19 99 % -- --   07/24/25 0310 -- 97.6  F (36.4  C) Oral -- -- -- -- --   07/24/25 0309 -- -- -- -- -- -- -- 99.8 kg (220 lb)   07/24/25 0303 132/64 -- Oral 53 16 100 % 1.727 m (5' 8\") --       PHYSICAL EXAM    VITAL SIGNS: /71   Pulse 57   Temp 97.6  F (36.4  C) (Oral)   Resp 19   Ht 1.727 m (5' 8\")   Wt 99.8 kg (220 lb)   SpO2 98%   BMI 33.45 kg/m    Physical Exam  Vitals and nursing note reviewed.   Constitutional:       General: He is not in acute distress.     Appearance: He is not toxic-appearing.   Eyes:      General: No scleral icterus.        Right eye: No discharge.         Left eye: No discharge.   Cardiovascular:      Rate and Rhythm: Normal rate and regular rhythm.   Pulmonary:      Effort: Pulmonary effort is normal. No respiratory distress.      Breath sounds: Normal breath sounds.   Abdominal:      General: There is no distension.      Palpations: Abdomen is soft.      Tenderness: There is no abdominal tenderness.   Musculoskeletal:         General: No swelling or deformity.      Cervical back: Neck supple. No rigidity.   Skin:     General: Skin is warm and dry.      Capillary Refill: Capillary refill takes less than 2 seconds.      Findings: No bruising or erythema.   Neurological:      " General: No focal deficit present.      Mental Status: He is alert and oriented to person, place, and time. Mental status is at baseline.      Comments: Alert and oriented x3, finger-to-nose intact bilaterally, clear speech without dysarthria or aphasia, no facial droop, 5 out of 5 strength to upper and lower extremities bilaterally.  Sensation grossly intact to upper and lower extremities bilaterally.     Psychiatric:         Mood and Affect: Mood normal.         Behavior: Behavior normal.         LABS  Labs Ordered and Resulted from Time of ED Arrival to Time of ED Departure   COMPREHENSIVE METABOLIC PANEL (LIMITED OCCURRENCES) - Abnormal       Result Value    Sodium 140      Potassium 4.5      Carbon Dioxide (CO2) 24      Anion Gap 12      Urea Nitrogen 19.0      Creatinine 0.86      GFR Estimate >90      Calcium 9.3      Chloride 104      Glucose 167 (*)     Alkaline Phosphatase 52      AST 22      ALT 13      Protein Total 7.3      Albumin 4.6      Bilirubin Total 1.1     BLOOD GAS VENOUS - Abnormal    pH Venous 7.32      pCO2 Venous 51 (*)     pO2 Venous 20 (*)     Bicarbonate Venous 27      Base Excess/Deficit Venous -0.5      FIO2 21      Oxyhemoglobin Venous 32 (*)     O2 Sat, Venous 34.5 (*)    CBC WITH PLATELETS (LIMITED OCCURRENCES) - Normal    WBC Count 8.9      RBC Count 4.90      Hemoglobin 15.6      Hematocrit 44.9      MCV 92      MCH 31.8      MCHC 34.7      RDW 11.9      Platelet Count 186     TROPONIN T, HIGH SENSITIVITY - Normal    Troponin T, High Sensitivity 8     NT-PROBNP - Normal    NT-proBNP <36     LACTIC ACID WHOLE BLOOD WITH 1X REPEAT IN 2 HR WHEN >2 - Normal    Lactic Acid, Initial 0.9     TSH WITH FREE T4 REFLEX - Normal    TSH 1.88     MAGNESIUM (LIMITED OCCURRENCES) - Normal    Magnesium 2.0     TROPONIN T, HIGH SENSITIVITY   ROUTINE UA WITH MICROSCOPIC REFLEX TO CULTURE         RADIOLOGY  Head CT w/o contrast   Final Result   IMPRESSION:   1.  No acute intracranial process.          I have independently reviewed the above image. See radiology report for detail.      EKG:    EKG obtained at 0311 and shows sinus rhythm rate 52, Qtc 403, compared to previous EKG on 7/4/24. I have independently reviewed and interpreted today's EKG, pending Cardiologist read.       PROCEDURES:  N/A      MEDICATIONS GIVEN IN THE EMERGENCY:  Medications - No data to display    NEW PRESCRIPTIONS STARTED AT TODAY'S ER VISIT  New Prescriptions    No medications on file        I, Humble Raza, am serving as a scribe to document services personally performed by Lynn Whitney MD, based on my observations and the provider's statements to me.  I, Lynn Whitney MD, attest that Humble Raza is acting in a scribe capacity, has observed my performance of the services and has documented them in accordance with my direction.     Lynn Whitney MD  Emergency Medicine  [facility] EMERGENCY ROOM  [address] [address]  [phone]  Dept: [phone]            Lynn Whitney MD  07/24/25 9190

## 2025-07-24 NOTE — ED NOTES
EMERGENCY DEPARTMENT SIGN OUT NOTE        ED COURSE AND MEDICAL DECISION MAKING  Patient was signed out to me by Dr Lynn Whitney at 5:01 AM     At time of sign out, disposition was pending delta troponin.  Second troponin was negative, would discharge home with cardiology follow-up and Zio patch mailed out.    Delta troponin returned negative, will discharge home as was the plan at signout.    FINAL IMPRESSION    1. Syncope, unspecified syncope type        ED MEDS  Medications - No data to display    LAB  Labs Ordered and Resulted from Time of ED Arrival to Time of ED Departure   COMPREHENSIVE METABOLIC PANEL (LIMITED OCCURRENCES) - Abnormal       Result Value    Sodium 140      Potassium 4.5      Carbon Dioxide (CO2) 24      Anion Gap 12      Urea Nitrogen 19.0      Creatinine 0.86      GFR Estimate >90      Calcium 9.3      Chloride 104      Glucose 167 (*)     Alkaline Phosphatase 52      AST 22      ALT 13      Protein Total 7.3      Albumin 4.6      Bilirubin Total 1.1     ROUTINE UA WITH MICROSCOPIC REFLEX TO CULTURE - Abnormal    Color Urine Yellow      Appearance Urine Clear      Glucose Urine Negative      Bilirubin Urine Negative      Ketones Urine Trace (*)     Specific Gravity Urine 1.026      Blood Urine Negative      pH Urine 5.5      Protein Albumin Urine Negative      Urobilinogen Urine 2.0 (*)     Nitrite Urine Negative      Leukocyte Esterase Urine Negative      Mucus Urine Present (*)     RBC Urine <1      WBC Urine 2      Squamous Epithelials Urine 1      Hyaline Casts Urine 4 (*)    BLOOD GAS VENOUS - Abnormal    pH Venous 7.32      pCO2 Venous 51 (*)     pO2 Venous 20 (*)     Bicarbonate Venous 27      Base Excess/Deficit Venous -0.5      FIO2 21      Oxyhemoglobin Venous 32 (*)     O2 Sat, Venous 34.5 (*)    CBC WITH PLATELETS (LIMITED OCCURRENCES) - Normal    WBC Count 8.9      RBC Count 4.90      Hemoglobin 15.6      Hematocrit 44.9      MCV 92      MCH 31.8      MCHC 34.7      RDW 11.9       Platelet Count 186     TROPONIN T, HIGH SENSITIVITY - Normal    Troponin T, High Sensitivity 8     TROPONIN T, HIGH SENSITIVITY - Normal    Troponin T, High Sensitivity 8     NT-PROBNP - Normal    NT-proBNP <36     LACTIC ACID WHOLE BLOOD WITH 1X REPEAT IN 2 HR WHEN >2 - Normal    Lactic Acid, Initial 0.9     TSH WITH FREE T4 REFLEX - Normal    TSH 1.88     MAGNESIUM (LIMITED OCCURRENCES) - Normal    Magnesium 2.0         RADIOLOGY    Head CT w/o contrast   Final Result   IMPRESSION:   1.  No acute intracranial process.          Santosh Hardy MD  [facility] EMERGENCY ROOM  [address] [address] [phone]         Santosh Hardy MD  07/24/25 2232

## 2025-07-24 NOTE — Clinical Note
Power Blanco was seen and treated in our emergency department on 7/24/2025.  He may return to work on 07/26/2025.       If you have any questions or concerns, please don't hesitate to call.      Lynn Whitney MD

## 2025-07-30 DIAGNOSIS — H40.2212 CHRONIC ANGLE-CLOSURE GLAUCOMA OF EYE, RIGHT, MODERATE STAGE: Primary | ICD-10-CM

## (undated) DEVICE — NDL 30GA 0.5" 305106

## (undated) DEVICE — GLOVE SENSICARE 7.0 MSG1070 LATEX FREE

## (undated) DEVICE — TAPE MICROPORE 1"X1.5YD 1530S-1

## (undated) DEVICE — EYE TIP IRRIGATION & ASPIRATION POLYMER CVD 0.3MM 8065751512

## (undated) DEVICE — NDL BLUNT 18GA 1.5" FILTER 305211

## (undated) DEVICE — SOL WATER IRRIG 1000ML BOTTLE 2F7114

## (undated) DEVICE — EYE SHIELD PLASTIC

## (undated) DEVICE — LINEN TOWEL PACK X5 5464

## (undated) DEVICE — DRAPE EYE SHEET 8441

## (undated) DEVICE — APPLICATORS COTTON-TIPPED 3" PKG OF 2 C15050-003

## (undated) DEVICE — PACK CATARACT CUSTOM ASC SEY15CPUMC

## (undated) DEVICE — SOL WATER IRRIG 500ML BOTTLE 2F7113

## (undated) DEVICE — EYE PACK CUSTOM ANTERIOR 30DEG TIP CENTURION PPK6682-04

## (undated) DEVICE — GLOVE BIOGEL PI MICRO SZ 7.5 48575

## (undated) DEVICE — EYE SHIELD PLASTIC CLEAR UNIVERSAL K9-6050

## (undated) DEVICE — CATARACT BLADE PACK 2.6MM 58001899

## (undated) DEVICE — EYE PREP BETADINE 5% SOLUTION 30ML 0065-0411-30

## (undated) DEVICE — EYE PROBE CYCLO G6 LASER G-PROBE 15980

## (undated) DEVICE — PACK EYE PREP KIT CUSTOM 17B0292A

## (undated) DEVICE — SYSTEM OMNI SURGICAL LF 1-102

## (undated) RX ORDER — ACETAMINOPHEN 325 MG/1
TABLET ORAL
Status: DISPENSED
Start: 2025-03-03

## (undated) RX ORDER — OXYCODONE HYDROCHLORIDE 5 MG/1
TABLET ORAL
Status: DISPENSED
Start: 2025-03-03

## (undated) RX ORDER — OXYCODONE HYDROCHLORIDE 5 MG/1
TABLET ORAL
Status: DISPENSED
Start: 2024-03-31

## (undated) RX ORDER — HYDROMORPHONE HYDROCHLORIDE 1 MG/ML
INJECTION, SOLUTION INTRAMUSCULAR; INTRAVENOUS; SUBCUTANEOUS
Status: DISPENSED
Start: 2024-03-31

## (undated) RX ORDER — SODIUM CHLORIDE, SODIUM LACTATE, POTASSIUM CHLORIDE, CALCIUM CHLORIDE 600; 310; 30; 20 MG/100ML; MG/100ML; MG/100ML; MG/100ML
INJECTION, SOLUTION INTRAVENOUS
Status: DISPENSED
Start: 2024-03-31

## (undated) RX ORDER — FENTANYL CITRATE 50 UG/ML
INJECTION, SOLUTION INTRAMUSCULAR; INTRAVENOUS
Status: DISPENSED
Start: 2024-03-31

## (undated) RX ORDER — ACETAMINOPHEN 325 MG/1
TABLET ORAL
Status: DISPENSED
Start: 2024-03-31

## (undated) RX ORDER — ONDANSETRON 2 MG/ML
INJECTION INTRAMUSCULAR; INTRAVENOUS
Status: DISPENSED
Start: 2024-03-31

## (undated) RX ORDER — FENTANYL CITRATE 50 UG/ML
INJECTION, SOLUTION INTRAMUSCULAR; INTRAVENOUS
Status: DISPENSED
Start: 2025-03-03